# Patient Record
Sex: FEMALE | Race: WHITE | NOT HISPANIC OR LATINO | Employment: OTHER | URBAN - NONMETROPOLITAN AREA
[De-identification: names, ages, dates, MRNs, and addresses within clinical notes are randomized per-mention and may not be internally consistent; named-entity substitution may affect disease eponyms.]

---

## 2017-03-29 ENCOUNTER — APPOINTMENT (OUTPATIENT)
Dept: LAB | Facility: HOSPITAL | Age: 72
End: 2017-03-29

## 2017-03-29 ENCOUNTER — PROCEDURE VISIT (OUTPATIENT)
Dept: OBSTETRICS AND GYNECOLOGY | Facility: CLINIC | Age: 72
End: 2017-03-29

## 2017-03-29 VITALS
SYSTOLIC BLOOD PRESSURE: 132 MMHG | HEIGHT: 64 IN | BODY MASS INDEX: 35.34 KG/M2 | HEART RATE: 83 BPM | WEIGHT: 207 LBS | DIASTOLIC BLOOD PRESSURE: 62 MMHG

## 2017-03-29 DIAGNOSIS — M85.80 OSTEOPENIA: ICD-10-CM

## 2017-03-29 DIAGNOSIS — L60.0 INGROWN TOENAIL: ICD-10-CM

## 2017-03-29 DIAGNOSIS — Z01.419 ENCOUNTER FOR GYNECOLOGICAL EXAMINATION WITHOUT ABNORMAL FINDING: Primary | ICD-10-CM

## 2017-03-29 DIAGNOSIS — Z13.820 ENCOUNTER FOR SCREENING FOR OSTEOPOROSIS: ICD-10-CM

## 2017-03-29 DIAGNOSIS — Z78.0 POSTMENOPAUSAL: ICD-10-CM

## 2017-03-29 DIAGNOSIS — Z12.31 SCREENING MAMMOGRAM, ENCOUNTER FOR: ICD-10-CM

## 2017-03-29 DIAGNOSIS — R20.2 PARESTHESIA OF BOTH FEET: ICD-10-CM

## 2017-03-29 LAB
CRP SERPL-MCNC: 1.5 MG/DL (ref 0–1)
ERYTHROCYTE [SEDIMENTATION RATE] IN BLOOD: 32 MM/HR (ref 0–20)
HBA1C MFR BLD: 6.52 % (ref 4–5.6)

## 2017-03-29 PROCEDURE — 86140 C-REACTIVE PROTEIN: CPT | Performed by: NURSE PRACTITIONER

## 2017-03-29 PROCEDURE — 85651 RBC SED RATE NONAUTOMATED: CPT | Performed by: NURSE PRACTITIONER

## 2017-03-29 PROCEDURE — 83036 HEMOGLOBIN GLYCOSYLATED A1C: CPT | Performed by: NURSE PRACTITIONER

## 2017-03-29 PROCEDURE — 86038 ANTINUCLEAR ANTIBODIES: CPT | Performed by: NURSE PRACTITIONER

## 2017-03-29 PROCEDURE — 99214 OFFICE O/P EST MOD 30 MIN: CPT | Performed by: NURSE PRACTITIONER

## 2017-03-29 PROCEDURE — 36415 COLL VENOUS BLD VENIPUNCTURE: CPT | Performed by: NURSE PRACTITIONER

## 2017-03-29 NOTE — PROGRESS NOTES
Subjective   Chief Complaint   Patient presents with   • Gynecologic Exam     annual exam     Sharon Esposito is a 71 y.o. year old  presenting to be seen for her annual exam, mammogram and DEXA.      She is not sexually active.  In the past 12 months there has not been new sexual partners.  Condoms are not typically used.  She would not like to be screened for STD's at today's exam.     She exercises regularly: no.  She wears her seat belt:yes.  She has concerns about domestic violence: no.  She has noticed changes in height: no.    Last pap- 3/23/16; no history of abnormal  Last mammogram- 3/2016  Last DEXA-  (osteopenia)    She's concerned because she's been having numbness and tingling in her feet for the past few months. She reports that several family members have some type of dibilitating disease that breaks down their muscle function and she's concerned because she has never been checked for it. She is also having trouble with her left great toe. She had the nail removed several years ago b/c it grew backwards; she reports that it has started to grow back and is again growing the wrong way and causing pain.    The following portions of the patient's history were reviewed and updated as appropriate:problem list, current medications, allergies, past family history, past medical history, past social history and past surgical history.  Smoking status: Never Smoker                                                                 Smokeless status: Not on file                       Review of Systems   Constitutional: Negative for activity change, appetite change, fatigue and unexpected weight change.   Respiratory: Negative for apnea and shortness of breath.    Cardiovascular: Negative for chest pain, palpitations and leg swelling.   Gastrointestinal: Negative for abdominal distention and abdominal pain.   Endocrine: Negative.    Genitourinary: Negative for pelvic pain, vaginal bleeding, vaginal  "discharge and vaginal pain.   Musculoskeletal: Positive for joint swelling. Negative for gait problem and myalgias.   Skin: Negative for color change and rash.   Neurological: Positive for numbness. Negative for dizziness, tremors, syncope, weakness and headaches.         Objective   /62  Pulse 83  Ht 64\" (162.6 cm)  Wt 207 lb (93.9 kg)  LMP  (Approximate) Comment: 1994  Breastfeeding? No  BMI 35.53 kg/m2    General:  well developed; well nourished  no acute distress   Skin:  No suspicious lesions seen   Thyroid: normal to inspection and palpation   Breasts:  Examined in supine position  Symmetric without masses or skin dimpling  Nipples normal without inversion, lesions or discharge  There are no palpable axillary nodes  Fibrocystic changes are present both breasts without a discrete mass   Abdomen: soft, non-tender; no masses  no umbilical or inginual hernias are present  no hepato-splenomegaly  Normal findings: bowel sounds normal    Pelvis: Not performed. Pap smear not indicated.     Lab Review   Pap smear- 3/23/16    Imaging  DEXA  Mammogram report        Sharon was seen today for gynecologic exam.    Diagnoses and all orders for this visit:    Encounter for gynecological examination without abnormal finding    Screening mammogram, encounter for  -     Mammo Screening Digital Tomosynthesis Bilateral With CAD    Osteopenia    Paresthesia of both feet  -     Hemoglobin A1c  -     C-reactive Protein  -     JOSHUA  -     Sedimentation Rate  -     Ambulatory Referral to Podiatry    Ingrown toenail  -     Ambulatory Referral to Podiatry    Postmenopausal    Encounter for screening for osteoporosis  -DEXA Bone Density Axial        This note was electronically signed.    "

## 2017-03-30 LAB — ANA SER QL: NEGATIVE

## 2017-04-03 ENCOUNTER — TELEPHONE (OUTPATIENT)
Dept: OBSTETRICS AND GYNECOLOGY | Facility: CLINIC | Age: 72
End: 2017-04-03

## 2017-04-03 NOTE — TELEPHONE ENCOUNTER
----- Message from LEXI Ding sent at 4/3/2017  9:27 AM CDT -----  Please let Ms. Esposito know that her bone density in the spine is still normal and hips is still osteopenia. She needs to be taking vitamin d 2000iu daily and calcium 1200mg daily. If she's not please send a RX for her. She'll need her next DEXA in 2 years.

## 2017-04-05 ENCOUNTER — OFFICE VISIT (OUTPATIENT)
Dept: PODIATRY | Facility: CLINIC | Age: 72
End: 2017-04-05

## 2017-04-05 VITALS
DIASTOLIC BLOOD PRESSURE: 83 MMHG | WEIGHT: 205 LBS | OXYGEN SATURATION: 96 % | BODY MASS INDEX: 35 KG/M2 | HEIGHT: 64 IN | SYSTOLIC BLOOD PRESSURE: 165 MMHG | HEART RATE: 76 BPM

## 2017-04-05 DIAGNOSIS — Z76.89 REFERRAL OF PATIENT: Primary | ICD-10-CM

## 2017-04-05 DIAGNOSIS — M79.671 RIGHT FOOT PAIN: Primary | ICD-10-CM

## 2017-04-05 DIAGNOSIS — L60.0 INGROWN TOENAIL: ICD-10-CM

## 2017-04-05 PROCEDURE — 99203 OFFICE O/P NEW LOW 30 MIN: CPT | Performed by: PODIATRIST

## 2017-04-05 RX ORDER — DOCUSATE SODIUM 100 MG/1
100 CAPSULE, LIQUID FILLED ORAL 2 TIMES DAILY PRN
Qty: 60 CAPSULE | Refills: 5 | Status: SHIPPED | OUTPATIENT
Start: 2017-04-05 | End: 2018-02-21

## 2017-04-05 RX ORDER — CHOLECALCIFEROL (VITAMIN D3) 50 MCG
2000 TABLET ORAL DAILY
Qty: 30 TABLET | Refills: 5 | Status: SHIPPED | OUTPATIENT
Start: 2017-04-05 | End: 2018-04-05

## 2017-04-05 RX ORDER — IBUPROFEN 200 MG
600 CAPSULE ORAL 2 TIMES DAILY
Qty: 60 TABLET | Refills: 5 | Status: SHIPPED | OUTPATIENT
Start: 2017-04-05 | End: 2018-04-05

## 2017-04-05 NOTE — PROGRESS NOTES
Sharon Esposito  1945  71 y.o. female   PCP: Dr. Irby last seen in November.  Saw Gaviota Sheppard last week.  Patient presents today for bilateral hallux ingrown toenails.       4/5/17  Chief Complaint   Patient presents with   • Left Foot - Ingrown Toenail   • Right Foot - Ingrown Toenail           History of Present Illness    72 y/o female presents with chief complaint of painful great toenails. They have been bothering her for the last month. Has history of right hallux nail avulsion many years ago. It has since partially grown back and become bothersome.  States the the R nail is more painful than the left. Describes it as achy and constant in shoe gear. Has no other pedal complaints.          Past Medical History:   Diagnosis Date   • Acquired hypothyroidism     with hashimoto's thyroiditis      • Acute bronchitis    • Acute sinusitis    • Allergic rhinitis    • Bilateral hand pain    • Cough    • Diverticulitis of colon    • Edema     idiopathic state, history of      • Encounter for gynecological examination (general) (routine) without abnormal findings    • Encounter for screening for osteoporosis    • Essential hypertension    • Hemorrhoids     internal & external      • Hx of bone density study     DEXA BONE DENSITY APPENDICULAR: osteopenia, 12/18/2013   • Hx of screening mammography     MAMMOGRAM SCREENING: x2, 02/26/2014   • Hyperlipidemia     with elevated low density lipoprotein   • 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     history   • Infection of skin and subcutaneous tissue     Infection of skin AND/OR subcutaneous tissue      • Onychogryposis     recurrent ingrown toenail      • Open wound of face    • Osteopenia    • Screening for malignant neoplasm of breast    • Screening for osteoporosis    • Seasonal allergies     Seasonal allergy      • Vitamin deficiency          Past Surgical History:   Procedure Laterality Date   • ENDOSCOPY AND COLONOSCOPY   04/20/2009    A few small diverticula in the sigmoid & the descending colon. Small internal & external hemorrhoids were present. Colonoscopy otherwise normal   • INJECTION OF MEDICATION  06/23/2015    Celestone (betamethasone) x2   • INJECTION OF MEDICATION  12/08/2014    Toradol    • PAP SMEAR  06/23/2009    Negative for intraepithelial lesion or malignancy   • SKIN BIOPSY  09/30/2010    L neck lesion- seborrheic keratosis. right infraorbital-seborrheic keratosis, right lateral orbital- intradermal nevus. forehead lesion-early squamous papilloma         Family History   Problem Relation Age of Onset   • Breast cancer Mother    • Coronary artery disease Father    • Breast cancer Other    • Coronary artery disease Other    • Heart disease Other          Social History     Social History   • Marital status: Single     Spouse name: N/A   • Number of children: N/A   • Years of education: N/A     Occupational History   • Not on file.     Social History Main Topics   • Smoking status: Never Smoker   • Smokeless tobacco: Not on file   • Alcohol use No   • Drug use: Not on file   • Sexual activity: Not on file     Other Topics Concern   • Not on file     Social History Narrative         Current Outpatient Prescriptions   Medication Sig Dispense Refill   • atorvastatin (LIPITOR) 20 MG tablet Take 20 mg by mouth Every Night.     • fluticasone (FLONASE) 50 MCG/ACT nasal spray INSTILL 2 SPRAY(S) EACH NOSTRIL DAILY  3   • levothyroxine (SYNTHROID, LEVOTHROID) 88 MCG tablet Take 88 mcg by mouth Daily.     • Loratadine 10 MG capsule Take  by mouth.     • losartan-hydrochlorothiazide (HYZAAR) 100-25 MG per tablet Take 1 tablet by mouth Daily.     • metoprolol tartrate (LOPRESSOR) 25 MG tablet Take 25 mg by mouth 2 (Two) Times a Day. one-half By Mouth 2 times a day     • calcium (OS-KRISHNA) 600 MG tablet Take 1 tablet by mouth 2 (Two) Times a Day. 60 tablet 5   • Cholecalciferol (VITAMIN D) 2000 UNITS tablet Take 2,000 Units by mouth  "Daily. 30 tablet 5   • docusate sodium (COLACE) 100 MG capsule Take 1 capsule by mouth 2 (Two) Times a Day As Needed for Constipation. 60 capsule 5     No current facility-administered medications for this visit.          OBJECTIVE    /83  Pulse 76  Ht 64\" (162.6 cm)  Wt 205 lb (93 kg)  LMP  (LMP Unknown)  SpO2 96%  BMI 35.19 kg/m2      Review of Systems   Constitutional: Negative for chills and fever.   Cardiovascular: Negative for chest pain.   Gastrointestinal: Negative for constipation, diarrhea, nausea and vomiting.   Skin: Negative for wound. ingrowing nails  Musculoskeletal: bilateral foot pain      Constitutional: well developed, well nourished    HEENT: Normocephalic and atraumatic, normal hearing    Respiratory: Non labored respirations noted    Cardiovascular:    DP/PT pulses palpable    CFT brisk  to all digits  Skin temp is warm to warm from proximal tibia to distal digits  Pedal hair growth present.   No erythema or edema noted     Musculoskeletal:  Muscle strength is 5/5 for all muscle groups tested   ROM of the 1st MTP is full without pain or crepitus  ROM of the MTJ is full without pain or crepitus    ROM of the STJ is full without pain or crepitus    ROM of the ankle joint is full without pain or crepitus    No pain on palpation noted    Rectus foot type     Dermatological:   Nails 2-5 are within normal limits for length and thickness. Left hallux nail is thickened and discolored. Right hallux nails is thickened, discolored and incurvated at the proximal lateral border. No signs of infection. It is tender to palpation.    Skin is warm, dry and intact    Webspaces 1-4 bilateral are clean, dry and intact.   No subcutaneous nodules or masses noted    No open wounds noted     Neurological:   Protective sensation intact    Sensation intact to light touch    DTR intact    Psychiatric: A&O x 3 with normal mood and affect. NAD.       Nail Removal  Date/Time: 4/6/2017 5:29 PM  Performed by: " HAI ALVA  Authorized by: HAI ALVA   Consent: Verbal consent obtained. Written consent obtained.  Risks and benefits: risks, benefits and alternatives were discussed  Consent given by: patient  Patient identity confirmed: verbally with patient  Nail removal extremity: right hallux nail.  Anesthesia: digital block    Anesthesia:  Anesthesia: digital block  Local Anesthetic: lidocaine 2% without epinephrine   Sedation:  Patient sedated: no    Preparation: skin prepped with Betadine  Amount removed: complete (nail  from nail bed with blunt dissection)  Nail matrix removed: complete (nail removed with hemostat)  Dressing: antibiotic ointment and dressing applied  Patient tolerance: Patient tolerated the procedure well with no immediate complications              ASSESSMENT AND PLAN    Sharon was seen today for ingrown toenail and ingrown toenail.    Diagnoses and all orders for this visit:    Right foot pain    Ingrown toenail    - Comprehensive foot and ankle exam performed  - Diagnosis, prevention and treatment of ingrown toenails discussed with patient, including risks and potential benefits of nail avulsion both temporary and permanent versus simple debridement.  - Patient elected for a total permanent nail avulsion to right hallux nail  - Dispensed aftercare instruction sheet  - All questions were answered and the patient is in agreement with the current treatment plan.  - RTC in 2 weeks            This document has been electronically signed by Hai Alva DPM on April 6, 2017 5:23 PM     4/6/2017  5:23 PM

## 2017-04-06 PROCEDURE — 11750 EXCISION NAIL&NAIL MATRIX: CPT | Performed by: PODIATRIST

## 2017-04-10 ENCOUNTER — TELEPHONE (OUTPATIENT)
Dept: FAMILY MEDICINE CLINIC | Facility: CLINIC | Age: 72
End: 2017-04-10

## 2017-04-10 NOTE — TELEPHONE ENCOUNTER
SC DR. MONTE'S INSTRUCTION  Ms. Esposito has been called and advised that she needs to come in and discuss her recent lab results  An appt has been booked for Tuesday April 18 @ 9:45 AM.    Patient states understanding of appt date, time and location    ----- Message from Jeffry Monte MD sent at 4/10/2017  4:04 PM CDT -----  She needs to make an appointment to discuss these results with me.

## 2017-04-18 ENCOUNTER — OFFICE VISIT (OUTPATIENT)
Dept: FAMILY MEDICINE CLINIC | Facility: CLINIC | Age: 72
End: 2017-04-18

## 2017-04-18 ENCOUNTER — LAB (OUTPATIENT)
Dept: LAB | Facility: HOSPITAL | Age: 72
End: 2017-04-18

## 2017-04-18 VITALS
BODY MASS INDEX: 35.36 KG/M2 | SYSTOLIC BLOOD PRESSURE: 130 MMHG | OXYGEN SATURATION: 99 % | HEART RATE: 65 BPM | WEIGHT: 206 LBS | DIASTOLIC BLOOD PRESSURE: 82 MMHG

## 2017-04-18 DIAGNOSIS — E11.41 TYPE 2 DIABETES MELLITUS WITH DIABETIC MONONEUROPATHY, WITHOUT LONG-TERM CURRENT USE OF INSULIN (HCC): ICD-10-CM

## 2017-04-18 DIAGNOSIS — E03.9 ACQUIRED HYPOTHYROIDISM: ICD-10-CM

## 2017-04-18 DIAGNOSIS — E11.42 DIABETIC PERIPHERAL NEUROPATHY (HCC): Primary | ICD-10-CM

## 2017-04-18 DIAGNOSIS — E11.42 DIABETIC PERIPHERAL NEUROPATHY (HCC): ICD-10-CM

## 2017-04-18 LAB
ALBUMIN SERPL-MCNC: 4.6 G/DL (ref 3.4–4.8)
ALBUMIN UR-MCNC: <0.6 MG/L
ALBUMIN/GLOB SERPL: 1.5 G/DL (ref 1.1–1.8)
ALP SERPL-CCNC: 108 U/L (ref 38–126)
ALT SERPL W P-5'-P-CCNC: 22 U/L (ref 9–52)
ANION GAP SERPL CALCULATED.3IONS-SCNC: 12 MMOL/L (ref 5–15)
ARTICHOKE IGE QN: 77 MG/DL (ref 1–129)
AST SERPL-CCNC: 36 U/L (ref 14–36)
BILIRUB SERPL-MCNC: 0.7 MG/DL (ref 0.2–1.3)
BUN BLD-MCNC: 13 MG/DL (ref 7–21)
BUN/CREAT SERPL: 18.3 (ref 7–25)
CALCIUM SPEC-SCNC: 9.3 MG/DL (ref 8.4–10.2)
CHLORIDE SERPL-SCNC: 104 MMOL/L (ref 95–110)
CHOLEST SERPL-MCNC: 191 MG/DL (ref 0–199)
CO2 SERPL-SCNC: 23 MMOL/L (ref 22–31)
CREAT BLD-MCNC: 0.71 MG/DL (ref 0.5–1)
CREAT UR-MCNC: 37.2 MG/DL
GFR SERPL CREATININE-BSD FRML MDRD: 82 ML/MIN/1.73 (ref 45–104)
GLOBULIN UR ELPH-MCNC: 3.1 GM/DL (ref 2.3–3.5)
GLUCOSE BLD-MCNC: 110 MG/DL (ref 60–100)
HDLC SERPL-MCNC: 68 MG/DL (ref 60–200)
LDLC/HDLC SERPL: 1.2 {RATIO} (ref 0–3.22)
MICROALBUMIN/CREAT UR: NORMAL MG/G (ref 0–30)
POTASSIUM BLD-SCNC: 4.3 MMOL/L (ref 3.5–5.1)
PROT SERPL-MCNC: 7.7 G/DL (ref 6.3–8.6)
SODIUM BLD-SCNC: 139 MMOL/L (ref 137–145)
T4 FREE SERPL-MCNC: 1.33 NG/DL (ref 0.78–2.19)
TRIGL SERPL-MCNC: 207 MG/DL (ref 20–199)
TSH SERPL DL<=0.05 MIU/L-ACNC: 3.03 MIU/ML (ref 0.46–4.68)
VIT B12 BLD-MCNC: 199 PG/ML (ref 239–931)

## 2017-04-18 PROCEDURE — 84439 ASSAY OF FREE THYROXINE: CPT

## 2017-04-18 PROCEDURE — 36415 COLL VENOUS BLD VENIPUNCTURE: CPT | Performed by: FAMILY MEDICINE

## 2017-04-18 PROCEDURE — 82043 UR ALBUMIN QUANTITATIVE: CPT | Performed by: FAMILY MEDICINE

## 2017-04-18 PROCEDURE — 84443 ASSAY THYROID STIM HORMONE: CPT

## 2017-04-18 PROCEDURE — 80053 COMPREHEN METABOLIC PANEL: CPT | Performed by: FAMILY MEDICINE

## 2017-04-18 PROCEDURE — 82570 ASSAY OF URINE CREATININE: CPT | Performed by: FAMILY MEDICINE

## 2017-04-18 PROCEDURE — 80061 LIPID PANEL: CPT | Performed by: FAMILY MEDICINE

## 2017-04-18 PROCEDURE — 82607 VITAMIN B-12: CPT

## 2017-04-18 PROCEDURE — 99214 OFFICE O/P EST MOD 30 MIN: CPT | Performed by: FAMILY MEDICINE

## 2017-04-18 RX ORDER — METFORMIN HYDROCHLORIDE 500 MG/1
1000 TABLET, EXTENDED RELEASE ORAL
Qty: 60 TABLET | Refills: 1 | Status: SHIPPED | OUTPATIENT
Start: 2017-04-18 | End: 2017-05-22 | Stop reason: SDUPTHER

## 2017-04-18 NOTE — PROGRESS NOTES
Review her labs in consideration of her new onset diabetes, I recommend increasing atorvastatin from 20 mg a day to 40 mg a day.  I'll she develops any new side effects from this

## 2017-04-18 NOTE — PROGRESS NOTES
Subjective   Sharon Esposito is a 71 y.o. female.     Diabetes   She presents for her initial diabetic visit. She has type 2 diabetes mellitus. Associated symptoms include foot paresthesias. Pertinent negatives for diabetes include no foot ulcerations, no polydipsia and no polyuria. An ACE inhibitor/angiotensin II receptor blocker is being taken. Eye exam is not current.        The following portions of the patient's history were reviewed and updated as appropriate: allergies, current medications, past family history, past medical history, past social history, past surgical history and problem list.    Review of Systems   Endocrine: Negative for polydipsia and polyuria.       Objective   Physical Exam   Constitutional: She is oriented to person, place, and time. She appears well-developed and well-nourished. No distress.   HENT:   Head: Normocephalic and atraumatic.   Cardiovascular: Normal rate, regular rhythm, normal heart sounds and intact distal pulses.  Exam reveals no gallop and no friction rub.    No murmur heard.  Pulmonary/Chest: Effort normal and breath sounds normal. No respiratory distress. She has no wheezes. She has no rales. She exhibits no tenderness.    Sharon had a diabetic foot exam performed (callus noted) today.   During the foot exam she had a monofilament test performed (normal).    Vascular Status -  Her exam exhibits right foot vasculature normal. Her exam exhibits no right foot edema. Her exam exhibits left foot vasculature normal. Her exam exhibits no left foot edema.  Neurological: She is alert and oriented to person, place, and time.   Skin: Skin is warm and dry. She is not diaphoretic.   Psychiatric: She has a normal mood and affect. Her behavior is normal. Judgment and thought content normal.   Nursing note and vitals reviewed.      Assessment/Plan   Problems Addressed this Visit     None      Visit Diagnoses     Diabetic peripheral neuropathy    -  Primary    Relevant  Medications    metFORMIN ER (GLUCOPHAGE-XR) 500 MG 24 hr tablet    Other Relevant Orders    Vitamin B12    Type 2 diabetes mellitus with diabetic mononeuropathy, without long-term current use of insulin        Relevant Medications    metFORMIN ER (GLUCOPHAGE-XR) 500 MG 24 hr tablet    Other Relevant Orders    Microalbumin / Creatinine Urine Ratio    Comprehensive Metabolic Panel    Lipid Panel          Mrs. Haddad was seen by nurse practitioner this for her GYN exams and noted that she was having some peripheral neuropathy symptoms.  Hemoglobin A1c showed a 6.53 at this point she is diabetic.  She had an A1c of 5.9 in the last 3 years.  We discussed the complications of diabetes today.  These included ophthalmologic medication and need to have yearly eye exams.  She's had a dilated eye exam within the last year and has one rescheduled for November of this year.  We also talked talked about the vascular medications including increased risk of MI and strokes.  Locations including renal failure and the need for dialysis were discussed.  Finally we discuss the peripheral and neurologic consultations diabetes.  We get a diabetic foot exam today.  His note that the B12 and pain that today.  For completeness.  Also will obtain for microalbuminuria CMP and lipid panel.  I will discuss with her these results when available.  Overall 25 minutes was that with patient today with over 50% in counseling about new-onset diabetes

## 2017-04-19 ENCOUNTER — TELEPHONE (OUTPATIENT)
Dept: FAMILY MEDICINE CLINIC | Facility: CLINIC | Age: 72
End: 2017-04-19

## 2017-04-19 ENCOUNTER — CLINICAL SUPPORT (OUTPATIENT)
Dept: FAMILY MEDICINE CLINIC | Facility: CLINIC | Age: 72
End: 2017-04-19

## 2017-04-19 ENCOUNTER — OFFICE VISIT (OUTPATIENT)
Dept: PODIATRY | Facility: CLINIC | Age: 72
End: 2017-04-19

## 2017-04-19 VITALS — HEIGHT: 64 IN | BODY MASS INDEX: 35.17 KG/M2 | WEIGHT: 206 LBS

## 2017-04-19 DIAGNOSIS — L60.0 INGROWN TOENAIL: Primary | ICD-10-CM

## 2017-04-19 DIAGNOSIS — E53.8 VITAMIN B12 DEFICIENCY: Primary | ICD-10-CM

## 2017-04-19 PROCEDURE — 96372 THER/PROPH/DIAG INJ SC/IM: CPT | Performed by: FAMILY MEDICINE

## 2017-04-19 PROCEDURE — 99212 OFFICE O/P EST SF 10 MIN: CPT | Performed by: PODIATRIST

## 2017-04-19 RX ORDER — ATORVASTATIN CALCIUM 40 MG/1
40 TABLET, FILM COATED ORAL DAILY
Qty: 90 TABLET | Refills: 3 | Status: SHIPPED | OUTPATIENT
Start: 2017-04-19 | End: 2017-05-22 | Stop reason: SDUPTHER

## 2017-04-19 RX ORDER — CYANOCOBALAMIN 1000 UG/ML
1000 INJECTION, SOLUTION INTRAMUSCULAR; SUBCUTANEOUS ONCE
Status: COMPLETED | OUTPATIENT
Start: 2017-04-19 | End: 2017-04-19

## 2017-04-19 RX ADMIN — CYANOCOBALAMIN 1000 MCG: 1000 INJECTION, SOLUTION INTRAMUSCULAR; SUBCUTANEOUS at 15:11

## 2017-04-19 NOTE — PROGRESS NOTES
Vitamin B12 is low.  Recommend 1000 µg of B12 subcutaneous today, Friday, next Monday, and then monthly,

## 2017-04-19 NOTE — TELEPHONE ENCOUNTER
----- Message from Jeffry Irby MD sent at 4/19/2017  9:07 AM CDT -----  Vitamin B12 is low.  Recommend 1000 µg of B12 subcutaneous today, Friday, next Monday, and then monthly,

## 2017-04-19 NOTE — TELEPHONE ENCOUNTER
Pt came into office today.  Spoke with patient and relayed all results and recommendations per Dr. aquino including all lab results, B12 injections and increase in Lipitor.  Pt stated understanding.  Gave patient a reminder of days to come in for B12 injections and also the dosage to increase her Lipitor.  Pt asked me to not refill her Lipitor right now, that she would use what she had at home first and then let me know when she needed more due to the fact that she uses a mail order and that they would immediately send them to her and she had just gotten a refill in the mail.  I told her that I would wait to hear from her before refilling the Lipitor.

## 2017-04-19 NOTE — TELEPHONE ENCOUNTER
Results Relayed by BREA Mercer in Dr. Irby's office.   New Script sent to her mail order pharm    ---- Message from Jeffry Irby MD sent at 4/18/2017  3:13 PM CDT -----  Review her labs in consideration of her new onset diabetes, I recommend increasing atorvastatin from 20 mg a day to 40 mg a day.  I'll she develops any new side effects from this

## 2017-04-19 NOTE — PROGRESS NOTES
Sharon Esposito  1945  71 y.o. female   Presents today for a recheck of her right great toenail avulsion       04/19/17    Chief Complaint   Patient presents with   • Right Foot - Follow-up           History of Present Illness    Patient presents for recheck of her right great toenail avulsion.  She is doing very well at this time.  She has no pain.  She has been soaking and dressing the toe as instructed.  She has no new pedal complaints.      Past Medical History:   Diagnosis Date   • Acquired hypothyroidism     with hashimoto's thyroiditis      • Acute bronchitis    • Acute sinusitis    • Allergic rhinitis    • Bilateral hand pain    • Cough    • Diverticulitis of colon    • Edema     idiopathic state, history of      • Encounter for gynecological examination (general) (routine) without abnormal findings    • Encounter for screening for osteoporosis    • Essential hypertension    • Hemorrhoids     internal & external      • Hx of bone density study     DEXA BONE DENSITY APPENDICULAR: osteopenia, 12/18/2013   • Hx of screening mammography     MAMMOGRAM SCREENING: x2, 02/26/2014   • Hyperlipidemia     with elevated low density lipoprotein   • 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     history   • Infection of skin and subcutaneous tissue     Infection of skin AND/OR subcutaneous tissue      • Onychogryposis     recurrent ingrown toenail      • Open wound of face    • Osteopenia    • Screening for malignant neoplasm of breast    • Screening for osteoporosis    • Seasonal allergies     Seasonal allergy      • Vitamin deficiency          Past Surgical History:   Procedure Laterality Date   • ENDOSCOPY AND COLONOSCOPY  04/20/2009    A few small diverticula in the sigmoid & the descending colon. Small internal & external hemorrhoids were present. Colonoscopy otherwise normal   • INJECTION OF MEDICATION  06/23/2015    Celestone (betamethasone) x2   • INJECTION OF MEDICATION   12/08/2014    Toradol    • PAP SMEAR  06/23/2009    Negative for intraepithelial lesion or malignancy   • SKIN BIOPSY  09/30/2010    L neck lesion- seborrheic keratosis. right infraorbital-seborrheic keratosis, right lateral orbital- intradermal nevus. forehead lesion-early squamous papilloma         Family History   Problem Relation Age of Onset   • Breast cancer Mother    • Coronary artery disease Father    • Breast cancer Other    • Coronary artery disease Other    • Heart disease Other          Social History     Social History   • Marital status: Single     Spouse name: N/A   • Number of children: N/A   • Years of education: N/A     Occupational History   • Not on file.     Social History Main Topics   • Smoking status: Never Smoker   • Smokeless tobacco: Not on file   • Alcohol use No   • Drug use: Not on file   • Sexual activity: Not on file     Other Topics Concern   • Not on file     Social History Narrative         Current Outpatient Prescriptions   Medication Sig Dispense Refill   • atorvastatin (LIPITOR) 20 MG tablet Take 20 mg by mouth Every Night.     • calcium (OS-KRISHNA) 600 MG tablet Take 1 tablet by mouth 2 (Two) Times a Day. 60 tablet 5   • Cholecalciferol (VITAMIN D) 2000 UNITS tablet Take 2,000 Units by mouth Daily. 30 tablet 5   • docusate sodium (COLACE) 100 MG capsule Take 1 capsule by mouth 2 (Two) Times a Day As Needed for Constipation. 60 capsule 5   • fluticasone (FLONASE) 50 MCG/ACT nasal spray INSTILL 2 SPRAY(S) EACH NOSTRIL DAILY  3   • levothyroxine (SYNTHROID, LEVOTHROID) 88 MCG tablet Take 88 mcg by mouth Daily.     • Loratadine 10 MG capsule Take  by mouth.     • losartan-hydrochlorothiazide (HYZAAR) 100-25 MG per tablet Take 1 tablet by mouth Daily.     • metFORMIN ER (GLUCOPHAGE-XR) 500 MG 24 hr tablet Take 2 tablets by mouth Daily Before Supper. 60 tablet 1   • metoprolol tartrate (LOPRESSOR) 25 MG tablet Take 25 mg by mouth 2 (Two) Times a Day. one-half By Mouth 2 times a day    "    No current facility-administered medications for this visit.          OBJECTIVE    Ht 64\" (162.6 cm)  Wt 206 lb (93.4 kg)  LMP  (LMP Unknown)  BMI 35.36 kg/m2      Review of Systems   Constitutional: Negative for chills and fever.   Cardiovascular: Negative for chest pain.   Gastrointestinal: Negative for constipation, diarrhea, nausea and vomiting.   Skin: Negative for wound.   Musculoskeletal: neg foot pain      Constitutional: well developed, well nourished    HEENT: Normocephalic and atraumatic, normal hearing    Respiratory: Non labored respirations noted    Cardiovascular:    DP/PT pulses palpable    CFT brisk  to all digits  Skin temp is warm to warm from proximal tibia to distal digits  Pedal hair growth present.   No erythema or edema noted     Musculoskeletal:  Muscle strength is 5/5 for all muscle groups tested   ROM of the 1st MTP is full without pain or crepitus  ROM of the MTJ is full without pain or crepitus    ROM of the STJ is full without pain or crepitus    ROM of the ankle joint is full without pain or crepitus    No pain on palpation noted    Rectus foot type     Dermatological:   Nails 2-5 are within normal limits for length and thickness. Left hallux nail is thickened and discolored. Right hallux nail is absent. No signs of infection.   Skin is warm, dry and intact    Webspaces 1-4 bilateral are clean, dry and intact.   No subcutaneous nodules or masses noted    No open wounds noted     Neurological:   Protective sensation intact    Sensation intact to light touch    DTR intact    Psychiatric: A&O x 3 with normal mood and affect. NAD.       Procedures        ASSESSMENT AND PLAN    Sharon was seen today for follow-up.    Diagnoses and all orders for this visit:    Ingrown toenail    - pt is doing well  - continue soaking until no drainage on the band-aid then ok to dc soaks and dressing  - All questions were answered and the patient is in agreement with the current treatment plan.  - " RTC prn          This document has been electronically signed by Quentin Tomlin DPM on April 19, 2017 11:02 AM     4/19/2017  11:02 AM

## 2017-04-21 ENCOUNTER — CLINICAL SUPPORT (OUTPATIENT)
Dept: FAMILY MEDICINE CLINIC | Facility: CLINIC | Age: 72
End: 2017-04-21

## 2017-04-21 ENCOUNTER — TELEPHONE (OUTPATIENT)
Dept: FAMILY MEDICINE CLINIC | Facility: CLINIC | Age: 72
End: 2017-04-21

## 2017-04-21 DIAGNOSIS — E11.9 DIABETES MELLITUS WITHOUT COMPLICATION (HCC): Primary | ICD-10-CM

## 2017-04-21 DIAGNOSIS — E53.8 VITAMIN B12 DEFICIENCY: Primary | ICD-10-CM

## 2017-04-21 PROCEDURE — 96372 THER/PROPH/DIAG INJ SC/IM: CPT | Performed by: FAMILY MEDICINE

## 2017-04-21 RX ORDER — CYANOCOBALAMIN 1000 UG/ML
1000 INJECTION, SOLUTION INTRAMUSCULAR; SUBCUTANEOUS ONCE
Status: COMPLETED | OUTPATIENT
Start: 2017-04-21 | End: 2017-04-21

## 2017-04-21 RX ADMIN — CYANOCOBALAMIN 1000 MCG: 1000 INJECTION, SOLUTION INTRAMUSCULAR; SUBCUTANEOUS at 14:45

## 2017-04-21 NOTE — TELEPHONE ENCOUNTER
Script sent for One Touch Ultra, Test Strips and Lancets  Pr Pharmacy's request.       ----- Message from Graciela Mendez sent at 4/21/2017 12:40 PM CDT -----  Regarding: LAVELL ROSADO  Contact: [phone]  [facility] CALLED FOR [facility]...THEY HAVE PRESP FOR GLUCOSE METER...TEST STRIPS AND LANCETS BUT THEY NEED TO KNOW HOW MANY TIMES SHE WILL BE TESTING AND WHAT KIND OF METER DOES DR WANT...PLEASE CALL THEM OR SEND ANOTHER PRESP

## 2017-04-24 ENCOUNTER — CLINICAL SUPPORT (OUTPATIENT)
Dept: FAMILY MEDICINE CLINIC | Facility: CLINIC | Age: 72
End: 2017-04-24

## 2017-04-24 DIAGNOSIS — E53.8 VITAMIN B12 DEFICIENCY: Primary | ICD-10-CM

## 2017-04-24 PROCEDURE — 96372 THER/PROPH/DIAG INJ SC/IM: CPT | Performed by: FAMILY MEDICINE

## 2017-04-24 RX ORDER — CYANOCOBALAMIN 1000 UG/ML
1000 INJECTION, SOLUTION INTRAMUSCULAR; SUBCUTANEOUS ONCE
Status: COMPLETED | OUTPATIENT
Start: 2017-04-24 | End: 2017-04-24

## 2017-04-24 RX ADMIN — CYANOCOBALAMIN 1000 MCG: 1000 INJECTION, SOLUTION INTRAMUSCULAR; SUBCUTANEOUS at 15:12

## 2017-05-22 ENCOUNTER — OFFICE VISIT (OUTPATIENT)
Dept: FAMILY MEDICINE CLINIC | Facility: CLINIC | Age: 72
End: 2017-05-22

## 2017-05-22 VITALS
SYSTOLIC BLOOD PRESSURE: 122 MMHG | DIASTOLIC BLOOD PRESSURE: 76 MMHG | OXYGEN SATURATION: 99 % | BODY MASS INDEX: 34.64 KG/M2 | WEIGHT: 201.8 LBS | HEART RATE: 67 BPM

## 2017-05-22 DIAGNOSIS — I10 ESSENTIAL HYPERTENSION: Primary | ICD-10-CM

## 2017-05-22 DIAGNOSIS — E11.9 DIABETES MELLITUS WITHOUT COMPLICATION (HCC): ICD-10-CM

## 2017-05-22 DIAGNOSIS — E11.9 TYPE 2 DIABETES MELLITUS WITHOUT COMPLICATION, WITHOUT LONG-TERM CURRENT USE OF INSULIN (HCC): ICD-10-CM

## 2017-05-22 PROCEDURE — 99214 OFFICE O/P EST MOD 30 MIN: CPT | Performed by: FAMILY MEDICINE

## 2017-05-22 RX ORDER — ATORVASTATIN CALCIUM 40 MG/1
40 TABLET, FILM COATED ORAL DAILY
Qty: 90 TABLET | Refills: 3 | Status: SHIPPED | OUTPATIENT
Start: 2017-05-22 | End: 2018-02-21 | Stop reason: SDUPTHER

## 2017-05-22 RX ORDER — LOSARTAN POTASSIUM AND HYDROCHLOROTHIAZIDE 25; 100 MG/1; MG/1
1 TABLET ORAL DAILY
Qty: 90 TABLET | Refills: 2 | Status: SHIPPED | OUTPATIENT
Start: 2017-05-22 | End: 2018-02-21 | Stop reason: SDUPTHER

## 2017-05-22 RX ORDER — LEVOTHYROXINE SODIUM 88 UG/1
88 TABLET ORAL DAILY
Qty: 90 TABLET | Refills: 2 | Status: SHIPPED | OUTPATIENT
Start: 2017-05-22 | End: 2018-02-21 | Stop reason: SDUPTHER

## 2017-05-22 RX ORDER — METFORMIN HYDROCHLORIDE 500 MG/1
1000 TABLET, EXTENDED RELEASE ORAL
Qty: 60 TABLET | Refills: 1 | Status: SHIPPED | OUTPATIENT
Start: 2017-05-22 | End: 2017-08-23 | Stop reason: SDUPTHER

## 2017-05-24 ENCOUNTER — TELEPHONE (OUTPATIENT)
Dept: FAMILY MEDICINE CLINIC | Facility: CLINIC | Age: 72
End: 2017-05-24

## 2017-08-23 ENCOUNTER — OFFICE VISIT (OUTPATIENT)
Dept: FAMILY MEDICINE CLINIC | Facility: CLINIC | Age: 72
End: 2017-08-23

## 2017-08-23 VITALS
SYSTOLIC BLOOD PRESSURE: 130 MMHG | DIASTOLIC BLOOD PRESSURE: 70 MMHG | HEIGHT: 64 IN | WEIGHT: 200.2 LBS | OXYGEN SATURATION: 99 % | BODY MASS INDEX: 34.18 KG/M2 | HEART RATE: 76 BPM

## 2017-08-23 DIAGNOSIS — E78.00 PURE HYPERCHOLESTEROLEMIA: ICD-10-CM

## 2017-08-23 DIAGNOSIS — I10 ESSENTIAL HYPERTENSION: Primary | ICD-10-CM

## 2017-08-23 DIAGNOSIS — E11.9 TYPE 2 DIABETES MELLITUS WITHOUT COMPLICATION, WITHOUT LONG-TERM CURRENT USE OF INSULIN (HCC): ICD-10-CM

## 2017-08-23 PROCEDURE — 99214 OFFICE O/P EST MOD 30 MIN: CPT | Performed by: FAMILY MEDICINE

## 2017-08-23 RX ORDER — METFORMIN HYDROCHLORIDE 500 MG/1
1000 TABLET, EXTENDED RELEASE ORAL
Qty: 180 TABLET | Refills: 2 | Status: SHIPPED | OUTPATIENT
Start: 2017-08-23 | End: 2018-02-21 | Stop reason: SDUPTHER

## 2017-08-23 NOTE — PROGRESS NOTES
Subjective   Sharon Esposito is a 71 y.o. female.     Hypertension   This is a chronic problem. The current episode started more than 1 year ago. The problem is unchanged. The problem is controlled (pt checks occisionally at [facility] pharmacy, runs [id number]). Pertinent negatives include no chest pain, orthopnea, palpitations, peripheral edema, PND or shortness of breath.   Diabetes   She presents for her initial diabetic visit. She has type 2 diabetes mellitus. Associated symptoms include polydipsia. Pertinent negatives for diabetes include no chest pain, no foot paresthesias, no foot ulcerations and no polyuria. Her breakfast blood glucose is taken between 8-9 am. Her breakfast blood glucose range is generally 110-130 mg/dl. An ACE inhibitor/angiotensin II receptor blocker is being taken. Eye exam is not current.   Hypothyroidism   This is a chronic problem. The current episode started more than 1 year ago. The problem has been unchanged. Pertinent negatives include no chest pain or myalgias.   Hyperlipidemia   This is a chronic problem. The problem is controlled. Recent lipid tests were reviewed and are normal. Exacerbating diseases include hypothyroidism and obesity. Pertinent negatives include no chest pain, myalgias or shortness of breath.        The following portions of the patient's history were reviewed and updated as appropriate: allergies, current medications, past family history, past medical history, past social history, past surgical history and problem list.    Review of Systems   Respiratory: Negative for shortness of breath.    Cardiovascular: Negative for chest pain, palpitations, orthopnea and PND.   Endocrine: Positive for polydipsia. Negative for polyuria.   Musculoskeletal: Negative for myalgias.       Objective   Physical Exam   Constitutional: She is oriented to person, place, and time. She appears well-developed and well-nourished. No distress.   HENT:   Head: Normocephalic and  atraumatic.   Cardiovascular: Normal rate, regular rhythm, normal heart sounds and intact distal pulses.  Exam reveals no gallop and no friction rub.    No murmur heard.  Pulmonary/Chest: Effort normal and breath sounds normal. No respiratory distress. She has no wheezes. She has no rales. She exhibits no tenderness.   Musculoskeletal: She exhibits no edema.    Sharon had a diabetic foot exam performed (callus noted) today.   During the foot exam she had a monofilament test performed (decreased).    Vascular Status -  Her exam exhibits right foot vasculature normal. Her exam exhibits no right foot edema. Her exam exhibits left foot vasculature normal. Her exam exhibits no left foot edema.  Neurological: She is alert and oriented to person, place, and time.   Skin: Skin is warm and dry. She is not diaphoretic.   Psychiatric: She has a normal mood and affect. Her behavior is normal. Judgment and thought content normal.   Nursing note and vitals reviewed.      Assessment/Plan   Problems Addressed this Visit        Cardiovascular and Mediastinum    Hyperlipidemia    Essential hypertension - Primary      Other Visit Diagnoses     Type 2 diabetes mellitus without complication, without long-term current use of insulin        Relevant Medications    metFORMIN ER (GLUCOPHAGE-XR) 500 MG 24 hr tablet

## 2017-11-21 ENCOUNTER — APPOINTMENT (OUTPATIENT)
Dept: LAB | Facility: HOSPITAL | Age: 72
End: 2017-11-21

## 2017-11-21 ENCOUNTER — OFFICE VISIT (OUTPATIENT)
Dept: FAMILY MEDICINE CLINIC | Facility: CLINIC | Age: 72
End: 2017-11-21

## 2017-11-21 VITALS
HEIGHT: 64 IN | BODY MASS INDEX: 33.82 KG/M2 | DIASTOLIC BLOOD PRESSURE: 82 MMHG | SYSTOLIC BLOOD PRESSURE: 130 MMHG | HEART RATE: 71 BPM | OXYGEN SATURATION: 99 % | WEIGHT: 198.1 LBS

## 2017-11-21 DIAGNOSIS — E03.9 ACQUIRED HYPOTHYROIDISM: ICD-10-CM

## 2017-11-21 DIAGNOSIS — E78.00 PURE HYPERCHOLESTEROLEMIA: Primary | ICD-10-CM

## 2017-11-21 DIAGNOSIS — E11.9 TYPE 2 DIABETES MELLITUS WITHOUT COMPLICATION, WITHOUT LONG-TERM CURRENT USE OF INSULIN (HCC): ICD-10-CM

## 2017-11-21 DIAGNOSIS — I10 ESSENTIAL HYPERTENSION: ICD-10-CM

## 2017-11-21 LAB
ALBUMIN SERPL-MCNC: 4.2 G/DL (ref 3.4–4.8)
ALBUMIN UR-MCNC: <0.6 MG/L
ALBUMIN/GLOB SERPL: 1.2 G/DL (ref 1.1–1.8)
ALP SERPL-CCNC: 90 U/L (ref 38–126)
ALT SERPL W P-5'-P-CCNC: 30 U/L (ref 9–52)
ANION GAP SERPL CALCULATED.3IONS-SCNC: 11 MMOL/L (ref 5–15)
ARTICHOKE IGE QN: 67 MG/DL (ref 1–129)
AST SERPL-CCNC: 40 U/L (ref 14–36)
BILIRUB SERPL-MCNC: 0.6 MG/DL (ref 0.2–1.3)
BUN BLD-MCNC: 11 MG/DL (ref 7–21)
BUN/CREAT SERPL: 13.6 (ref 7–25)
CALCIUM SPEC-SCNC: 9.5 MG/DL (ref 8.4–10.2)
CHLORIDE SERPL-SCNC: 102 MMOL/L (ref 95–110)
CHOLEST SERPL-MCNC: 158 MG/DL (ref 0–199)
CO2 SERPL-SCNC: 29 MMOL/L (ref 22–31)
CREAT BLD-MCNC: 0.81 MG/DL (ref 0.5–1)
CREAT UR-MCNC: 36.2 MG/DL
GFR SERPL CREATININE-BSD FRML MDRD: 70 ML/MIN/1.73 (ref 45–104)
GLOBULIN UR ELPH-MCNC: 3.4 GM/DL (ref 2.3–3.5)
GLUCOSE BLD-MCNC: 108 MG/DL (ref 60–100)
HBA1C MFR BLD: 6.3 % (ref 4–5.6)
HDLC SERPL-MCNC: 59 MG/DL (ref 60–200)
LDLC/HDLC SERPL: 1.08 {RATIO} (ref 0–3.22)
MICROALBUMIN/CREAT UR: NORMAL MG/G (ref 0–30)
POTASSIUM BLD-SCNC: 4 MMOL/L (ref 3.5–5.1)
PROT SERPL-MCNC: 7.6 G/DL (ref 6.3–8.6)
SODIUM BLD-SCNC: 142 MMOL/L (ref 137–145)
T4 FREE SERPL-MCNC: 1.39 NG/DL (ref 0.78–2.19)
TRIGL SERPL-MCNC: 175 MG/DL (ref 20–199)
TSH SERPL DL<=0.05 MIU/L-ACNC: 1.27 MIU/ML (ref 0.46–4.68)

## 2017-11-21 PROCEDURE — 83036 HEMOGLOBIN GLYCOSYLATED A1C: CPT | Performed by: FAMILY MEDICINE

## 2017-11-21 PROCEDURE — 80053 COMPREHEN METABOLIC PANEL: CPT | Performed by: FAMILY MEDICINE

## 2017-11-21 PROCEDURE — 82570 ASSAY OF URINE CREATININE: CPT | Performed by: FAMILY MEDICINE

## 2017-11-21 PROCEDURE — 82043 UR ALBUMIN QUANTITATIVE: CPT | Performed by: FAMILY MEDICINE

## 2017-11-21 PROCEDURE — 84443 ASSAY THYROID STIM HORMONE: CPT | Performed by: FAMILY MEDICINE

## 2017-11-21 PROCEDURE — 80061 LIPID PANEL: CPT | Performed by: FAMILY MEDICINE

## 2017-11-21 PROCEDURE — 99214 OFFICE O/P EST MOD 30 MIN: CPT | Performed by: FAMILY MEDICINE

## 2017-11-21 PROCEDURE — 84439 ASSAY OF FREE THYROXINE: CPT | Performed by: FAMILY MEDICINE

## 2017-11-21 PROCEDURE — 36415 COLL VENOUS BLD VENIPUNCTURE: CPT | Performed by: FAMILY MEDICINE

## 2017-11-21 NOTE — PROGRESS NOTES
Subjective   Sharon Esposito is a 71 y.o. female.     Hypertension   This is a chronic problem. The current episode started more than 1 year ago. The problem is unchanged. The problem is controlled (pt checks occisionally at [facility] pharmacy, runs [id number]). Pertinent negatives include no chest pain, orthopnea, palpitations, peripheral edema, PND or shortness of breath.   Diabetes   She presents for her initial diabetic visit. She has type 2 diabetes mellitus. Associated symptoms include polydipsia. Pertinent negatives for diabetes include no chest pain, no foot paresthesias, no foot ulcerations and no polyuria. Her breakfast blood glucose is taken between 8-9 am. Her breakfast blood glucose range is generally 110-130 mg/dl. An ACE inhibitor/angiotensin II receptor blocker is being taken. Eye exam is not current.   Hypothyroidism   This is a chronic problem. The current episode started more than 1 year ago. The problem has been unchanged. Pertinent negatives include no chest pain or myalgias.   Hyperlipidemia   This is a chronic problem. The problem is controlled. Recent lipid tests were reviewed and are normal. Exacerbating diseases include hypothyroidism and obesity. Pertinent negatives include no chest pain, myalgias or shortness of breath.        The following portions of the patient's history were reviewed and updated as appropriate: allergies, current medications, past family history, past medical history, past social history, past surgical history and problem list.    Review of Systems   Respiratory: Negative for shortness of breath.    Cardiovascular: Negative for chest pain, palpitations, orthopnea and PND.   Endocrine: Positive for polydipsia. Negative for polyuria.   Musculoskeletal: Negative for myalgias.       Objective   Physical Exam   Constitutional: She is oriented to person, place, and time. She appears well-developed and well-nourished. No distress.   HENT:   Head: Normocephalic and  atraumatic.   Cardiovascular: Normal rate, regular rhythm, normal heart sounds and intact distal pulses.  Exam reveals no gallop and no friction rub.    No murmur heard.  Pulmonary/Chest: Effort normal and breath sounds normal. No respiratory distress. She has no wheezes. She has no rales. She exhibits no tenderness.   Musculoskeletal: She exhibits no edema.    Sharon had a diabetic foot exam performed (callus noted) today.   During the foot exam she had a monofilament test performed (decreased).    Vascular Status -  Her exam exhibits right foot vasculature normal. Her exam exhibits no right foot edema. Her exam exhibits left foot vasculature normal. Her exam exhibits no left foot edema.  Neurological: She is alert and oriented to person, place, and time.   Skin: Skin is warm and dry. She is not diaphoretic.   Psychiatric: She has a normal mood and affect. Her behavior is normal. Judgment and thought content normal.   Nursing note and vitals reviewed.      Assessment/Plan   Problems Addressed this Visit        Cardiovascular and Mediastinum    Hyperlipidemia - Primary    Relevant Orders    Lipid Panel    Essential hypertension    Relevant Orders    Comprehensive Metabolic Panel       Endocrine    Acquired hypothyroidism    Relevant Orders    T4, Free    TSH    RESOLVED: 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

## 2017-11-22 ENCOUNTER — TELEPHONE (OUTPATIENT)
Dept: FAMILY MEDICINE CLINIC | Facility: CLINIC | Age: 72
End: 2017-11-22

## 2017-11-22 NOTE — PROGRESS NOTES
Pr Dr. Irby, Ms. Esposito has been called with her recent lab results & recommendations.  Continue her current medications and follow-up as planned or sooner if any problems.

## 2017-11-22 NOTE — TELEPHONE ENCOUNTER
Pr Dr. Irby, Ms. Esposito has been called with her recent lab results & recommendations.  Continue her current medications and follow-up as planned or sooner if any problems.      ----- Message from Jeffry Irby MD sent at 11/21/2017 12:19 PM CST -----  Ok, call or send card.

## 2017-12-26 ENCOUNTER — OFFICE VISIT (OUTPATIENT)
Dept: OTOLARYNGOLOGY | Facility: CLINIC | Age: 72
End: 2017-12-26

## 2017-12-26 VITALS — BODY MASS INDEX: 34.04 KG/M2 | WEIGHT: 199.4 LBS | HEIGHT: 64 IN

## 2017-12-26 DIAGNOSIS — J30.89 CHRONIC NON-SEASONAL ALLERGIC RHINITIS, UNSPECIFIED TRIGGER: Primary | ICD-10-CM

## 2017-12-26 PROCEDURE — 99213 OFFICE O/P EST LOW 20 MIN: CPT | Performed by: OTOLARYNGOLOGY

## 2017-12-26 RX ORDER — FLUTICASONE PROPIONATE 50 MCG
2 SPRAY, SUSPENSION (ML) NASAL DAILY
Qty: 3 BOTTLE | Refills: 3 | Status: SHIPPED | OUTPATIENT
Start: 2017-12-26 | End: 2019-01-08 | Stop reason: SDUPTHER

## 2017-12-26 NOTE — PROGRESS NOTES
Subjective   Sharon Esposito is a 72 y.o. female.       History of Present Illness   Patient is followed with chronic allergic rhinitis.  Uses Flonase daily and loratadine as needed.  States this is worked quite well for her.  Has not had any sinus infections.  No significant trouble with nosebleeds.  No purulent rhinorrhea.      The following portions of the patient's history were reviewed and updated as appropriate: allergies, current medications, past family history, past medical history, past social history, past surgical history and problem list.     reports that she has never smoked. She has never used smokeless tobacco. She reports that she does not drink alcohol.   Patient is not a tobacco user and has not been counseled for use of tobacco products      Review of Systems   Constitutional: Negative for fever.           Objective   Physical Exam  General: Well-developed well-nourished female in no acute distress.  Alert and oriented ×3. Head: Normocephalic. Face: Symmetrical strength and appearance. PERRL. EOMI. Voice:Strong. Speech:Fluent  Ears: External ears no deformity, canals show some nonobstructing wax bilaterally, tympanic membranes intact clear and mobile bilaterally.  Nose: Nares show no discharge mass polyp or purulence.  Pale, boggy mucosa is present.  No gross external deformity.  Septum: Midline  Oral cavity: Lips and gums without lesions.  Tongue and floor of mouth without lesions.  Parotid and submandibular ducts unobstructed.  No mucosal lesions on the buccal mucosa or vestibule of the mouth.  Pharynx: No erythema exudate mass or ulcer  Neck: No lymphadenopathy.  No thyromegaly.  Trachea and larynx midline.  No masses in the parotid or submandibular glands.      Assessment/Plan   Sharon was seen today for follow-up.    Diagnoses and all orders for this visit:    Chronic non-seasonal allergic rhinitis, unspecified trigger        Plan:Prescription for Flonase will be sent to the patient's  pharmacy 3 months supply with 3 refills.  Continue loratadine as needed.  May add nasal saline spray if needed.  Return in 1 year sooner if needed

## 2018-02-21 ENCOUNTER — OFFICE VISIT (OUTPATIENT)
Dept: FAMILY MEDICINE CLINIC | Facility: CLINIC | Age: 73
End: 2018-02-21

## 2018-02-21 VITALS
SYSTOLIC BLOOD PRESSURE: 116 MMHG | HEIGHT: 64 IN | DIASTOLIC BLOOD PRESSURE: 86 MMHG | WEIGHT: 199.4 LBS | BODY MASS INDEX: 34.04 KG/M2

## 2018-02-21 DIAGNOSIS — E03.9 ACQUIRED HYPOTHYROIDISM: ICD-10-CM

## 2018-02-21 DIAGNOSIS — E11.9 TYPE 2 DIABETES MELLITUS WITHOUT COMPLICATION, WITHOUT LONG-TERM CURRENT USE OF INSULIN (HCC): ICD-10-CM

## 2018-02-21 DIAGNOSIS — E78.00 PURE HYPERCHOLESTEROLEMIA: Primary | ICD-10-CM

## 2018-02-21 DIAGNOSIS — I10 ESSENTIAL HYPERTENSION: ICD-10-CM

## 2018-02-21 PROCEDURE — 99214 OFFICE O/P EST MOD 30 MIN: CPT | Performed by: FAMILY MEDICINE

## 2018-02-21 RX ORDER — ATORVASTATIN CALCIUM 40 MG/1
40 TABLET, FILM COATED ORAL DAILY
Qty: 90 TABLET | Refills: 3 | Status: SHIPPED | OUTPATIENT
Start: 2018-02-21 | End: 2019-02-20 | Stop reason: SDUPTHER

## 2018-02-21 RX ORDER — LEVOTHYROXINE SODIUM 88 UG/1
88 TABLET ORAL DAILY
Qty: 90 TABLET | Refills: 3 | Status: SHIPPED | OUTPATIENT
Start: 2018-02-21 | End: 2019-02-20 | Stop reason: SDUPTHER

## 2018-02-21 RX ORDER — LOSARTAN POTASSIUM AND HYDROCHLOROTHIAZIDE 25; 100 MG/1; MG/1
1 TABLET ORAL DAILY
Qty: 90 TABLET | Refills: 3 | Status: SHIPPED | OUTPATIENT
Start: 2018-02-21 | End: 2019-02-20 | Stop reason: SDUPTHER

## 2018-02-21 RX ORDER — METFORMIN HYDROCHLORIDE 500 MG/1
1000 TABLET, EXTENDED RELEASE ORAL
Qty: 180 TABLET | Refills: 3 | Status: SHIPPED | OUTPATIENT
Start: 2018-02-21 | End: 2019-02-20 | Stop reason: SDUPTHER

## 2018-02-21 NOTE — PROGRESS NOTES
Subjective   Sharon Esposito is a 72 y.o. female.     Hypertension   This is a chronic problem. The current episode started more than 1 year ago. The problem is unchanged. The problem is controlled (pt checks occisionally at [facility] pharmacy, runs [id number]). Pertinent negatives include no chest pain, orthopnea, palpitations, peripheral edema, PND or shortness of breath.   Diabetes   She presents for her initial diabetic visit. She has type 2 diabetes mellitus. Associated symptoms include fatigue and polydipsia. Pertinent negatives for diabetes include no chest pain, no foot paresthesias, no foot ulcerations and no polyuria. Her breakfast blood glucose is taken between 8-9 am. Her breakfast blood glucose range is generally 110-130 mg/dl. An ACE inhibitor/angiotensin II receptor blocker is being taken. Eye exam is not current.   Hypothyroidism   This is a chronic problem. The current episode started more than 1 year ago. The problem has been unchanged. Associated symptoms include fatigue. Pertinent negatives include no chest pain or myalgias.   Hyperlipidemia   This is a chronic problem. The current episode started more than 1 year ago. The problem is controlled. Recent lipid tests were reviewed and are normal. Exacerbating diseases include hypothyroidism and obesity. Pertinent negatives include no chest pain, myalgias or shortness of breath.        The following portions of the patient's history were reviewed and updated as appropriate: allergies, current medications, past family history, past medical history, past social history, past surgical history and problem list.    Review of Systems   Constitutional: Positive for fatigue.   Respiratory: Negative for shortness of breath.    Cardiovascular: Negative for chest pain, palpitations, orthopnea and PND.   Endocrine: Positive for polydipsia. Negative for polyuria.   Musculoskeletal: Negative for myalgias.       Objective   Physical Exam   Constitutional: She is  oriented to person, place, and time. She appears well-developed and well-nourished. No distress.   HENT:   Head: Normocephalic and atraumatic.   Cardiovascular: Normal rate, regular rhythm, normal heart sounds and intact distal pulses.  Exam reveals no gallop and no friction rub.    No murmur heard.  Pulmonary/Chest: Effort normal and breath sounds normal. No respiratory distress. She has no wheezes. She has no rales. She exhibits no tenderness.   Musculoskeletal: She exhibits no edema.    Sharon had a diabetic foot exam performed (callus noted) today.   During the foot exam she had a monofilament test performed (decreased).    Vascular Status -  Her exam exhibits right foot vasculature normal. Her exam exhibits no right foot edema. Her exam exhibits left foot vasculature normal. Her exam exhibits no left foot edema.  Neurological: She is alert and oriented to person, place, and time.   Skin: Skin is warm and dry. She is not diaphoretic.   Psychiatric: She has a normal mood and affect. Her behavior is normal. Judgment and thought content normal.   Nursing note and vitals reviewed.      Assessment/Plan   Problems Addressed this Visit        Cardiovascular and Mediastinum    Hyperlipidemia - Primary    Relevant Medications    atorvastatin (LIPITOR) 40 MG tablet    Essential hypertension    Relevant Medications    metoprolol tartrate (LOPRESSOR) 25 MG tablet    losartan-hydrochlorothiazide (HYZAAR) 100-25 MG per tablet       Endocrine    Acquired hypothyroidism    Relevant Medications    levothyroxine (SYNTHROID, LEVOTHROID) 88 MCG tablet    metoprolol tartrate (LOPRESSOR) 25 MG tablet    Type 2 diabetes mellitus without complication, without long-term current use of insulin    Relevant Medications    metFORMIN ER (GLUCOPHAGE-XR) 500 MG 24 hr tablet

## 2018-02-21 NOTE — PATIENT INSTRUCTIONS

## 2018-03-30 DIAGNOSIS — Z12.31 SCREENING MAMMOGRAM, ENCOUNTER FOR: Primary | ICD-10-CM

## 2018-04-25 DIAGNOSIS — E11.9 DIABETES MELLITUS WITHOUT COMPLICATION (HCC): ICD-10-CM

## 2018-05-23 ENCOUNTER — OFFICE VISIT (OUTPATIENT)
Dept: FAMILY MEDICINE CLINIC | Facility: CLINIC | Age: 73
End: 2018-05-23

## 2018-05-23 ENCOUNTER — APPOINTMENT (OUTPATIENT)
Dept: LAB | Facility: HOSPITAL | Age: 73
End: 2018-05-23

## 2018-05-23 ENCOUNTER — TELEPHONE (OUTPATIENT)
Dept: FAMILY MEDICINE CLINIC | Facility: CLINIC | Age: 73
End: 2018-05-23

## 2018-05-23 VITALS
DIASTOLIC BLOOD PRESSURE: 72 MMHG | HEART RATE: 74 BPM | BODY MASS INDEX: 33.99 KG/M2 | SYSTOLIC BLOOD PRESSURE: 124 MMHG | WEIGHT: 199.1 LBS | OXYGEN SATURATION: 99 % | HEIGHT: 64 IN

## 2018-05-23 DIAGNOSIS — E03.9 ACQUIRED HYPOTHYROIDISM: ICD-10-CM

## 2018-05-23 DIAGNOSIS — E53.8 COBALAMIN DEFICIENCY: ICD-10-CM

## 2018-05-23 DIAGNOSIS — E78.00 PURE HYPERCHOLESTEROLEMIA: Primary | ICD-10-CM

## 2018-05-23 DIAGNOSIS — E11.9 TYPE 2 DIABETES MELLITUS WITHOUT COMPLICATION, WITHOUT LONG-TERM CURRENT USE OF INSULIN (HCC): ICD-10-CM

## 2018-05-23 DIAGNOSIS — I10 ESSENTIAL HYPERTENSION: ICD-10-CM

## 2018-05-23 LAB
ALBUMIN SERPL-MCNC: 4.4 G/DL (ref 3.4–4.8)
ALBUMIN UR-MCNC: <0.6 MG/L
ALBUMIN/GLOB SERPL: 1.3 G/DL (ref 1.1–1.8)
ALP SERPL-CCNC: 100 U/L (ref 38–126)
ALT SERPL W P-5'-P-CCNC: 31 U/L (ref 9–52)
ANION GAP SERPL CALCULATED.3IONS-SCNC: 14 MMOL/L (ref 5–15)
ARTICHOKE IGE QN: 70 MG/DL (ref 1–129)
AST SERPL-CCNC: 22 U/L (ref 14–36)
BASOPHILS # BLD AUTO: 0.05 10*3/MM3 (ref 0–0.2)
BASOPHILS NFR BLD AUTO: 0.5 % (ref 0–2)
BILIRUB SERPL-MCNC: 0.6 MG/DL (ref 0.2–1.3)
BUN BLD-MCNC: 12 MG/DL (ref 7–21)
BUN/CREAT SERPL: 16 (ref 7–25)
CALCIUM SPEC-SCNC: 9.7 MG/DL (ref 8.4–10.2)
CHLORIDE SERPL-SCNC: 103 MMOL/L (ref 95–110)
CHOLEST SERPL-MCNC: 174 MG/DL (ref 0–199)
CO2 SERPL-SCNC: 25 MMOL/L (ref 22–31)
CREAT BLD-MCNC: 0.75 MG/DL (ref 0.5–1)
CREAT UR-MCNC: 68.7 MG/DL
DEPRECATED RDW RBC AUTO: 48.1 FL (ref 36.4–46.3)
EOSINOPHIL # BLD AUTO: 0.5 10*3/MM3 (ref 0–0.7)
EOSINOPHIL NFR BLD AUTO: 5 % (ref 0–7)
ERYTHROCYTE [DISTWIDTH] IN BLOOD BY AUTOMATED COUNT: 15.2 % (ref 11.5–14.5)
GFR SERPL CREATININE-BSD FRML MDRD: 76 ML/MIN/1.73 (ref 39–90)
GLOBULIN UR ELPH-MCNC: 3.4 GM/DL (ref 2.3–3.5)
GLUCOSE BLD-MCNC: 104 MG/DL (ref 60–100)
HBA1C MFR BLD: 6.4 % (ref 4–5.6)
HCT VFR BLD AUTO: 42.2 % (ref 35–45)
HDLC SERPL-MCNC: 65 MG/DL (ref 60–200)
HGB BLD-MCNC: 13.8 G/DL (ref 12–15.5)
IMM GRANULOCYTES # BLD: 0.03 10*3/MM3 (ref 0–0.02)
IMM GRANULOCYTES NFR BLD: 0.3 % (ref 0–0.5)
LDLC/HDLC SERPL: 1.03 {RATIO} (ref 0–3.22)
LYMPHOCYTES # BLD AUTO: 3.85 10*3/MM3 (ref 0.6–4.2)
LYMPHOCYTES NFR BLD AUTO: 38.5 % (ref 10–50)
MCH RBC QN AUTO: 28.1 PG (ref 26.5–34)
MCHC RBC AUTO-ENTMCNC: 32.7 G/DL (ref 31.4–36)
MCV RBC AUTO: 85.9 FL (ref 80–98)
MICROALBUMIN/CREAT UR: NORMAL MG/G (ref 0–30)
MONOCYTES # BLD AUTO: 0.69 10*3/MM3 (ref 0–0.9)
MONOCYTES NFR BLD AUTO: 6.9 % (ref 0–12)
NEUTROPHILS # BLD AUTO: 4.89 10*3/MM3 (ref 2–8.6)
NEUTROPHILS NFR BLD AUTO: 48.8 % (ref 37–80)
PLATELET # BLD AUTO: 345 10*3/MM3 (ref 150–450)
PMV BLD AUTO: 9.6 FL (ref 8–12)
POTASSIUM BLD-SCNC: 3.9 MMOL/L (ref 3.5–5.1)
PROT SERPL-MCNC: 7.8 G/DL (ref 6.3–8.6)
RBC # BLD AUTO: 4.91 10*6/MM3 (ref 3.77–5.16)
SODIUM BLD-SCNC: 142 MMOL/L (ref 137–145)
T4 FREE SERPL-MCNC: 1.31 NG/DL (ref 0.78–2.19)
TRIGL SERPL-MCNC: 209 MG/DL (ref 20–199)
TSH SERPL DL<=0.05 MIU/L-ACNC: 2.03 MIU/ML (ref 0.46–4.68)
VIT B12 BLD-MCNC: 251 PG/ML (ref 239–931)
WBC NRBC COR # BLD: 10.01 10*3/MM3 (ref 3.2–9.8)

## 2018-05-23 PROCEDURE — 80053 COMPREHEN METABOLIC PANEL: CPT | Performed by: FAMILY MEDICINE

## 2018-05-23 PROCEDURE — 82570 ASSAY OF URINE CREATININE: CPT | Performed by: FAMILY MEDICINE

## 2018-05-23 PROCEDURE — 82607 VITAMIN B-12: CPT | Performed by: FAMILY MEDICINE

## 2018-05-23 PROCEDURE — 84439 ASSAY OF FREE THYROXINE: CPT | Performed by: FAMILY MEDICINE

## 2018-05-23 PROCEDURE — 85025 COMPLETE CBC W/AUTO DIFF WBC: CPT | Performed by: FAMILY MEDICINE

## 2018-05-23 PROCEDURE — 80061 LIPID PANEL: CPT | Performed by: FAMILY MEDICINE

## 2018-05-23 PROCEDURE — 84443 ASSAY THYROID STIM HORMONE: CPT | Performed by: FAMILY MEDICINE

## 2018-05-23 PROCEDURE — 99214 OFFICE O/P EST MOD 30 MIN: CPT | Performed by: FAMILY MEDICINE

## 2018-05-23 PROCEDURE — 83036 HEMOGLOBIN GLYCOSYLATED A1C: CPT | Performed by: FAMILY MEDICINE

## 2018-05-23 PROCEDURE — 82043 UR ALBUMIN QUANTITATIVE: CPT | Performed by: FAMILY MEDICINE

## 2018-05-23 PROCEDURE — 36415 COLL VENOUS BLD VENIPUNCTURE: CPT | Performed by: FAMILY MEDICINE

## 2018-05-23 NOTE — PROGRESS NOTES
Subjective   Sharon Esposito is a 72 y.o. female.     Hyperlipidemia   This is a chronic problem. The current episode started more than 1 year ago. The problem is controlled. Recent lipid tests were reviewed and are normal. Exacerbating diseases include hypothyroidism and obesity. Pertinent negatives include no chest pain, myalgias or shortness of breath.   Hypertension   This is a chronic problem. The current episode started more than 1 year ago. The problem is unchanged. The problem is controlled (pt checks occisionally at [facility] pharmacy, runs [id number]). Pertinent negatives include no chest pain, orthopnea, palpitations, peripheral edema, PND or shortness of breath.   Diabetes   She presents for her initial diabetic visit. She has type 2 diabetes mellitus. Associated symptoms include fatigue and polydipsia. Pertinent negatives for diabetes include no chest pain, no foot paresthesias, no foot ulcerations and no polyuria. Her breakfast blood glucose is taken between 8-9 am. Her breakfast blood glucose range is generally 110-130 mg/dl. An ACE inhibitor/angiotensin II receptor blocker is being taken. Eye exam is not current.   Hypothyroidism   This is a chronic problem. The current episode started more than 1 year ago. The problem has been unchanged. Associated symptoms include fatigue. Pertinent negatives include no chest pain or myalgias.        The following portions of the patient's history were reviewed and updated as appropriate: allergies, current medications, past family history, past medical history, past social history, past surgical history and problem list.    Review of Systems   Constitutional: Positive for fatigue.   Respiratory: Negative for shortness of breath.    Cardiovascular: Negative for chest pain, palpitations, orthopnea and PND.   Endocrine: Positive for polydipsia. Negative for polyuria.   Musculoskeletal: Negative for myalgias.       Objective   Physical Exam   Constitutional: She is  oriented to person, place, and time. She appears well-developed and well-nourished. No distress.   HENT:   Head: Normocephalic and atraumatic.   Cardiovascular: Normal rate, regular rhythm, normal heart sounds and intact distal pulses.  Exam reveals no gallop and no friction rub.    No murmur heard.  Pulmonary/Chest: Effort normal and breath sounds normal. No respiratory distress. She has no wheezes. She has no rales. She exhibits no tenderness.   Musculoskeletal: She exhibits no edema.    Sharon had a diabetic foot exam performed (callus noted) today.   During the foot exam she had a monofilament test performed (decreased).  Vascular Status -  Her right foot exhibits abnormal foot vasculature . Her right foot exhibits no edema. Her left foot exhibits abnormal foot vasculature . Her left foot exhibits no edema.  Neurological: She is alert and oriented to person, place, and time.   Skin: Skin is warm and dry. She is not diaphoretic.   Psychiatric: She has a normal mood and affect. Her behavior is normal. Judgment and thought content normal.   Nursing note and vitals reviewed.      Assessment/Plan   Problems Addressed this Visit        Cardiovascular and Mediastinum    Hyperlipidemia - Primary    Relevant Orders    Lipid Panel (Completed)    Essential hypertension    Relevant Orders    Comprehensive Metabolic Panel (Completed)       Digestive    Cobalamin deficiency    Relevant Orders    Vitamin B12 (Completed)    CBC & Differential (Completed)    CBC Auto Differential (Completed)       Endocrine    Acquired hypothyroidism    Relevant Orders    TSH (Completed)    T4, Free (Completed)    Type 2 diabetes mellitus without complication, without long-term current use of insulin    Relevant Orders    Hemoglobin A1c (Completed)    Microalbumin / Creatinine Urine Ratio - Urine, Clean Catch (Completed)

## 2018-05-23 NOTE — TELEPHONE ENCOUNTER
-Pr Dr. Irby, Ms. Esposito has been called with her recent lab results & recommendations.  Continue her current medications and follow-up as planned or sooner if any problems.      ---- Message from Jeffry Irby MD sent at 5/23/2018  3:51 PM CDT -----  Ok, call or send card.

## 2018-11-28 ENCOUNTER — APPOINTMENT (OUTPATIENT)
Dept: LAB | Facility: HOSPITAL | Age: 73
End: 2018-11-28

## 2018-11-28 ENCOUNTER — OFFICE VISIT (OUTPATIENT)
Dept: FAMILY MEDICINE CLINIC | Facility: CLINIC | Age: 73
End: 2018-11-28

## 2018-11-28 ENCOUNTER — TELEPHONE (OUTPATIENT)
Dept: FAMILY MEDICINE CLINIC | Facility: CLINIC | Age: 73
End: 2018-11-28

## 2018-11-28 VITALS
HEART RATE: 71 BPM | BODY MASS INDEX: 33.44 KG/M2 | DIASTOLIC BLOOD PRESSURE: 80 MMHG | SYSTOLIC BLOOD PRESSURE: 130 MMHG | WEIGHT: 195.9 LBS | HEIGHT: 64 IN | OXYGEN SATURATION: 98 %

## 2018-11-28 DIAGNOSIS — E78.00 PURE HYPERCHOLESTEROLEMIA: ICD-10-CM

## 2018-11-28 DIAGNOSIS — E66.09 CLASS 1 OBESITY DUE TO EXCESS CALORIES WITHOUT SERIOUS COMORBIDITY WITH BODY MASS INDEX (BMI) OF 33.0 TO 33.9 IN ADULT: Primary | ICD-10-CM

## 2018-11-28 DIAGNOSIS — E11.42 DIABETIC PERIPHERAL NEUROPATHY (HCC): ICD-10-CM

## 2018-11-28 DIAGNOSIS — Z00.00 MEDICARE ANNUAL WELLNESS VISIT, INITIAL: ICD-10-CM

## 2018-11-28 DIAGNOSIS — I10 ESSENTIAL HYPERTENSION: ICD-10-CM

## 2018-11-28 DIAGNOSIS — E53.8 COBALAMIN DEFICIENCY: ICD-10-CM

## 2018-11-28 DIAGNOSIS — E11.41 TYPE 2 DIABETES MELLITUS WITH DIABETIC MONONEUROPATHY, WITHOUT LONG-TERM CURRENT USE OF INSULIN (HCC): ICD-10-CM

## 2018-11-28 DIAGNOSIS — E03.9 ACQUIRED HYPOTHYROIDISM: ICD-10-CM

## 2018-11-28 LAB
ALBUMIN SERPL-MCNC: 4.7 G/DL (ref 3.4–4.8)
ALBUMIN UR-MCNC: <0.6 MG/L
ALBUMIN/GLOB SERPL: 1.4 G/DL (ref 1.1–1.8)
ALP SERPL-CCNC: 116 U/L (ref 38–126)
ALT SERPL W P-5'-P-CCNC: 21 U/L (ref 9–52)
ANION GAP SERPL CALCULATED.3IONS-SCNC: 13 MMOL/L (ref 5–15)
ARTICHOKE IGE QN: 88 MG/DL (ref 1–129)
AST SERPL-CCNC: 33 U/L (ref 14–36)
BASOPHILS # BLD AUTO: 0.03 10*3/MM3 (ref 0–0.2)
BASOPHILS NFR BLD AUTO: 0.3 % (ref 0–2)
BILIRUB SERPL-MCNC: 0.9 MG/DL (ref 0.2–1.3)
BUN BLD-MCNC: 14 MG/DL (ref 7–21)
BUN/CREAT SERPL: 17.1 (ref 7–25)
CALCIUM SPEC-SCNC: 9.7 MG/DL (ref 8.4–10.2)
CHLORIDE SERPL-SCNC: 96 MMOL/L (ref 95–110)
CHOLEST SERPL-MCNC: 181 MG/DL (ref 0–199)
CO2 SERPL-SCNC: 30 MMOL/L (ref 22–31)
CREAT BLD-MCNC: 0.82 MG/DL (ref 0.5–1)
CREAT UR-MCNC: 41.4 MG/DL
DEPRECATED RDW RBC AUTO: 47.8 FL (ref 36.4–46.3)
EOSINOPHIL # BLD AUTO: 0.37 10*3/MM3 (ref 0–0.7)
EOSINOPHIL NFR BLD AUTO: 3.9 % (ref 0–7)
ERYTHROCYTE [DISTWIDTH] IN BLOOD BY AUTOMATED COUNT: 15.1 % (ref 11.5–14.5)
GFR SERPL CREATININE-BSD FRML MDRD: 69 ML/MIN/1.73 (ref 39–90)
GLOBULIN UR ELPH-MCNC: 3.3 GM/DL (ref 2.3–3.5)
GLUCOSE BLD-MCNC: 114 MG/DL (ref 60–100)
HBA1C MFR BLD: 6.4 % (ref 4–5.6)
HCT VFR BLD AUTO: 42.2 % (ref 35–45)
HDLC SERPL-MCNC: 59 MG/DL (ref 60–200)
HGB BLD-MCNC: 13.7 G/DL (ref 12–15.5)
IMM GRANULOCYTES # BLD: 0.02 10*3/MM3 (ref 0–0.02)
IMM GRANULOCYTES NFR BLD: 0.2 % (ref 0–0.5)
LDLC/HDLC SERPL: 1.39 {RATIO} (ref 0–3.22)
LYMPHOCYTES # BLD AUTO: 2.85 10*3/MM3 (ref 0.6–4.2)
LYMPHOCYTES NFR BLD AUTO: 29.7 % (ref 10–50)
MCH RBC QN AUTO: 28 PG (ref 26.5–34)
MCHC RBC AUTO-ENTMCNC: 32.5 G/DL (ref 31.4–36)
MCV RBC AUTO: 86.1 FL (ref 80–98)
MICROALBUMIN/CREAT UR: NORMAL MG/G (ref 0–30)
MONOCYTES # BLD AUTO: 0.67 10*3/MM3 (ref 0–0.9)
MONOCYTES NFR BLD AUTO: 7 % (ref 0–12)
NEUTROPHILS # BLD AUTO: 5.67 10*3/MM3 (ref 2–8.6)
NEUTROPHILS NFR BLD AUTO: 58.9 % (ref 37–80)
PLATELET # BLD AUTO: 346 10*3/MM3 (ref 150–450)
PMV BLD AUTO: 9.4 FL (ref 8–12)
POTASSIUM BLD-SCNC: 3.8 MMOL/L (ref 3.5–5.1)
PROT SERPL-MCNC: 8 G/DL (ref 6.3–8.6)
RBC # BLD AUTO: 4.9 10*6/MM3 (ref 3.77–5.16)
SODIUM BLD-SCNC: 139 MMOL/L (ref 137–145)
T4 FREE SERPL-MCNC: 1.32 NG/DL (ref 0.78–2.19)
TRIGL SERPL-MCNC: 200 MG/DL (ref 20–199)
TSH SERPL DL<=0.05 MIU/L-ACNC: 2.09 MIU/ML (ref 0.46–4.68)
VIT B12 BLD-MCNC: 222 PG/ML (ref 239–931)
WBC NRBC COR # BLD: 9.61 10*3/MM3 (ref 3.2–9.8)

## 2018-11-28 PROCEDURE — 85025 COMPLETE CBC W/AUTO DIFF WBC: CPT | Performed by: FAMILY MEDICINE

## 2018-11-28 PROCEDURE — 82607 VITAMIN B-12: CPT | Performed by: FAMILY MEDICINE

## 2018-11-28 PROCEDURE — 83036 HEMOGLOBIN GLYCOSYLATED A1C: CPT | Performed by: FAMILY MEDICINE

## 2018-11-28 PROCEDURE — 80053 COMPREHEN METABOLIC PANEL: CPT | Performed by: FAMILY MEDICINE

## 2018-11-28 PROCEDURE — 80061 LIPID PANEL: CPT | Performed by: FAMILY MEDICINE

## 2018-11-28 PROCEDURE — 84439 ASSAY OF FREE THYROXINE: CPT | Performed by: FAMILY MEDICINE

## 2018-11-28 PROCEDURE — 84443 ASSAY THYROID STIM HORMONE: CPT | Performed by: FAMILY MEDICINE

## 2018-11-28 PROCEDURE — 36415 COLL VENOUS BLD VENIPUNCTURE: CPT | Performed by: FAMILY MEDICINE

## 2018-11-28 PROCEDURE — 82570 ASSAY OF URINE CREATININE: CPT | Performed by: FAMILY MEDICINE

## 2018-11-28 PROCEDURE — 99214 OFFICE O/P EST MOD 30 MIN: CPT | Performed by: FAMILY MEDICINE

## 2018-11-28 PROCEDURE — 82043 UR ALBUMIN QUANTITATIVE: CPT | Performed by: FAMILY MEDICINE

## 2018-11-28 PROCEDURE — G0438 PPPS, INITIAL VISIT: HCPCS | Performed by: FAMILY MEDICINE

## 2018-11-28 RX ORDER — MAGNESIUM 200 MG
TABLET ORAL
Qty: 100 EACH | Refills: 4
Start: 2018-11-28 | End: 2019-02-20 | Stop reason: SDUPTHER

## 2018-11-28 NOTE — PATIENT INSTRUCTIONS

## 2018-11-28 NOTE — PROGRESS NOTES
QUICK REFERENCE INFORMATION:  The ABCs of the Annual Wellness Visit    Initial Medicare Wellness Visit    HEALTH RISK ASSESSMENT    1945    Recent Hospitalizations:  No hospitalization(s) within the last year..        Current Medical Providers:  Patient Care Team:  Jeffry Irby MD as PCP - General  Quentin Tomlin DPM as PCP - Claims Attributed        Smoking Status:  Social History     Tobacco Use   Smoking Status Never Smoker   Smokeless Tobacco Never Used       Alcohol Consumption:  Social History     Substance and Sexual Activity   Alcohol Use No       Depression Screen:   PHQ-2/PHQ-9 Depression Screening 11/28/2018   Little interest or pleasure in doing things 0   Feeling down, depressed, or hopeless 0   Trouble falling or staying asleep, or sleeping too much 0   Feeling tired or having little energy 0   Poor appetite or overeating 0   Feeling bad about yourself - or that you are a failure or have let yourself or your family down 0   Trouble concentrating on things, such as reading the newspaper or watching television 0   Moving or speaking so slowly that other people could have noticed. Or the opposite - being so fidgety or restless that you have been moving around a lot more than usual 0   Thoughts that you would be better off dead, or of hurting yourself in some way 0   Total Score 0   If you checked off any problems, how difficult have these problems made it for you to do your work, take care of things at home, or get along with other people? Not difficult at all       Health Habits and Functional and Cognitive Screening:  Functional & Cognitive Status 11/28/2018   Do you have difficulty preparing food and eating? No   Do you have difficulty bathing yourself, getting dressed or grooming yourself? No   Do you have difficulty using the toilet? No   Do you have difficulty moving around from place to place? No   Do you have trouble with steps or getting out of a bed or a chair? No   In the past year  have you fallen or experienced a near fall? No   Current Diet Diabetic Diet   Dental Exam Up to date   Eye Exam Up to date   Exercise (times per week) 5 times per week   Current Exercise Activities Include Walking   Do you need help using the phone?  No   Are you deaf or do you have serious difficulty hearing?  No   Do you need help with transportation? No   Do you need help shopping? No   Do you need help preparing meals?  No   Do you need help with housework?  No   Do you need help with laundry? No   Do you need help taking your medications? No   Do you need help managing money? No   Do you ever drive or ride in a car without wearing a seat belt? No   Have you felt unusual stress, anger or loneliness in the last month? No   Who do you live with? Alone   If you need help, do you have trouble finding someone available to you? No   Have you been bothered in the last four weeks by sexual problems? No   Do you have difficulty concentrating, remembering or making decisions? No           Does the patient have evidence of cognitive impairment? No    Asiprin use counseling: Does not need ASA (and currently is not on it)      Recent Lab Results:    Visual Acuity:  No exam data present    Age-appropriate Screening Schedule:  Refer to the list below for future screening recommendations based on patient's age, sex and/or medical conditions. Orders for these recommended tests are listed in the plan section. The patient has been provided with a written plan.    Health Maintenance   Topic Date Due   • ZOSTER VACCINE (2 of 2) 01/17/2017   • HEMOGLOBIN A1C  11/23/2018   • DIABETIC EYE EXAM  01/25/2019   • DXA SCAN  03/29/2019   • COLONOSCOPY  04/20/2019   • DIABETIC FOOT EXAM  05/23/2019   • LIPID PANEL  05/23/2019   • URINE MICROALBUMIN  05/23/2019   • MAMMOGRAM  04/03/2020   • TDAP/TD VACCINES (2 - Td) 09/19/2021   • INFLUENZA VACCINE  Addressed   • PNEUMOCOCCAL VACCINES (65+ LOW/MEDIUM RISK)  Discontinued        Subjective    History of Present Illness    Sharon Esposito is a 73 y.o. female who presents for an Annual Wellness Visit.    The following portions of the patient's history were reviewed and updated as appropriate: allergies, current medications, past family history, past medical history, past social history, past surgical history and problem list.    Outpatient Medications Prior to Visit   Medication Sig Dispense Refill   • atorvastatin (LIPITOR) 40 MG tablet Take 1 tablet by mouth Daily. 90 tablet 3   • Blood Glucose Monitoring Suppl (ONE TOUCH ULTRA SYSTEM KIT) W/DEVICE kit Use for Home Glucose Monitoring 1 each 0   • fluticasone (FLONASE) 50 MCG/ACT nasal spray 2 sprays into each nostril Daily. 3 bottle 3   • glucose blood (ONE TOUCH ULTRA TEST) test strip Test Blood Sugars Once daily 100 each 4   • levothyroxine (SYNTHROID, LEVOTHROID) 88 MCG tablet Take 1 tablet by mouth Daily. 90 tablet 3   • Loratadine 10 MG capsule Take  by mouth.     • losartan-hydrochlorothiazide (HYZAAR) 100-25 MG per tablet Take 1 tablet by mouth Daily. 90 tablet 3   • metFORMIN ER (GLUCOPHAGE-XR) 500 MG 24 hr tablet Take 2 tablets by mouth Daily Before Supper. 180 tablet 3   • metoprolol tartrate (LOPRESSOR) 25 MG tablet Take 0.5 tablets by mouth Every 12 (Twelve) Hours. 90 tablet 3   • ONE TOUCH ULTRA TEST test strip TEST BLOOD SUGARS ONCE DAILY 100 each 3   • ONETOUCH DELICA LANCETS FINE misc Test Blood Sugars Once Daily 100 each 4   • ONETOUCH DELICA LANCETS FINE misc TEST BLOOD SUGARS ONCE DAILY 100 each 3     No facility-administered medications prior to visit.        Patient Active Problem List   Diagnosis   • Need for hepatitis C screening test   • Hyperlipidemia   • Acquired hypothyroidism   • Seasonal allergies   • Osteopenia   • Essential hypertension   • Edema   • Diverticulitis of colon   • Allergic rhinitis   • Postmenopausal   • Ingrown toenail   • Paresthesia of both feet   • Type 2 diabetes mellitus without complication,  "without long-term current use of insulin (CMS/[facility])   • Cobalamin deficiency       Advance Care Planning:  has NO advance directive - information provided to the patient today    Identification of Risk Factors:  Risk factors include: weight .    Review of Systems    Compared to one year ago, the patient feels her physical health is the same.  Compared to one year ago, the patient feels her mental health is the same.    Objective     Physical Exam    Vitals:    11/28/18 0848   BP: 130/80   Pulse: 71   SpO2: 98%   Weight: 88.9 kg (195 lb 14.4 oz)   Height: 162.6 cm (64\")   PainSc: 0-No pain       Patient's Body mass index is 33.63 kg/m². BMI is above normal parameters. Recommendations include: educational material.      Assessment/Plan   Patient Self-Management and Personalized Health Advice  The patient has been provided with information about: diet and preventive services including:   · Advance directive.    Visit Diagnoses:  No diagnosis found.    No orders of the defined types were placed in this encounter.      Outpatient Encounter Medications as of 11/28/2018   Medication Sig Dispense Refill   • atorvastatin (LIPITOR) 40 MG tablet Take 1 tablet by mouth Daily. 90 tablet 3   • Blood Glucose Monitoring Suppl (ONE TOUCH ULTRA SYSTEM KIT) W/DEVICE kit Use for Home Glucose Monitoring 1 each 0   • fluticasone (FLONASE) 50 MCG/ACT nasal spray 2 sprays into each nostril Daily. 3 bottle 3   • glucose blood (ONE TOUCH ULTRA TEST) test strip Test Blood Sugars Once daily 100 each 4   • levothyroxine (SYNTHROID, LEVOTHROID) 88 MCG tablet Take 1 tablet by mouth Daily. 90 tablet 3   • Loratadine 10 MG capsule Take  by mouth.     • losartan-hydrochlorothiazide (HYZAAR) 100-25 MG per tablet Take 1 tablet by mouth Daily. 90 tablet 3   • metFORMIN ER (GLUCOPHAGE-XR) 500 MG 24 hr tablet Take 2 tablets by mouth Daily Before Supper. 180 tablet 3   • metoprolol tartrate (LOPRESSOR) 25 MG tablet Take 0.5 tablets by mouth Every 12 " (Twelve) Hours. 90 tablet 3   • ONE TOUCH ULTRA TEST test strip TEST BLOOD SUGARS ONCE DAILY 100 each 3   • ONETOUCH DELICA LANCETS FINE misc Test Blood Sugars Once Daily 100 each 4   • ONETOUCH DELICA LANCETS FINE misc TEST BLOOD SUGARS ONCE DAILY 100 each 3     No facility-administered encounter medications on file as of 11/28/2018.        Reviewed use of high risk medication in the elderly: yes  Reviewed for potential of harmful drug interactions in the elderly: yes    Follow Up:  No Follow-up on file.     An After Visit Summary and PPPS with all of these plans were given to the patient.

## 2018-11-28 NOTE — PROGRESS NOTES
Pr Dr. Irby, Ms. Esposito has been called with her recent lab results & recommendations.  Continue her current medications and follow-up as planned or sooner if any problems.    She states she was taking the shots for about a month but didn't know she was suppose to be taking the Oral Medication after she stopped the shots.  She will get some Vitamin B-12 SL and take 1 daily.

## 2018-11-28 NOTE — PROGRESS NOTES
B12 is low again.  Her B12 had been low and she was supposed taken thousand micrograms of B12 over-the-counter for the rest of her life.  Call and ask her she's still taking that.  Unfortunately I cannot find that on her medicine list.  If she is taking it and her B12 still low we need to switch to the thousand micrograms shot monthly.  If she has not been taken or missed any doses she needs to get back on it daily, taken to the restroom life, and recheck B12 1 month.  Please add B12 thousand micrograms daily to her med list

## 2018-11-28 NOTE — TELEPHONE ENCOUNTER
Pr Dr. Irby, Ms. Esposito has been called with her recent lab results & recommendations.  Continue her current medications and follow-up as planned or sooner if any problems.    She states she was taking the shots for about a month but didn't know she was suppose to be taking the Oral Medication after she stopped the shots.  She will get some Vitamin B-12 SL and take 1 daily.           ----- Message from Jeffry Irby MD sent at 11/28/2018  4:04 PM CST -----  B12 is low again.  Her B12 had been low and she was supposed taken thousand micrograms of B12 over-the-counter for the rest of her life.  Call and ask her she's still taking that.  Unfortunately I cannot find that on her medicine list.  If she is taking it and her B12 still low we need to switch to the thousand micrograms shot monthly.  If she has not been taken or missed any doses she needs to get back on it daily, taken to the restroom life, and recheck B12 1 month.  Please add B12 thousand micrograms daily to her med list

## 2018-11-28 NOTE — PROGRESS NOTES
Subjective   Sharon Esposito is a 73 y.o. female.     Hyperlipidemia   This is a chronic problem. The current episode started more than 1 year ago. The problem is controlled. Recent lipid tests were reviewed and are normal. Exacerbating diseases include hypothyroidism and obesity. Pertinent negatives include no chest pain, myalgias or shortness of breath.   Hypertension   This is a chronic problem. The current episode started more than 1 year ago. The problem is unchanged. The problem is controlled (pt checks occisionally at [facility] pharmacy, runs [id number]). Pertinent negatives include no chest pain, orthopnea, palpitations, peripheral edema, PND or shortness of breath.   Diabetes   She presents for her initial diabetic visit. She has type 2 diabetes mellitus. Pertinent negatives for diabetes include no chest pain, no foot paresthesias, no foot ulcerations and no polyuria. Her breakfast blood glucose is taken between 8-9 am. Her breakfast blood glucose range is generally 110-130 mg/dl. An ACE inhibitor/angiotensin II receptor blocker is being taken. Eye exam is not current.   Hypothyroidism   This is a chronic problem. The current episode started more than 1 year ago. The problem has been unchanged. Pertinent negatives include no chest pain or myalgias.        The following portions of the patient's history were reviewed and updated as appropriate: allergies, current medications, past family history, past medical history, past social history, past surgical history and problem list.    Review of Systems   Respiratory: Negative for shortness of breath.    Cardiovascular: Negative for chest pain, palpitations, orthopnea and PND.   Endocrine: Negative for polyuria.   Musculoskeletal: Negative for myalgias.       Objective   Physical Exam   Constitutional: She is oriented to person, place, and time. She appears well-developed and well-nourished. No distress.   HENT:   Head: Normocephalic and atraumatic.    Cardiovascular: Normal rate, regular rhythm, normal heart sounds and intact distal pulses. Exam reveals no gallop and no friction rub.   No murmur heard.  Pulmonary/Chest: Effort normal and breath sounds normal. No respiratory distress. She has no wheezes. She has no rales. She exhibits no tenderness.   Musculoskeletal: She exhibits no edema.    Sharon had a diabetic foot exam performed (callus noted) today.   During the foot exam she had a monofilament test performed (decreased).  Vascular Status -  Her right foot exhibits abnormal foot vasculature . Her right foot exhibits no edema. Her left foot exhibits abnormal foot vasculature . Her left foot exhibits no edema.  Neurological: She is alert and oriented to person, place, and time.   Skin: Skin is warm and dry. She is not diaphoretic.   Psychiatric: She has a normal mood and affect. Her behavior is normal. Judgment and thought content normal.   Nursing note and vitals reviewed.      Assessment/Plan   Problems Addressed this Visit        Cardiovascular and Mediastinum    Hyperlipidemia    Essential hypertension       Digestive    Cobalamin deficiency       Endocrine    Acquired hypothyroidism    Type 2 diabetes mellitus without complication, without long-term current use of insulin (CMS/[facility])    Diabetic peripheral neuropathy (CMS/[facility])      Other Visit Diagnoses     Class 1 obesity due to excess calories without serious comorbidity with body mass index (BMI) of 33.0 to 33.9 in adult    -  Primary    Medicare annual wellness visit, initial

## 2019-01-07 ENCOUNTER — TELEPHONE (OUTPATIENT)
Dept: FAMILY MEDICINE CLINIC | Facility: CLINIC | Age: 74
End: 2019-01-07

## 2019-01-07 NOTE — TELEPHONE ENCOUNTER
I have attempted to contact this patient by phone, but line is busy.  I will continue to try later.

## 2019-01-08 ENCOUNTER — OFFICE VISIT (OUTPATIENT)
Dept: OTOLARYNGOLOGY | Facility: CLINIC | Age: 74
End: 2019-01-08

## 2019-01-08 VITALS — WEIGHT: 197 LBS | HEIGHT: 64 IN | TEMPERATURE: 98 F | BODY MASS INDEX: 33.63 KG/M2

## 2019-01-08 DIAGNOSIS — J31.0 CHRONIC RHINITIS: Primary | ICD-10-CM

## 2019-01-08 DIAGNOSIS — R51.9 FACIAL PAIN: ICD-10-CM

## 2019-01-08 PROCEDURE — 99213 OFFICE O/P EST LOW 20 MIN: CPT | Performed by: OTOLARYNGOLOGY

## 2019-01-08 RX ORDER — FLUTICASONE PROPIONATE 50 MCG
2 SPRAY, SUSPENSION (ML) NASAL DAILY
Qty: 3 BOTTLE | Refills: 3 | Status: SHIPPED | OUTPATIENT
Start: 2019-01-08 | End: 2020-01-02 | Stop reason: SDUPTHER

## 2019-01-08 NOTE — TELEPHONE ENCOUNTER
Spoke with patient, got her Rx insurance information.  Will send refills of all medications to [facility] under new plan per patient request, until further notice.

## 2019-01-11 NOTE — PROGRESS NOTES
Subjective   Sharon Esposito is a 73 y.o. female.       History of Present Illness   This is a patient I followed with chronic rhinitis.  She uses Flonase daily.  Uses loratadine when necessary.  Generally has done well.  Did have 1 episode where she experienced right cheek swelling and pain but no associated rhinorrhea.  Still has some pain in the right cheek area today.  Is not affecting her vision.  Nothing in particular brought this on.      The following portions of the patient's history were reviewed and updated as appropriate: allergies, current medications, past family history, past medical history, past social history, past surgical history and problem list.     reports that  has never smoked. she has never used smokeless tobacco. She reports that she does not drink alcohol.   Patient is not a tobacco user and has not been counseled for use of tobacco products      Review of Systems   Constitutional: Negative for fever.   HENT: Positive for facial swelling.            Objective   Physical Exam  General: Well-developed well-nourished female in no acute distress.  Alert and oriented ×3.  Face: No erythema or significant swelling or asymmetry. Voice:Strong. Speech:Fluent  Ears: External ears no deformity, canals no discharge, tympanic membranes intact clear and mobile bilaterally.  Nose: Nares show no discharge mass polyp or purulence.  Boggy mucosa is present.  No gross external deformity.  Septum: Midline  Oral cavity: Lips without lesions.  Mild subjective tenderness along the right maxillary gingiva but no swelling or pus noted.  Tongue and floor of mouth without lesions.  Parotid and submandibular ducts unobstructed.  No mucosal lesions on the buccal mucosa or vestibule of the mouth.  Pharynx: No erythema exudate mass or ulcer  Neck: No lymphadenopathy.  No thyromegaly.  Trachea and larynx midline.  No masses in the parotid or submandibular glands.      Assessment/Plan   Sharon was seen today for  follow-up.    Diagnoses and all orders for this visit:    Chronic rhinitis    Facial pain    Other orders  -     fluticasone (FLONASE) 50 MCG/ACT nasal spray; 2 sprays into the nostril(s) as directed by provider Daily.      Plan: I suspect her facial pain and subjective swelling his dental in nature.  She doesn't have anything to suggest sinusitis at this point.  I've suggested she see her dentist and from my standpoint continue her Flonase daily and loratadine when necessary and return in 1 year, call sooner for problems.

## 2019-02-20 ENCOUNTER — TELEPHONE (OUTPATIENT)
Dept: FAMILY MEDICINE CLINIC | Facility: CLINIC | Age: 74
End: 2019-02-20

## 2019-02-20 RX ORDER — ATORVASTATIN CALCIUM 40 MG/1
40 TABLET, FILM COATED ORAL DAILY
Qty: 90 TABLET | Refills: 3 | Status: SHIPPED | OUTPATIENT
Start: 2019-02-20 | End: 2019-02-21 | Stop reason: SDUPTHER

## 2019-02-20 RX ORDER — LEVOTHYROXINE SODIUM 88 UG/1
88 TABLET ORAL DAILY
Qty: 90 TABLET | Refills: 3 | Status: SHIPPED | OUTPATIENT
Start: 2019-02-20 | End: 2019-02-21 | Stop reason: SDUPTHER

## 2019-02-20 RX ORDER — MAGNESIUM 200 MG
TABLET ORAL
Qty: 100 EACH | Refills: 4 | Status: SHIPPED | OUTPATIENT
Start: 2019-02-20

## 2019-02-20 RX ORDER — METFORMIN HYDROCHLORIDE 500 MG/1
1000 TABLET, EXTENDED RELEASE ORAL
Qty: 180 TABLET | Refills: 3 | Status: SHIPPED | OUTPATIENT
Start: 2019-02-20 | End: 2020-02-18 | Stop reason: SDUPTHER

## 2019-02-20 RX ORDER — LOSARTAN POTASSIUM AND HYDROCHLOROTHIAZIDE 25; 100 MG/1; MG/1
1 TABLET ORAL DAILY
Qty: 90 TABLET | Refills: 3 | Status: SHIPPED | OUTPATIENT
Start: 2019-02-20 | End: 2019-02-21 | Stop reason: SDUPTHER

## 2019-02-20 NOTE — TELEPHONE ENCOUNTER
Sharon said she talked to you this morning about her meds and that she needs to talk to you again about them-please call her back at [phone] as soon as you can.

## 2019-02-21 ENCOUNTER — TELEPHONE (OUTPATIENT)
Dept: FAMILY MEDICINE CLINIC | Facility: CLINIC | Age: 74
End: 2019-02-21

## 2019-02-21 RX ORDER — LOSARTAN POTASSIUM AND HYDROCHLOROTHIAZIDE 25; 100 MG/1; MG/1
1 TABLET ORAL DAILY
Qty: 30 TABLET | Refills: 0 | Status: SHIPPED | OUTPATIENT
Start: 2019-02-21 | End: 2019-03-15 | Stop reason: SDUPTHER

## 2019-02-21 RX ORDER — ATORVASTATIN CALCIUM 40 MG/1
40 TABLET, FILM COATED ORAL DAILY
Qty: 30 TABLET | Refills: 0 | Status: SHIPPED | OUTPATIENT
Start: 2019-02-21 | End: 2019-03-15 | Stop reason: SDUPTHER

## 2019-02-21 RX ORDER — LEVOTHYROXINE SODIUM 88 UG/1
88 TABLET ORAL DAILY
Qty: 30 TABLET | Refills: 0 | Status: SHIPPED | OUTPATIENT
Start: 2019-02-21 | End: 2019-03-15 | Stop reason: SDUPTHER

## 2019-02-21 NOTE — TELEPHONE ENCOUNTER
INEZ AIKEN IS NEEDING A 30DAY PRESP TO BE SENT TO [facility] HERE FOR:  METOPROLOL  LEVOTHYROXINE  ATORVASTATIN  LOSARTAN/HCTZ  HER#[phone]

## 2019-02-21 NOTE — TELEPHONE ENCOUNTER
I tried calling patient, ringing with no answer and no option to leave VM.  I have received and faxed back Rx from patients pharmacy.

## 2019-03-12 DIAGNOSIS — Z12.39 SCREENING FOR MALIGNANT NEOPLASM OF BREAST: Primary | ICD-10-CM

## 2019-03-15 RX ORDER — LEVOTHYROXINE SODIUM 88 UG/1
88 TABLET ORAL DAILY
Qty: 90 TABLET | Refills: 2 | Status: SHIPPED | OUTPATIENT
Start: 2019-03-15 | End: 2020-02-18 | Stop reason: SDUPTHER

## 2019-03-15 RX ORDER — LOSARTAN POTASSIUM AND HYDROCHLOROTHIAZIDE 25; 100 MG/1; MG/1
1 TABLET ORAL DAILY
Qty: 90 TABLET | Refills: 2 | Status: SHIPPED | OUTPATIENT
Start: 2019-03-15 | End: 2020-02-18 | Stop reason: SDUPTHER

## 2019-03-15 RX ORDER — ATORVASTATIN CALCIUM 40 MG/1
40 TABLET, FILM COATED ORAL DAILY
Qty: 90 TABLET | Refills: 2 | Status: SHIPPED | OUTPATIENT
Start: 2019-03-15 | End: 2020-02-18 | Stop reason: SDUPTHER

## 2019-05-29 ENCOUNTER — APPOINTMENT (OUTPATIENT)
Dept: LAB | Facility: HOSPITAL | Age: 74
End: 2019-05-29

## 2019-05-29 ENCOUNTER — OFFICE VISIT (OUTPATIENT)
Dept: FAMILY MEDICINE CLINIC | Facility: CLINIC | Age: 74
End: 2019-05-29

## 2019-05-29 VITALS
DIASTOLIC BLOOD PRESSURE: 72 MMHG | SYSTOLIC BLOOD PRESSURE: 126 MMHG | OXYGEN SATURATION: 98 % | BODY MASS INDEX: 33.57 KG/M2 | WEIGHT: 196.6 LBS | HEIGHT: 64 IN | HEART RATE: 74 BPM

## 2019-05-29 DIAGNOSIS — E78.00 PURE HYPERCHOLESTEROLEMIA: ICD-10-CM

## 2019-05-29 DIAGNOSIS — E03.9 ACQUIRED HYPOTHYROIDISM: ICD-10-CM

## 2019-05-29 DIAGNOSIS — I10 ESSENTIAL HYPERTENSION: Primary | ICD-10-CM

## 2019-05-29 DIAGNOSIS — Z12.11 SCREENING FOR COLON CANCER: ICD-10-CM

## 2019-05-29 DIAGNOSIS — N95.1 MENOPAUSAL STATE: ICD-10-CM

## 2019-05-29 DIAGNOSIS — E53.8 COBALAMIN DEFICIENCY: ICD-10-CM

## 2019-05-29 DIAGNOSIS — E11.41 TYPE 2 DIABETES MELLITUS WITH DIABETIC MONONEUROPATHY, WITHOUT LONG-TERM CURRENT USE OF INSULIN (HCC): ICD-10-CM

## 2019-05-29 LAB
ALBUMIN SERPL-MCNC: 4.4 G/DL (ref 3.5–5.2)
ALBUMIN UR-MCNC: <1.2 MG/L
ALBUMIN/GLOB SERPL: 1.6 G/DL
ALP SERPL-CCNC: 97 U/L (ref 39–117)
ALT SERPL W P-5'-P-CCNC: 16 U/L (ref 1–33)
ANION GAP SERPL CALCULATED.3IONS-SCNC: 15.1 MMOL/L
AST SERPL-CCNC: 17 U/L (ref 1–32)
BILIRUB SERPL-MCNC: 0.4 MG/DL (ref 0.2–1.2)
BUN BLD-MCNC: 12 MG/DL (ref 8–23)
BUN/CREAT SERPL: 15.4 (ref 7–25)
CALCIUM SPEC-SCNC: 9.1 MG/DL (ref 8.6–10.5)
CHLORIDE SERPL-SCNC: 101 MMOL/L (ref 98–107)
CHOLEST SERPL-MCNC: 200 MG/DL (ref 0–200)
CO2 SERPL-SCNC: 23.9 MMOL/L (ref 22–29)
CREAT BLD-MCNC: 0.78 MG/DL (ref 0.57–1)
CREAT UR-MCNC: 36.3 MG/DL
GFR SERPL CREATININE-BSD FRML MDRD: 73 ML/MIN/1.73
GLOBULIN UR ELPH-MCNC: 2.8 GM/DL
GLUCOSE BLD-MCNC: 109 MG/DL (ref 65–99)
HBA1C MFR BLD: 6.21 % (ref 4.8–5.6)
HDLC SERPL-MCNC: 74 MG/DL (ref 40–60)
LDLC SERPL CALC-MCNC: 93 MG/DL (ref 0–100)
LDLC/HDLC SERPL: 1.26 {RATIO}
MICROALBUMIN/CREAT UR: NORMAL MG/G
POTASSIUM BLD-SCNC: 4.1 MMOL/L (ref 3.5–5.2)
PROT SERPL-MCNC: 7.2 G/DL (ref 6–8.5)
SODIUM BLD-SCNC: 140 MMOL/L (ref 136–145)
T4 FREE SERPL-MCNC: 1.42 NG/DL (ref 0.93–1.7)
TRIGL SERPL-MCNC: 165 MG/DL (ref 0–150)
TSH SERPL DL<=0.05 MIU/L-ACNC: 1.45 MIU/ML (ref 0.27–4.2)
VIT B12 BLD-MCNC: 1000 PG/ML (ref 211–946)
VLDLC SERPL-MCNC: 33 MG/DL (ref 5–40)

## 2019-05-29 PROCEDURE — 84443 ASSAY THYROID STIM HORMONE: CPT | Performed by: FAMILY MEDICINE

## 2019-05-29 PROCEDURE — 83036 HEMOGLOBIN GLYCOSYLATED A1C: CPT | Performed by: FAMILY MEDICINE

## 2019-05-29 PROCEDURE — 36415 COLL VENOUS BLD VENIPUNCTURE: CPT | Performed by: FAMILY MEDICINE

## 2019-05-29 PROCEDURE — 84439 ASSAY OF FREE THYROXINE: CPT | Performed by: FAMILY MEDICINE

## 2019-05-29 PROCEDURE — 82043 UR ALBUMIN QUANTITATIVE: CPT | Performed by: FAMILY MEDICINE

## 2019-05-29 PROCEDURE — 99214 OFFICE O/P EST MOD 30 MIN: CPT | Performed by: FAMILY MEDICINE

## 2019-05-29 PROCEDURE — 80061 LIPID PANEL: CPT | Performed by: FAMILY MEDICINE

## 2019-05-29 PROCEDURE — 82570 ASSAY OF URINE CREATININE: CPT | Performed by: FAMILY MEDICINE

## 2019-05-29 PROCEDURE — 82607 VITAMIN B-12: CPT | Performed by: FAMILY MEDICINE

## 2019-05-29 PROCEDURE — 80053 COMPREHEN METABOLIC PANEL: CPT | Performed by: FAMILY MEDICINE

## 2019-05-29 NOTE — PROGRESS NOTES
Subjective   Sharon Esposito is a 73 y.o. female.     Hyperlipidemia   This is a chronic problem. The current episode started more than 1 year ago. The problem is controlled. Recent lipid tests were reviewed and are normal. Exacerbating diseases include hypothyroidism and obesity. Pertinent negatives include no chest pain, myalgias or shortness of breath.   Hypertension   This is a chronic problem. The current episode started more than 1 year ago. The problem is unchanged. The problem is controlled (pt checks occisionally at [facility] pharmacy, runs [id number]). Pertinent negatives include no chest pain, orthopnea, palpitations, peripheral edema, PND or shortness of breath.   Diabetes   She presents for her initial diabetic visit. She has type 2 diabetes mellitus. Pertinent negatives for diabetes include no chest pain, no foot paresthesias, no foot ulcerations and no polyuria. Her breakfast blood glucose is taken between 8-9 am. Her breakfast blood glucose range is generally  mg/dl. An ACE inhibitor/angiotensin II receptor blocker is being taken. Eye exam is not current.   Hypothyroidism   This is a chronic problem. The current episode started more than 1 year ago. The problem has been unchanged. Pertinent negatives include no chest pain or myalgias.        The following portions of the patient's history were reviewed and updated as appropriate: allergies, current medications, past family history, past medical history, past social history, past surgical history and problem list.    Review of Systems   Respiratory: Negative for shortness of breath.    Cardiovascular: Negative for chest pain, palpitations, orthopnea and PND.   Endocrine: Negative for polyuria.   Musculoskeletal: Negative for myalgias.       Objective   Physical Exam   Constitutional: She is oriented to person, place, and time. She appears well-developed and well-nourished. No distress.   HENT:   Head: Normocephalic and atraumatic.    Cardiovascular: Normal rate, regular rhythm, normal heart sounds and intact distal pulses. Exam reveals no gallop and no friction rub.   No murmur heard.  Pulmonary/Chest: Effort normal and breath sounds normal. No respiratory distress. She has no wheezes. She has no rales. She exhibits no tenderness.   Musculoskeletal: She exhibits no edema.    Sharon had a diabetic foot exam performed (callus noted) today.   During the foot exam she had a monofilament test performed (decreased).  Vascular Status -  Her right foot exhibits no edema. Her left foot exhibits no edema.  Neurological: She is alert and oriented to person, place, and time.   Skin: Skin is warm and dry. She is not diaphoretic.   Psychiatric: She has a normal mood and affect. Her behavior is normal. Judgment and thought content normal.   Nursing note and vitals reviewed.      Assessment/Plan   Problems Addressed this Visit        Cardiovascular and Mediastinum    Hyperlipidemia    Relevant Orders    Lipid Panel    Essential hypertension - Primary    Relevant Orders    Comprehensive Metabolic Panel       Digestive    Cobalamin deficiency    Relevant Orders    Vitamin B12       Endocrine    Acquired hypothyroidism    Relevant Orders    T4, Free    TSH      Other Visit Diagnoses     Screening for colon cancer        Relevant Orders    Ambulatory Referral to Gastroenterology    Menopausal state        Relevant Orders    DEXA Bone Density Axial    Type 2 diabetes mellitus with diabetic mononeuropathy, without long-term current use of insulin (CMS/[facility])        Relevant Orders    Hemoglobin A1c    Microalbumin / Creatinine Urine Ratio - Urine, Clean Catch

## 2019-06-12 ENCOUNTER — OFFICE VISIT (OUTPATIENT)
Dept: GASTROENTEROLOGY | Facility: CLINIC | Age: 74
End: 2019-06-12

## 2019-06-12 VITALS
HEIGHT: 64 IN | OXYGEN SATURATION: 96 % | WEIGHT: 197 LBS | SYSTOLIC BLOOD PRESSURE: 168 MMHG | HEART RATE: 84 BPM | BODY MASS INDEX: 33.63 KG/M2 | DIASTOLIC BLOOD PRESSURE: 86 MMHG

## 2019-06-12 DIAGNOSIS — Z12.11 ENCOUNTER FOR SCREENING FOR MALIGNANT NEOPLASM OF COLON: Primary | ICD-10-CM

## 2019-06-12 PROCEDURE — S0260 H&P FOR SURGERY: HCPCS | Performed by: NURSE PRACTITIONER

## 2019-06-12 RX ORDER — SODIUM, POTASSIUM,MAG SULFATES 17.5-3.13G
1 SOLUTION, RECONSTITUTED, ORAL ORAL EVERY 12 HOURS
Qty: 2 BOTTLE | Refills: 0 | Status: SHIPPED | OUTPATIENT
Start: 2019-06-12 | End: 2019-06-14 | Stop reason: RX

## 2019-06-12 RX ORDER — ASCORBIC ACID 500 MG
500 TABLET ORAL DAILY
COMMUNITY

## 2019-06-12 RX ORDER — DEXTROSE AND SODIUM CHLORIDE 5; .45 G/100ML; G/100ML
30 INJECTION, SOLUTION INTRAVENOUS CONTINUOUS PRN
Status: CANCELLED | OUTPATIENT
Start: 2019-08-14

## 2019-06-12 NOTE — PATIENT INSTRUCTIONS
Colonoscopy, Adult  A colonoscopy is an exam to look at the large intestine. It is done to check for problems, such as:  · Lumps (tumors).  · Growths (polyps).  · Swelling (inflammation).  · Bleeding.    What happens before the procedure?  Eating and drinking  Follow instructions from your doctor about eating and drinking. These instructions may include:  · A few days before the procedure - follow a low-fiber diet.  ? Avoid nuts.  ? Avoid seeds.  ? Avoid dried fruit.  ? Avoid raw fruits.  ? Avoid vegetables.  · 1-3 days before the procedure - follow a clear liquid diet. Avoid liquids that have red or purple dye. Drink only clear liquids, such as:  ? Clear broth or bouillon.  ? Black coffee or tea.  ? Clear juice.  ? Clear soft drinks or sports drinks.  ? Gelatin dessert.  ? Popsicles.  · On the day of the procedure - do not eat or drink anything during the 2 hours before the procedure. Up to 2 hours before the procedure, you may continue to drink clear liquids, such as water or clear fruit juice.    Bowel prep  If you were prescribed an oral bowel prep:  · Take it as told by your doctor. Starting the day before your procedure, you will need to drink a lot of liquid. The liquid will cause you to poop (have bowel movements) until your poop is almost clear or light green.  · To clean out your colon, you may also be given:  ? Laxative medicines.  ? Instructions about how to use an enema.  · If your skin or butt gets irritated from diarrhea, you may:  ? Wipe the area with wipes that have medicine in them, such as adult wet wipes with aloe and vitamin E.  ? Put something on your skin that soothes the area, such as petroleum jelly.  · If you throw up (vomit) while drinking the bowel prep, take a break for up to 60 minutes. Then begin the bowel prep again. If you keep throwing up and you cannot take the bowel prep without throwing up, call your doctor.    General instructions  · Ask your doctor about:  ? Changing or  stopping your normal medicines. This is important if you take iron pills, diabetes medicines, or blood thinners.  ? Taking medicines such as aspirin and ibuprofen. These medicines can thin your blood. Do not take these medicines unless your doctor tells you to take them.  · Plan to have someone take you home from the hospital or clinic.  What happens during the procedure?  · An IV tube may be put into one of your veins.  · You will be given medicine to help you relax (sedative).  · To reduce your risk of infection:  ? Your doctors will wash their hands.  ? Your anal area will be washed with soap.  · You will be asked to lie on your side with your knees bent.  · Your doctor will get a long, thin, flexible tube ready. The tube will have a camera and a light on the end.  · The tube will be put into your anus.  · The tube will be gently put into your large intestine.  · Air will be delivered into your large intestine to keep it open. You may feel some pressure or cramping.  · The camera will be used to take photos.  · A small tissue sample may be removed for testing (biopsy).  · If small growths are found, your doctor may remove them and have them checked for cancer.  · The tube that was put into your anus will be slowly removed.  The procedure may vary among doctors and hospitals.  What happens after the procedure?  · Your doctor will check on you often until the medicines you were given have worn off.  · Do not drive for 24 hours after the procedure.  · You may have a small amount of blood in your poop.  · You may pass gas.  · You may have mild cramps or bloating in your belly (abdomen).  · It is up to you to get the results of your procedure. Ask your doctor, or the department performing the procedure, when your results will be ready.  Summary  · A colonoscopy is an exam to look at the large intestine.  · Follow instructions from your doctor about eating and drinking before the procedure.  · If you were prescribed an  oral bowel prep to clean out your colon, take it as told by your doctor.  · Your doctor will check on you often until the medicines you were given have worn off.  · Plan to have someone take you home from the hospital or clinic.  This information is not intended to replace advice given to you by your health care provider. Make sure you discuss any questions you have with your health care provider.  Document Released: 01/20/2012 Document Revised: 10/17/2018 Document Reviewed: 02/28/2017  [employer] Interactive Patient Education © 2019 [employer]  BMI for Adults  Body mass index (BMI) is a number that is calculated from a person's weight and height. In most adults, the number is used to find how much of an adult's weight is made up of fat. BMI is not as accurate as a direct measure of body fat.  How is BMI calculated?  BMI is calculated by dividing weight in kilograms by height in meters squared. It can also be calculated by dividing weight in pounds by height in inches squared, then multiplying the resulting number by 703. Charts are available to help you find your BMI quickly and easily without doing this calculation.  How is BMI interpreted?  Health care professionals use BMI charts to identify whether an adult is underweight, at a normal weight, or overweight based on the following guidelines:  · Underweight: BMI less than 18.5.  · Normal weight: BMI between 18.5 and 24.9.  · Overweight: BMI between 25 and 29.9.  · Obese: BMI of 30 and above.    BMI is usually interpreted the same for males and females.  Weight includes both fat and muscle, so someone with a muscular build, such as an athlete, may have a BMI that is higher than 24.9. In cases like these, BMI may not accurately depict body fat. To determine if excess body fat is the cause of a BMI of 25 or higher, further assessments may need to be done by a health care provider.  Why is BMI a useful tool?  BMI is used to identify a possible weight problem that  may be related to a medical problem or may increase the risk for medical problems. BMI can also be used to promote changes to reach a healthy weight.  This information is not intended to replace advice given to you by your health care provider. Make sure you discuss any questions you have with your health care provider.  Document Released: 08/29/2005 Document Revised: 04/27/2017 Document Reviewed: 05/15/2015  [employer] Interactive Patient Education © 2018 [employer]

## 2019-06-12 NOTE — PROGRESS NOTES
Chief Complaint   Patient presents with   • Colonoscopy Consultation       Subjective    Sharon Esposito is a 73 y.o. female. she is here today for follow-up.    History of Present Illness  73-year-old female presents to discuss screening colonoscopy.  Denies any abdominal pain, nausea, vomiting or change within her bowel habits.  Denies any melena or hematochezia.  Weight has remained stable.  Had normal screening colonoscopy 10 years ago unable to review that report.  Denies any family history of colorectal cancer.  Plan; schedule patient for screening colonoscopy.       The following portions of the patient's history were reviewed and updated as appropriate:   Past Medical History:   Diagnosis Date   • Acquired hypothyroidism     with hashimoto's thyroiditis      • Acute bronchitis    • Acute sinusitis    • Allergic rhinitis    • Bilateral hand pain    • Cough    • Diverticulitis of colon    • Edema     idiopathic state, history of      • Encounter for gynecological examination (general) (routine) without abnormal findings    • Encounter for screening for osteoporosis    • Essential hypertension    • Hemorrhoids     internal & external      • Hx of bone density study     DEXA BONE DENSITY APPENDICULAR: osteopenia, 12/18/2013   • Hx of screening mammography     MAMMOGRAM SCREENING: x2, 02/26/2014   • Hyperlipidemia     with elevated low density lipoprotein   • 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 Small internal & external hemorrhoids were present. Colonoscopy otherwise normal   • INJECTION OF MEDICATION  2015    Celestone (betamethasone) x2   • INJECTION OF MEDICATION  2014    Toradol    • PAP SMEAR  2009    Negative for intraepithelial lesion or malignancy   • SKIN BIOPSY  2010    L neck lesion- seborrheic keratosis. right infraorbital-seborrheic keratosis, right lateral orbital- intradermal nevus. forehead lesion-early squamous papilloma     Family History   Problem Relation Age of Onset   • Breast cancer Mother    • Cancer Mother 60        breast   • Diabetes Mother    • Heart disease Mother    • Hypertension Mother    • Coronary artery disease Father    • Breast cancer Other    • Coronary artery disease Other    • Heart disease Other      OB History      Para Term  AB Living    0 0 0 0 0 0    SAB TAB Ectopic Molar Multiple Live Births    0 0 0   0          Prior to Admission medications    Medication Sig Start Date End Date Taking? Authorizing Provider   atorvastatin (LIPITOR) 40 MG tablet Take 1 tablet by mouth Daily. 3/15/19  Yes Jeffry Irby MD   Blood Glucose Monitoring Suppl (ONE TOUCH ULTRA SYSTEM KIT) W/DEVICE kit Use for Home Glucose Monitoring 17  Yes Jeffry Irby MD   Cyanocobalamin (VITAMIN B-12) 1000 MCG sublingual tablet Place 1 tablet under your tongue daily for the rest of your life. 19  Yes Jeffry Irby MD   fluticasone (FLONASE) 50 MCG/ACT nasal spray 2 sprays into the nostril(s) as directed by provider Daily. 19  Yes Sonido Moyer MD   glucose blood (ONE TOUCH ULTRA TEST) test strip Test Blood Sugars Once daily 17  Yes Jeffry Irby MD   levothyroxine (SYNTHROID, LEVOTHROID) 88 MCG tablet Take 1 tablet by mouth Daily. 3/15/19  Yes Jeffry Irby MD   Loratadine 10 MG capsule Take  by mouth.   Yes Lesly Magana MD  "  losartan-hydrochlorothiazide (HYZAAR) 100-25 MG per tablet Take 1 tablet by mouth Daily. 3/15/19  Yes Jeffry Irby MD   metFORMIN ER (GLUCOPHAGE-XR) 500 MG 24 hr tablet Take 2 tablets by mouth Daily Before Supper. 2/20/19  Yes Jeffry Irby MD   metoprolol tartrate (LOPRESSOR) 25 MG tablet TAKE 1/2 TABLET BY MOUTH EVERY 12 (TWELVE) HOURS. 3/11/19  Yes Jeffry Irby MD   ONE TOUCH ULTRA TEST test strip TEST BLOOD SUGARS ONCE DAILY 4/25/18  Yes Jeffry Irby MD   ONETOUCH DELICA LANCETS FINE misc Test Blood Sugars Once Daily 5/22/17  Yes Jeffry Irby MD   ONETOUCH DELICA LANCETS FINE misc TEST BLOOD SUGARS ONCE DAILY 4/25/18  Yes Jeffry Irby MD   vitamin C (ASCORBIC ACID) 500 MG tablet Take 500 mg by mouth Daily.   Yes Provider, MD Lesly     Allergies   Allergen Reactions   • Codeine    • Latex    • Neosporin [Neomycin-Polymyxin-Gramicidin]      Social History     Socioeconomic History   • Marital status: Single     Spouse name: Not on file   • Number of children: Not on file   • Years of education: Not on file   • Highest education level: Not on file   Tobacco Use   • Smoking status: Never Smoker   • Smokeless tobacco: Never Used   Substance and Sexual Activity   • Alcohol use: No   • Drug use: No   • Sexual activity: Defer       Review of Systems  Review of Systems   Constitutional: Negative for activity change, appetite change, chills, diaphoresis, fatigue, fever and unexpected weight change.   HENT: Negative for sore throat and trouble swallowing.    Respiratory: Negative for shortness of breath.    Gastrointestinal: Negative for abdominal distention, abdominal pain, anal bleeding, blood in stool, constipation, diarrhea, nausea, rectal pain and vomiting.   Musculoskeletal: Negative for arthralgias.   Skin: Negative for pallor.   Neurological: Negative for light-headedness.        /86 Comment: Patient states she has been walking for exercise.  Pulse 84   Ht 162.6 cm (64\") "   Wt 89.4 kg (197 lb)   LMP  (LMP Unknown)   SpO2 96%   BMI 33.81 kg/m²     Objective    Physical Exam   Constitutional: She is oriented to person, place, and time. She appears well-developed and well-nourished. She is cooperative. No distress.   HENT:   Head: Normocephalic and atraumatic.   Neck: Normal range of motion. Neck supple. No thyromegaly present.   Cardiovascular: Normal rate, regular rhythm and normal heart sounds.   Pulmonary/Chest: Effort normal and breath sounds normal. She has no wheezes. She has no rhonchi. She has no rales.   Abdominal: Soft. Normal appearance and bowel sounds are normal. She exhibits no distension. There is no hepatosplenomegaly. There is no tenderness. There is no rigidity and no guarding. No hernia.   Lymphadenopathy:     She has no cervical adenopathy.   Neurological: She is alert and oriented to person, place, and time.   Skin: Skin is warm, dry and intact. No rash noted. No pallor.   Psychiatric: She has a normal mood and affect. Her speech is normal.     Office Visit on 05/29/2019   Component Date Value Ref Range Status   • Hemoglobin A1C 05/29/2019 6.21* 4.80 - 5.60 % Final   • Microalbumin/Creatinine Ratio 05/29/2019   mg/g Final    Unable to calculate   • Creatinine, Urine 05/29/2019 36.3  mg/dL Final   • Microalbumin, Urine 05/29/2019 <1.2  mg/L Final   • Glucose 05/29/2019 109* 65 - 99 mg/dL Final   • BUN 05/29/2019 12  8 - 23 mg/dL Final   • Creatinine 05/29/2019 0.78  0.57 - 1.00 mg/dL Final   • Sodium 05/29/2019 140  136 - 145 mmol/L Final   • Potassium 05/29/2019 4.1  3.5 - 5.2 mmol/L Final   • Chloride 05/29/2019 101  98 - 107 mmol/L Final   • CO2 05/29/2019 23.9  22.0 - 29.0 mmol/L Final   • Calcium 05/29/2019 9.1  8.6 - 10.5 mg/dL Final   • Total Protein 05/29/2019 7.2  6.0 - 8.5 g/dL Final   • Albumin 05/29/2019 4.40  3.50 - 5.20 g/dL Final   • ALT (SGPT) 05/29/2019 16  1 - 33 U/L Final   • AST (SGOT) 05/29/2019 17  1 - 32 U/L Final   • Alkaline Phosphatase  05/29/2019 97  39 - 117 U/L Final   • Total Bilirubin 05/29/2019 0.4  0.2 - 1.2 mg/dL Final   • eGFR Non African Amer 05/29/2019 73  >60 mL/min/1.73 Final   • Globulin 05/29/2019 2.8  gm/dL Final   • A/G Ratio 05/29/2019 1.6  g/dL Final   • BUN/Creatinine Ratio 05/29/2019 15.4  7.0 - 25.0 Final   • Anion Gap 05/29/2019 15.1  mmol/L Final   • Total Cholesterol 05/29/2019 200  0 - 200 mg/dL Final   • Triglycerides 05/29/2019 165* 0 - 150 mg/dL Final   • HDL Cholesterol 05/29/2019 74* 40 - 60 mg/dL Final   • LDL Cholesterol  05/29/2019 93  0 - 100 mg/dL Final   • VLDL Cholesterol 05/29/2019 33  5 - 40 mg/dL Final   • LDL/HDL Ratio 05/29/2019 1.26   Final   • Vitamin B-12 05/29/2019 1,000* 211 - 946 pg/mL Final   • Free T4 05/29/2019 1.42  0.93 - 1.70 ng/dL Final   • TSH 05/29/2019 1.450  0.270 - 4.200 mIU/mL Final     Assessment/Plan      1. Encounter for screening for malignant neoplasm of colon    .       Orders placed during this encounter include:  Orders Placed This Encounter   Procedures   • Follow Anesthesia Guidelines / Standing Orders     Standing Status:   Future   • Obtain Informed Consent     Standing Status:   Future     Order Specific Question:   Informed Consent Given For     Answer:   COLONOSCOPY       COLONOSCOPY Monday or Wed (N/A)    Review and/or summary of lab tests, radiology, procedures, medications. Review and summary of old records and obtaining of history. The risks and benefits of my recommendations, as well as other treatment options were discussed with the patient today. Questions were answered.    New Medications Ordered This Visit   Medications   • sodium-potassium-magnesium sulfates (SUPREP BOWEL PREP KIT) 17.5-3.13-1.6 GM/177ML solution oral solution     Sig: Take 1 bottle by mouth Every 12 (Twelve) Hours.     Dispense:  2 bottle     Refill:  0       Follow-up: Return in about 4 weeks (around 7/10/2019).          This document has been electronically signed by LEXI Chwala on June  12, 2019 11:35 AM             Results for orders placed or performed in visit on 05/29/19   Microalbumin / Creatinine Urine Ratio - Urine, Clean Catch   Result Value Ref Range    Microalbumin/Creatinine Ratio  mg/g    Creatinine, Urine 36.3 mg/dL    Microalbumin, Urine <1.2 mg/L   TSH   Result Value Ref Range    TSH 1.450 0.270 - 4.200 mIU/mL   T4, Free   Result Value Ref Range    Free T4 1.42 0.93 - 1.70 ng/dL   Hemoglobin A1c   Result Value Ref Range    Hemoglobin A1C 6.21 (H) 4.80 - 5.60 %   Vitamin B12   Result Value Ref Range    Vitamin B-12 1,000 (H) 211 - 946 pg/mL   Lipid Panel   Result Value Ref Range    Total Cholesterol 200 0 - 200 mg/dL    Triglycerides 165 (H) 0 - 150 mg/dL    HDL Cholesterol 74 (H) 40 - 60 mg/dL    LDL Cholesterol  93 0 - 100 mg/dL    VLDL Cholesterol 33 5 - 40 mg/dL    LDL/HDL Ratio 1.26    Comprehensive Metabolic Panel   Result Value Ref Range    Glucose 109 (H) 65 - 99 mg/dL    BUN 12 8 - 23 mg/dL    Creatinine 0.78 0.57 - 1.00 mg/dL    Sodium 140 136 - 145 mmol/L    Potassium 4.1 3.5 - 5.2 mmol/L    Chloride 101 98 - 107 mmol/L    CO2 23.9 22.0 - 29.0 mmol/L    Calcium 9.1 8.6 - 10.5 mg/dL    Total Protein 7.2 6.0 - 8.5 g/dL    Albumin 4.40 3.50 - 5.20 g/dL    ALT (SGPT) 16 1 - 33 U/L    AST (SGOT) 17 1 - 32 U/L    Alkaline Phosphatase 97 39 - 117 U/L    Total Bilirubin 0.4 0.2 - 1.2 mg/dL    eGFR Non African Amer 73 >60 mL/min/1.73    Globulin 2.8 gm/dL    A/G Ratio 1.6 g/dL    BUN/Creatinine Ratio 15.4 7.0 - 25.0    Anion Gap 15.1 mmol/L   Results for orders placed or performed in visit on 11/28/18   CBC Auto Differential   Result Value Ref Range    WBC 9.61 3.20 - 9.80 10*3/mm3    RBC 4.90 3.77 - 5.16 10*6/mm3    Hemoglobin 13.7 12.0 - 15.5 g/dL    Hematocrit 42.2 35.0 - 45.0 %    MCV 86.1 80.0 - 98.0 fL    MCH 28.0 26.5 - 34.0 pg    MCHC 32.5 31.4 - 36.0 g/dL    RDW 15.1 (H) 11.5 - 14.5 %    RDW-SD 47.8 (H) 36.4 - 46.3 fl    MPV 9.4 8.0 - 12.0 fL    Platelets 346 150 - 450  10*3/mm3    Neutrophil % 58.9 37.0 - 80.0 %    Lymphocyte % 29.7 10.0 - 50.0 %    Monocyte % 7.0 0.0 - 12.0 %    Eosinophil % 3.9 0.0 - 7.0 %    Basophil % 0.3 0.0 - 2.0 %    Immature Grans % 0.2 0.0 - 0.5 %    Neutrophils, Absolute 5.67 2.00 - 8.60 10*3/mm3    Lymphocytes, Absolute 2.85 0.60 - 4.20 10*3/mm3    Monocytes, Absolute 0.67 0.00 - 0.90 10*3/mm3    Eosinophils, Absolute 0.37 0.00 - 0.70 10*3/mm3    Basophils, Absolute 0.03 0.00 - 0.20 10*3/mm3    Immature Grans, Absolute 0.02 0.00 - 0.02 10*3/mm3   Microalbumin / Creatinine Urine Ratio - Urine, Clean Catch   Result Value Ref Range    Microalbumin/Creatinine Ratio  0.0 - 30.0 mg/g    Creatinine, Urine 41.4 mg/dL    Microalbumin, Urine <0.6 mg/L   TSH   Result Value Ref Range    TSH 2.090 0.460 - 4.680 mIU/mL   T4, Free   Result Value Ref Range    Free T4 1.32 0.78 - 2.19 ng/dL   Hemoglobin A1c   Result Value Ref Range    Hemoglobin A1C 6.4 (H) 4 - 5.6 %   Vitamin B12   Result Value Ref Range    Vitamin B-12 222 (L) 239 - 931 pg/mL   Lipid Panel   Result Value Ref Range    Total Cholesterol 181 0 - 199 mg/dL    Triglycerides 200 (H) 20 - 199 mg/dL    HDL Cholesterol 59 (L) 60 - 200 mg/dL    LDL Cholesterol  88 1 - 129 mg/dL    LDL/HDL Ratio 1.39 0.00 - 3.22   Comprehensive Metabolic Panel   Result Value Ref Range    Glucose 114 (H) 60 - 100 mg/dL    BUN 14 7 - 21 mg/dL    Creatinine 0.82 0.50 - 1.00 mg/dL    Sodium 139 137 - 145 mmol/L    Potassium 3.8 3.5 - 5.1 mmol/L    Chloride 96 95 - 110 mmol/L    CO2 30.0 22.0 - 31.0 mmol/L    Calcium 9.7 8.4 - 10.2 mg/dL    Total Protein 8.0 6.3 - 8.6 g/dL    Albumin 4.70 3.40 - 4.80 g/dL    ALT (SGPT) 21 9 - 52 U/L    AST (SGOT) 33 14 - 36 U/L    Alkaline Phosphatase 116 38 - 126 U/L    Total Bilirubin 0.9 0.2 - 1.3 mg/dL    eGFR Non African Amer 69 39 - 90 mL/min/1.73    Globulin 3.3 2.3 - 3.5 gm/dL    A/G Ratio 1.4 1.1 - 1.8 g/dL    BUN/Creatinine Ratio 17.1 7.0 - 25.0    Anion Gap 13.0 5.0 - 15.0 mmol/L   Results  for orders placed or performed in visit on 05/23/18   CBC Auto Differential   Result Value Ref Range    WBC 10.01 (H) 3.20 - 9.80 10*3/mm3    RBC 4.91 3.77 - 5.16 10*6/mm3    Hemoglobin 13.8 12.0 - 15.5 g/dL    Hematocrit 42.2 35.0 - 45.0 %    MCV 85.9 80.0 - 98.0 fL    MCH 28.1 26.5 - 34.0 pg    MCHC 32.7 31.4 - 36.0 g/dL    RDW 15.2 (H) 11.5 - 14.5 %    RDW-SD 48.1 (H) 36.4 - 46.3 fl    MPV 9.6 8.0 - 12.0 fL    Platelets 345 150 - 450 10*3/mm3    Neutrophil % 48.8 37.0 - 80.0 %    Lymphocyte % 38.5 10.0 - 50.0 %    Monocyte % 6.9 0.0 - 12.0 %    Eosinophil % 5.0 0.0 - 7.0 %    Basophil % 0.5 0.0 - 2.0 %    Immature Grans % 0.3 0.0 - 0.5 %    Neutrophils, Absolute 4.89 2.00 - 8.60 10*3/mm3    Lymphocytes, Absolute 3.85 0.60 - 4.20 10*3/mm3    Monocytes, Absolute 0.69 0.00 - 0.90 10*3/mm3    Eosinophils, Absolute 0.50 0.00 - 0.70 10*3/mm3    Basophils, Absolute 0.05 0.00 - 0.20 10*3/mm3    Immature Grans, Absolute 0.03 (H) 0.00 - 0.02 10*3/mm3     *Note: Due to a large number of results and/or encounters for the requested time period, some results have not been displayed. A complete set of results can be found in Results Review.

## 2019-07-19 DIAGNOSIS — E11.9 DIABETES MELLITUS WITHOUT COMPLICATION (HCC): ICD-10-CM

## 2019-07-19 RX ORDER — LANCETS 33 GAUGE
EACH MISCELLANEOUS
Qty: 100 EACH | Refills: 3 | Status: SHIPPED | OUTPATIENT
Start: 2019-07-19 | End: 2020-01-03

## 2019-08-14 ENCOUNTER — ANESTHESIA EVENT (OUTPATIENT)
Dept: GASTROENTEROLOGY | Facility: HOSPITAL | Age: 74
End: 2019-08-14

## 2019-08-14 ENCOUNTER — HOSPITAL ENCOUNTER (OUTPATIENT)
Facility: HOSPITAL | Age: 74
Setting detail: HOSPITAL OUTPATIENT SURGERY
Discharge: HOME | End: 2019-08-14
Attending: INTERNAL MEDICINE | Admitting: INTERNAL MEDICINE

## 2019-08-14 ENCOUNTER — ANESTHESIA (OUTPATIENT)
Dept: GASTROENTEROLOGY | Facility: HOSPITAL | Age: 74
End: 2019-08-14

## 2019-08-14 VITALS
HEIGHT: 64 IN | WEIGHT: 195.77 LBS | HEART RATE: 68 BPM | RESPIRATION RATE: 16 BRPM | TEMPERATURE: 97.2 F | OXYGEN SATURATION: 95 % | BODY MASS INDEX: 33.42 KG/M2 | SYSTOLIC BLOOD PRESSURE: 132 MMHG | DIASTOLIC BLOOD PRESSURE: 60 MMHG

## 2019-08-14 DIAGNOSIS — Z12.11 ENCOUNTER FOR SCREENING FOR MALIGNANT NEOPLASM OF COLON: ICD-10-CM

## 2019-08-14 PROCEDURE — 88305 TISSUE EXAM BY PATHOLOGIST: CPT | Performed by: PATHOLOGY

## 2019-08-14 PROCEDURE — 45381 COLONOSCOPY SUBMUCOUS NJX: CPT | Performed by: INTERNAL MEDICINE

## 2019-08-14 PROCEDURE — 45385 COLONOSCOPY W/LESION REMOVAL: CPT | Performed by: INTERNAL MEDICINE

## 2019-08-14 PROCEDURE — 25010000002 PROPOFOL 10 MG/ML EMULSION: Performed by: NURSE ANESTHETIST, CERTIFIED REGISTERED

## 2019-08-14 PROCEDURE — 88305 TISSUE EXAM BY PATHOLOGIST: CPT | Performed by: INTERNAL MEDICINE

## 2019-08-14 RX ORDER — DEXTROSE AND SODIUM CHLORIDE 5; .45 G/100ML; G/100ML
30 INJECTION, SOLUTION INTRAVENOUS CONTINUOUS PRN
Status: DISCONTINUED | OUTPATIENT
Start: 2019-08-14 | End: 2019-08-14 | Stop reason: HOSPADM

## 2019-08-14 RX ORDER — PROPOFOL 10 MG/ML
VIAL (ML) INTRAVENOUS AS NEEDED
Status: DISCONTINUED | OUTPATIENT
Start: 2019-08-14 | End: 2019-08-14 | Stop reason: SURG

## 2019-08-14 RX ADMIN — PROPOFOL 30 MG: 10 INJECTION, EMULSION INTRAVENOUS at 10:06

## 2019-08-14 RX ADMIN — DEXTROSE AND SODIUM CHLORIDE 30 ML/HR: 5; 450 INJECTION, SOLUTION INTRAVENOUS at 08:56

## 2019-08-14 RX ADMIN — PROPOFOL 60 MG: 10 INJECTION, EMULSION INTRAVENOUS at 09:46

## 2019-08-14 RX ADMIN — PROPOFOL 30 MG: 10 INJECTION, EMULSION INTRAVENOUS at 09:54

## 2019-08-14 RX ADMIN — PROPOFOL 20 MG: 10 INJECTION, EMULSION INTRAVENOUS at 09:48

## 2019-08-14 RX ADMIN — PROPOFOL 20 MG: 10 INJECTION, EMULSION INTRAVENOUS at 09:51

## 2019-08-14 RX ADMIN — PROPOFOL 30 MG: 10 INJECTION, EMULSION INTRAVENOUS at 09:57

## 2019-08-14 RX ADMIN — PROPOFOL 40 MG: 10 INJECTION, EMULSION INTRAVENOUS at 10:02

## 2019-08-14 NOTE — H&P
No chief complaint on file.      Subjective    Sharon Esposito is a 73 y.o. female. she is here today for follow-up.    History of Present Illness  73-year-old female presents to discuss screening colonoscopy.  Denies any abdominal pain, nausea, vomiting or change within her bowel habits.  Denies any melena or hematochezia.  Weight has remained stable.  Had normal screening colonoscopy 10 years ago unable to review that report.  Denies any family history of colorectal cancer.  Plan; schedule patient for screening colonoscopy.       The following portions of the patient's history were reviewed and updated as appropriate:   Past Medical History:   Diagnosis Date   • Acquired hypothyroidism     with hashimoto's thyroiditis      • Acute bronchitis    • Acute sinusitis    • Allergic rhinitis    • Bilateral hand pain    • Cough    • Diabetes mellitus (CMS/HCC)    • Diverticulitis of colon    • Edema     idiopathic state, history of      • Encounter for gynecological examination (general) (routine) without abnormal findings    • Encounter for screening for osteoporosis    • Essential hypertension    • Hemorrhoids     internal & external      • Hx of bone density study     DEXA BONE DENSITY APPENDICULAR: osteopenia, 12/18/2013   • Hx of screening mammography     MAMMOGRAM SCREENING: x2, 02/26/2014   • Hyperlipidemia     with elevated low density lipoprotein   • 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 Small internal & external hemorrhoids were present. Colonoscopy otherwise normal   • INJECTION OF MEDICATION  2015    Celestone (betamethasone) x2   • INJECTION OF MEDICATION  2014    Toradol    • PAP SMEAR  2009    Negative for intraepithelial lesion or malignancy   • SKIN BIOPSY  2010    L neck lesion- seborrheic keratosis. right infraorbital-seborrheic keratosis, right lateral orbital- intradermal nevus. forehead lesion-early squamous papilloma     Family History   Problem Relation Age of Onset   • Breast cancer Mother    • Cancer Mother 60        Breast   • Diabetes Mother    • Heart disease Mother    • Hypertension Mother    • Coronary artery disease Father    • Breast cancer Other    • Coronary artery disease Other    • Heart disease Other      OB History      Para Term  AB Living    0 0 0 0 0 0    SAB TAB Ectopic Molar Multiple Live Births    0 0 0   0          Prior to Admission medications    Medication Sig Start Date End Date Taking? Authorizing Provider   atorvastatin (LIPITOR) 40 MG tablet Take 1 tablet by mouth Daily. 3/15/19  Yes Jeffry Irby MD   Blood Glucose Monitoring Suppl (ONE TOUCH ULTRA SYSTEM KIT) W/DEVICE kit Use for Home Glucose Monitoring 17  Yes Jeffyr Irby MD   Cyanocobalamin (VITAMIN B-12) 1000 MCG sublingual tablet Place 1 tablet under your tongue daily for the rest of your life. 19  Yes Jeffry Irby MD   fluticasone (FLONASE) 50 MCG/ACT nasal spray 2 sprays into the nostril(s) as directed by provider Daily. 19  Yes Sonido Moyer MD   glucose blood (ONE TOUCH ULTRA TEST) test strip Test Blood Sugars Once daily 17  Yes Jeffry Irby MD   levothyroxine (SYNTHROID, LEVOTHROID) 88 MCG tablet Take 1 tablet by mouth Daily. 3/15/19  Yes Jeffry Irby MD   Loratadine 10 MG capsule Take  by mouth.   Yes  Provider, MD Lesly   losartan-hydrochlorothiazide (HYZAAR) 100-25 MG per tablet Take 1 tablet by mouth Daily. 3/15/19  Yes Jeffry Irby MD   metFORMIN ER (GLUCOPHAGE-XR) 500 MG 24 hr tablet Take 2 tablets by mouth Daily Before Supper. 2/20/19  Yes Jeffry Irby MD   metoprolol tartrate (LOPRESSOR) 25 MG tablet TAKE 1/2 TABLET BY MOUTH EVERY 12 (TWELVE) HOURS. 3/11/19  Yes Jeffry Irby MD   ONE TOUCH ULTRA TEST test strip TEST BLOOD SUGARS ONCE DAILY 4/25/18  Yes Jeffry Irby MD   ONETOUCH DELICA LANCETS FINE misc Test Blood Sugars Once Daily 5/22/17  Yes Jeffry Irby MD   ONETOUCH DELICA LANCETS FINE misc TEST BLOOD SUGARS ONCE DAILY 4/25/18  Yes Jeffry Irby MD   vitamin C (ASCORBIC ACID) 500 MG tablet Take 500 mg by mouth Daily.   Yes ProviderLesly MD     Allergies   Allergen Reactions   • Codeine GI Intolerance   • Latex Rash   • Neosporin [Neomycin-Polymyxin-Gramicidin] Rash     Social History     Socioeconomic History   • Marital status: Single     Spouse name: Not on file   • Number of children: Not on file   • Years of education: Not on file   • Highest education level: Not on file   Tobacco Use   • Smoking status: Never Smoker   • Smokeless tobacco: Never Used   Substance and Sexual Activity   • Alcohol use: No   • Drug use: No   • Sexual activity: Defer       Review of Systems  Review of Systems   Constitutional: Negative for activity change, appetite change, chills, diaphoresis, fatigue, fever and unexpected weight change.   HENT: Negative for sore throat and trouble swallowing.    Respiratory: Negative for shortness of breath.    Gastrointestinal: Negative for abdominal distention, abdominal pain, anal bleeding, blood in stool, constipation, diarrhea, nausea, rectal pain and vomiting.   Musculoskeletal: Negative for arthralgias.   Skin: Negative for pallor.   Neurological: Negative for light-headedness.        BP (!) 204/71   Pulse 73   Temp 97.2 °F (36.2 °C)   " Resp 16   Ht 162.6 cm (64\")   Wt 88.8 kg (195 lb 12.3 oz)   LMP  (LMP Unknown)   SpO2 97%   BMI 33.60 kg/m²     Objective    Physical Exam   Constitutional: She is oriented to person, place, and time. She appears well-developed and well-nourished. She is cooperative. No distress.   HENT:   Head: Normocephalic and atraumatic.   Neck: Normal range of motion. Neck supple. No thyromegaly present.   Cardiovascular: Normal rate, regular rhythm and normal heart sounds.   Pulmonary/Chest: Effort normal and breath sounds normal. She has no wheezes. She has no rhonchi. She has no rales.   Abdominal: Soft. Normal appearance and bowel sounds are normal. She exhibits no distension. There is no hepatosplenomegaly. There is no tenderness. There is no rigidity and no guarding. No hernia.   Lymphadenopathy:     She has no cervical adenopathy.   Neurological: She is alert and oriented to person, place, and time.   Skin: Skin is warm, dry and intact. No rash noted. No pallor.   Psychiatric: She has a normal mood and affect. Her speech is normal.     Office Visit on 05/29/2019   Component Date Value Ref Range Status   • Hemoglobin A1C 05/29/2019 6.21* 4.80 - 5.60 % Final   • Microalbumin/Creatinine Ratio 05/29/2019   mg/g Final    Unable to calculate   • Creatinine, Urine 05/29/2019 36.3  mg/dL Final   • Microalbumin, Urine 05/29/2019 <1.2  mg/L Final   • Glucose 05/29/2019 109* 65 - 99 mg/dL Final   • BUN 05/29/2019 12  8 - 23 mg/dL Final   • Creatinine 05/29/2019 0.78  0.57 - 1.00 mg/dL Final   • Sodium 05/29/2019 140  136 - 145 mmol/L Final   • Potassium 05/29/2019 4.1  3.5 - 5.2 mmol/L Final   • Chloride 05/29/2019 101  98 - 107 mmol/L Final   • CO2 05/29/2019 23.9  22.0 - 29.0 mmol/L Final   • Calcium 05/29/2019 9.1  8.6 - 10.5 mg/dL Final   • Total Protein 05/29/2019 7.2  6.0 - 8.5 g/dL Final   • Albumin 05/29/2019 4.40  3.50 - 5.20 g/dL Final   • ALT (SGPT) 05/29/2019 16  1 - 33 U/L Final   • AST (SGOT) 05/29/2019 17  1 - " 32 U/L Final   • Alkaline Phosphatase 05/29/2019 97  39 - 117 U/L Final   • Total Bilirubin 05/29/2019 0.4  0.2 - 1.2 mg/dL Final   • eGFR Non African Amer 05/29/2019 73  >60 mL/min/1.73 Final   • Globulin 05/29/2019 2.8  gm/dL Final   • A/G Ratio 05/29/2019 1.6  g/dL Final   • BUN/Creatinine Ratio 05/29/2019 15.4  7.0 - 25.0 Final   • Anion Gap 05/29/2019 15.1  mmol/L Final   • Total Cholesterol 05/29/2019 200  0 - 200 mg/dL Final   • Triglycerides 05/29/2019 165* 0 - 150 mg/dL Final   • HDL Cholesterol 05/29/2019 74* 40 - 60 mg/dL Final   • LDL Cholesterol  05/29/2019 93  0 - 100 mg/dL Final   • VLDL Cholesterol 05/29/2019 33  5 - 40 mg/dL Final   • LDL/HDL Ratio 05/29/2019 1.26   Final   • Vitamin B-12 05/29/2019 1,000* 211 - 946 pg/mL Final   • Free T4 05/29/2019 1.42  0.93 - 1.70 ng/dL Final   • TSH 05/29/2019 1.450  0.270 - 4.200 mIU/mL Final     Assessment/Plan      1. Encounter for screening for malignant neoplasm of colon    .       Orders placed during this encounter include:  Orders Placed This Encounter   Procedures   • Obtain Informed Consent     Standing Status:   Standing     Number of Occurrences:   1     Order Specific Question:   Informed Consent Given For     Answer:   Colonoscopy   • Notify Anesthesia of Any Acute Changes in Patient Condition     Standing Status:   Standing     Number of Occurrences:   1     Order Specific Question:   Other     Answer:   Any Acute Changes in Patient Condition   • Notify Anesthesia for Unrelieved Pain     Standing Status:   Standing     Number of Occurrences:   1     Order Specific Question:   Other     Answer:   Unrelieved Pain   • Discharge patient from PACU when discharge criteria is met.     Standing Status:   Standing     Number of Occurrences:   1   • POC Glucose Once     Prior to Procedure on ALL Diabetic Patients     Standing Status:   Standing     Number of Occurrences:   1   • Insert Peripheral IV     Standing Status:   Standing     Number of  Occurrences:   1       COLONOSCOPY Monday or Wed (N/A)    Review and/or summary of lab tests, radiology, procedures, medications. Review and summary of old records and obtaining of history. The risks and benefits of my recommendations, as well as other treatment options were discussed with the patient today. Questions were answered.    New Medications Ordered This Visit   Medications   • dextrose 5 % and sodium chloride 0.45 % infusion       Follow-up: No Follow-up on file.          This document has been electronically signed by Tony Curiel MD on August 14, 2019 9:05 AM             Results for orders placed or performed in visit on 05/29/19   Microalbumin / Creatinine Urine Ratio - Urine, Clean Catch   Result Value Ref Range    Microalbumin/Creatinine Ratio  mg/g    Creatinine, Urine 36.3 mg/dL    Microalbumin, Urine <1.2 mg/L   TSH   Result Value Ref Range    TSH 1.450 0.270 - 4.200 mIU/mL   T4, Free   Result Value Ref Range    Free T4 1.42 0.93 - 1.70 ng/dL   Hemoglobin A1c   Result Value Ref Range    Hemoglobin A1C 6.21 (H) 4.80 - 5.60 %   Vitamin B12   Result Value Ref Range    Vitamin B-12 1,000 (H) 211 - 946 pg/mL   Lipid Panel   Result Value Ref Range    Total Cholesterol 200 0 - 200 mg/dL    Triglycerides 165 (H) 0 - 150 mg/dL    HDL Cholesterol 74 (H) 40 - 60 mg/dL    LDL Cholesterol  93 0 - 100 mg/dL    VLDL Cholesterol 33 5 - 40 mg/dL    LDL/HDL Ratio 1.26    Comprehensive Metabolic Panel   Result Value Ref Range    Glucose 109 (H) 65 - 99 mg/dL    BUN 12 8 - 23 mg/dL    Creatinine 0.78 0.57 - 1.00 mg/dL    Sodium 140 136 - 145 mmol/L    Potassium 4.1 3.5 - 5.2 mmol/L    Chloride 101 98 - 107 mmol/L    CO2 23.9 22.0 - 29.0 mmol/L    Calcium 9.1 8.6 - 10.5 mg/dL    Total Protein 7.2 6.0 - 8.5 g/dL    Albumin 4.40 3.50 - 5.20 g/dL    ALT (SGPT) 16 1 - 33 U/L    AST (SGOT) 17 1 - 32 U/L    Alkaline Phosphatase 97 39 - 117 U/L    Total Bilirubin 0.4 0.2 - 1.2 mg/dL    eGFR Non  Amer 73 >60  mL/min/1.73    Globulin 2.8 gm/dL    A/G Ratio 1.6 g/dL    BUN/Creatinine Ratio 15.4 7.0 - 25.0    Anion Gap 15.1 mmol/L   Results for orders placed or performed in visit on 11/28/18   CBC Auto Differential   Result Value Ref Range    WBC 9.61 3.20 - 9.80 10*3/mm3    RBC 4.90 3.77 - 5.16 10*6/mm3    Hemoglobin 13.7 12.0 - 15.5 g/dL    Hematocrit 42.2 35.0 - 45.0 %    MCV 86.1 80.0 - 98.0 fL    MCH 28.0 26.5 - 34.0 pg    MCHC 32.5 31.4 - 36.0 g/dL    RDW 15.1 (H) 11.5 - 14.5 %    RDW-SD 47.8 (H) 36.4 - 46.3 fl    MPV 9.4 8.0 - 12.0 fL    Platelets 346 150 - 450 10*3/mm3    Neutrophil % 58.9 37.0 - 80.0 %    Lymphocyte % 29.7 10.0 - 50.0 %    Monocyte % 7.0 0.0 - 12.0 %    Eosinophil % 3.9 0.0 - 7.0 %    Basophil % 0.3 0.0 - 2.0 %    Immature Grans % 0.2 0.0 - 0.5 %    Neutrophils, Absolute 5.67 2.00 - 8.60 10*3/mm3    Lymphocytes, Absolute 2.85 0.60 - 4.20 10*3/mm3    Monocytes, Absolute 0.67 0.00 - 0.90 10*3/mm3    Eosinophils, Absolute 0.37 0.00 - 0.70 10*3/mm3    Basophils, Absolute 0.03 0.00 - 0.20 10*3/mm3    Immature Grans, Absolute 0.02 0.00 - 0.02 10*3/mm3   Microalbumin / Creatinine Urine Ratio - Urine, Clean Catch   Result Value Ref Range    Microalbumin/Creatinine Ratio  0.0 - 30.0 mg/g    Creatinine, Urine 41.4 mg/dL    Microalbumin, Urine <0.6 mg/L   TSH   Result Value Ref Range    TSH 2.090 0.460 - 4.680 mIU/mL   T4, Free   Result Value Ref Range    Free T4 1.32 0.78 - 2.19 ng/dL   Hemoglobin A1c   Result Value Ref Range    Hemoglobin A1C 6.4 (H) 4 - 5.6 %   Vitamin B12   Result Value Ref Range    Vitamin B-12 222 (L) 239 - 931 pg/mL   Lipid Panel   Result Value Ref Range    Total Cholesterol 181 0 - 199 mg/dL    Triglycerides 200 (H) 20 - 199 mg/dL    HDL Cholesterol 59 (L) 60 - 200 mg/dL    LDL Cholesterol  88 1 - 129 mg/dL    LDL/HDL Ratio 1.39 0.00 - 3.22   Comprehensive Metabolic Panel   Result Value Ref Range    Glucose 114 (H) 60 - 100 mg/dL    BUN 14 7 - 21 mg/dL    Creatinine 0.82 0.50 - 1.00  mg/dL    Sodium 139 137 - 145 mmol/L    Potassium 3.8 3.5 - 5.1 mmol/L    Chloride 96 95 - 110 mmol/L    CO2 30.0 22.0 - 31.0 mmol/L    Calcium 9.7 8.4 - 10.2 mg/dL    Total Protein 8.0 6.3 - 8.6 g/dL    Albumin 4.70 3.40 - 4.80 g/dL    ALT (SGPT) 21 9 - 52 U/L    AST (SGOT) 33 14 - 36 U/L    Alkaline Phosphatase 116 38 - 126 U/L    Total Bilirubin 0.9 0.2 - 1.3 mg/dL    eGFR Non African Amer 69 39 - 90 mL/min/1.73    Globulin 3.3 2.3 - 3.5 gm/dL    A/G Ratio 1.4 1.1 - 1.8 g/dL    BUN/Creatinine Ratio 17.1 7.0 - 25.0    Anion Gap 13.0 5.0 - 15.0 mmol/L   Results for orders placed or performed in visit on 05/23/18   CBC Auto Differential   Result Value Ref Range    WBC 10.01 (H) 3.20 - 9.80 10*3/mm3    RBC 4.91 3.77 - 5.16 10*6/mm3    Hemoglobin 13.8 12.0 - 15.5 g/dL    Hematocrit 42.2 35.0 - 45.0 %    MCV 85.9 80.0 - 98.0 fL    MCH 28.1 26.5 - 34.0 pg    MCHC 32.7 31.4 - 36.0 g/dL    RDW 15.2 (H) 11.5 - 14.5 %    RDW-SD 48.1 (H) 36.4 - 46.3 fl    MPV 9.6 8.0 - 12.0 fL    Platelets 345 150 - 450 10*3/mm3    Neutrophil % 48.8 37.0 - 80.0 %    Lymphocyte % 38.5 10.0 - 50.0 %    Monocyte % 6.9 0.0 - 12.0 %    Eosinophil % 5.0 0.0 - 7.0 %    Basophil % 0.5 0.0 - 2.0 %    Immature Grans % 0.3 0.0 - 0.5 %    Neutrophils, Absolute 4.89 2.00 - 8.60 10*3/mm3    Lymphocytes, Absolute 3.85 0.60 - 4.20 10*3/mm3    Monocytes, Absolute 0.69 0.00 - 0.90 10*3/mm3    Eosinophils, Absolute 0.50 0.00 - 0.70 10*3/mm3    Basophils, Absolute 0.05 0.00 - 0.20 10*3/mm3    Immature Grans, Absolute 0.03 (H) 0.00 - 0.02 10*3/mm3     *Note: Due to a large number of results and/or encounters for the requested time period, some results have not been displayed. A complete set of results can be found in Results Review.

## 2019-08-14 NOTE — ANESTHESIA PREPROCEDURE EVALUATION
Anesthesia Evaluation     Patient summary reviewed and Nursing notes reviewed   NPO Solid Status: > 8 hours  NPO Liquid Status: > 2 hours           Airway   Mallampati: II  TM distance: >3 FB  Neck ROM: full  No difficulty expected  Dental      Pulmonary - negative pulmonary ROS and normal exam   Cardiovascular     Rhythm: regular  Rate: normal    (+) hypertension well controlled 2 medications or greater, hyperlipidemia,       Neuro/Psych  GI/Hepatic/Renal/Endo      Musculoskeletal     Abdominal    Substance History      OB/GYN          Other                        Anesthesia Plan    ASA 3     MAC     intravenous induction   Anesthetic plan, all risks, benefits, and alternatives have been provided, discussed and informed consent has been obtained with: patient.    Plan discussed with CRNA.

## 2019-08-14 NOTE — ANESTHESIA POSTPROCEDURE EVALUATION
Patient: Sharon Esposito    Procedure Summary     Date:  08/14/19 Room / Location:  [facility] ENDOSCOPY 1 / [facility] ENDOSCOPY    Anesthesia Start:  0945 Anesthesia Stop:  1010    Procedure:  COLONOSCOPY Monday or Wed (N/A ) Diagnosis:       Encounter for screening for malignant neoplasm of colon      (Encounter for screening for malignant neoplasm of colon [Z12.11])    Surgeon:  Tony Curiel MD Provider:  Ann Marie Melchor CRNA    Anesthesia Type:  MAC ASA Status:  3          Anesthesia Type: MAC  Last vitals  BP   (!) 204/71 (08/14/19 0845)   Temp   97.2 °F (36.2 °C) (08/14/19 0845)   Pulse   73 (08/14/19 0845)   Resp   16 (08/14/19 0845)     SpO2   97 % (08/14/19 0845)     Post Anesthesia Care and Evaluation    Patient location during evaluation: bedside  Patient participation: complete - patient participated  Level of consciousness: sleepy but conscious  Pain score: 0  Pain management: adequate  Airway patency: patent  Anesthetic complications: No anesthetic complications  PONV Status: none  Cardiovascular status: acceptable  Respiratory status: acceptable  Hydration status: acceptable

## 2019-08-15 PROBLEM — D37.4 VILLOUS ADENOMA OF COLON: Status: ACTIVE | Noted: 2019-08-15

## 2019-08-15 LAB
LAB AP CASE REPORT: NORMAL
PATH REPORT.FINAL DX SPEC: NORMAL
PATH REPORT.GROSS SPEC: NORMAL

## 2019-08-21 ENCOUNTER — OFFICE VISIT (OUTPATIENT)
Dept: GASTROENTEROLOGY | Facility: CLINIC | Age: 74
End: 2019-08-21

## 2019-08-21 VITALS
HEIGHT: 64 IN | BODY MASS INDEX: 33.43 KG/M2 | WEIGHT: 195.8 LBS | HEART RATE: 68 BPM | DIASTOLIC BLOOD PRESSURE: 72 MMHG | SYSTOLIC BLOOD PRESSURE: 128 MMHG

## 2019-08-21 DIAGNOSIS — D37.4 VILLOUS ADENOMA OF COLON: Primary | ICD-10-CM

## 2019-08-21 PROCEDURE — 99213 OFFICE O/P EST LOW 20 MIN: CPT | Performed by: NURSE PRACTITIONER

## 2019-08-21 RX ORDER — DEXTROSE AND SODIUM CHLORIDE 5; .45 G/100ML; G/100ML
30 INJECTION, SOLUTION INTRAVENOUS CONTINUOUS PRN
Status: CANCELLED | OUTPATIENT
Start: 2019-11-13

## 2019-08-21 NOTE — PATIENT INSTRUCTIONS
Colon Polyps  Polyps are tissue growths inside the body. Polyps can grow in many places, including the large intestine (colon). A polyp may be a round bump or a mushroom-shaped growth. You could have one polyp or several.  Most colon polyps are noncancerous (benign). However, some colon polyps can become cancerous over time.  What are the causes?  The exact cause of colon polyps is not known.  What increases the risk?  This condition is more likely to develop in people who:  · Have a family history of colon cancer or colon polyps.  · Are older than 50 or older than 45 if they are .  · Have inflammatory bowel disease, such as ulcerative colitis or Crohn disease.  · Are overweight.  · Smoke cigarettes.  · Do not get enough exercise.  · Drink too much alcohol.  · Eat a diet that is:  ? High in fat and red meat.  ? Low in fiber.  · Had childhood cancer that was treated with abdominal radiation.    What are the signs or symptoms?  Most polyps do not cause symptoms. If you have symptoms, they may include:  · Blood coming from your rectum when having a bowel movement.  · Blood in your stool. The stool may look dark red or black.  · A change in bowel habits, such as constipation or diarrhea.    How is this diagnosed?  This condition is diagnosed with a colonoscopy. This is a procedure that uses a lighted, flexible scope to look at the inside of your colon.  How is this treated?  Treatment for this condition involves removing any polyps that are found. Those polyps will then be tested for cancer. If cancer is found, your health care provider will talk to you about options for colon cancer treatment.  Follow these instructions at home:  Diet  · Eat plenty of fiber, such as fruits, vegetables, and whole grains.  · Eat foods that are high in calcium and vitamin D, such as milk, cheese, yogurt, eggs, liver, fish, and broccoli.  · Limit foods high in fat, red meats, and processed meats, such as hot dogs, sausage,  hawk, and lunch meats.  · Maintain a healthy weight, or lose weight if recommended by your health care provider.  General instructions  · Do not smoke cigarettes.  · Do not drink alcohol excessively.  · Keep all follow-up visits as told by your health care provider. This is important. This includes keeping regularly scheduled colonoscopies. Talk to your health care provider about when you need a colonoscopy.  · Exercise every day or as told by your health care provider.  Contact a health care provider if:  · You have new or worsening bleeding during a bowel movement.  · You have new or increased blood in your stool.  · You have a change in bowel habits.  · You unexpectedly lose weight.  This information is not intended to replace advice given to you by your health care provider. Make sure you discuss any questions you have with your health care provider.  Document Released: 09/13/2005 Document Revised: 05/25/2017 Document Reviewed: 11/07/2016  [email or web address] Interactive Patient Education © 2019 [employer]

## 2019-11-13 ENCOUNTER — HOSPITAL ENCOUNTER (OUTPATIENT)
Facility: HOSPITAL | Age: 74
Setting detail: HOSPITAL OUTPATIENT SURGERY
Discharge: HOME | End: 2019-11-13
Attending: INTERNAL MEDICINE | Admitting: INTERNAL MEDICINE

## 2019-11-13 ENCOUNTER — ANESTHESIA (OUTPATIENT)
Dept: GASTROENTEROLOGY | Facility: HOSPITAL | Age: 74
End: 2019-11-13

## 2019-11-13 ENCOUNTER — ANESTHESIA EVENT (OUTPATIENT)
Dept: GASTROENTEROLOGY | Facility: HOSPITAL | Age: 74
End: 2019-11-13

## 2019-11-13 VITALS
TEMPERATURE: 97.5 F | HEIGHT: 64 IN | DIASTOLIC BLOOD PRESSURE: 55 MMHG | BODY MASS INDEX: 32.98 KG/M2 | HEART RATE: 78 BPM | RESPIRATION RATE: 16 BRPM | SYSTOLIC BLOOD PRESSURE: 119 MMHG | WEIGHT: 193.19 LBS | OXYGEN SATURATION: 94 %

## 2019-11-13 DIAGNOSIS — D37.4 VILLOUS ADENOMA OF COLON: ICD-10-CM

## 2019-11-13 PROCEDURE — 45380 COLONOSCOPY AND BIOPSY: CPT | Performed by: INTERNAL MEDICINE

## 2019-11-13 PROCEDURE — 88305 TISSUE EXAM BY PATHOLOGIST: CPT | Performed by: INTERNAL MEDICINE

## 2019-11-13 PROCEDURE — 25010000002 PROPOFOL 10 MG/ML EMULSION: Performed by: NURSE ANESTHETIST, CERTIFIED REGISTERED

## 2019-11-13 PROCEDURE — 88305 TISSUE EXAM BY PATHOLOGIST: CPT | Performed by: PATHOLOGY

## 2019-11-13 RX ORDER — PROMETHAZINE HYDROCHLORIDE 25 MG/ML
12.5 INJECTION, SOLUTION INTRAMUSCULAR; INTRAVENOUS ONCE AS NEEDED
Status: DISCONTINUED | OUTPATIENT
Start: 2019-11-13 | End: 2019-11-13 | Stop reason: HOSPADM

## 2019-11-13 RX ORDER — LIDOCAINE HYDROCHLORIDE 20 MG/ML
INJECTION, SOLUTION EPIDURAL; INFILTRATION; INTRACAUDAL; PERINEURAL AS NEEDED
Status: DISCONTINUED | OUTPATIENT
Start: 2019-11-13 | End: 2019-11-13 | Stop reason: SURG

## 2019-11-13 RX ORDER — ONDANSETRON 2 MG/ML
4 INJECTION INTRAMUSCULAR; INTRAVENOUS ONCE AS NEEDED
Status: DISCONTINUED | OUTPATIENT
Start: 2019-11-13 | End: 2019-11-13 | Stop reason: HOSPADM

## 2019-11-13 RX ORDER — PROMETHAZINE HYDROCHLORIDE 25 MG/1
25 TABLET ORAL ONCE AS NEEDED
Status: DISCONTINUED | OUTPATIENT
Start: 2019-11-13 | End: 2019-11-13 | Stop reason: HOSPADM

## 2019-11-13 RX ORDER — PROMETHAZINE HYDROCHLORIDE 25 MG/1
25 SUPPOSITORY RECTAL ONCE AS NEEDED
Status: DISCONTINUED | OUTPATIENT
Start: 2019-11-13 | End: 2019-11-13 | Stop reason: HOSPADM

## 2019-11-13 RX ORDER — SODIUM CHLORIDE, SODIUM GLUCONATE, SODIUM ACETATE, POTASSIUM CHLORIDE, AND MAGNESIUM CHLORIDE 526; 502; 368; 37; 30 MG/100ML; MG/100ML; MG/100ML; MG/100ML; MG/100ML
INJECTION, SOLUTION INTRAVENOUS CONTINUOUS PRN
Status: DISCONTINUED | OUTPATIENT
Start: 2019-11-13 | End: 2019-11-13 | Stop reason: SURG

## 2019-11-13 RX ORDER — PROPOFOL 10 MG/ML
VIAL (ML) INTRAVENOUS AS NEEDED
Status: DISCONTINUED | OUTPATIENT
Start: 2019-11-13 | End: 2019-11-13 | Stop reason: SURG

## 2019-11-13 RX ORDER — DEXTROSE AND SODIUM CHLORIDE 5; .45 G/100ML; G/100ML
30 INJECTION, SOLUTION INTRAVENOUS CONTINUOUS PRN
Status: DISCONTINUED | OUTPATIENT
Start: 2019-11-13 | End: 2019-11-13 | Stop reason: HOSPADM

## 2019-11-13 RX ADMIN — DEXTROSE AND SODIUM CHLORIDE 30 ML/HR: 5; 450 INJECTION, SOLUTION INTRAVENOUS at 08:52

## 2019-11-13 RX ADMIN — LIDOCAINE HYDROCHLORIDE 50 MG: 20 INJECTION, SOLUTION EPIDURAL; INFILTRATION; INTRACAUDAL; PERINEURAL at 10:04

## 2019-11-13 RX ADMIN — PROPOFOL 80 MG: 10 INJECTION, EMULSION INTRAVENOUS at 10:03

## 2019-11-13 RX ADMIN — SODIUM CHLORIDE, SODIUM GLUCONATE, SODIUM ACETATE, POTASSIUM CHLORIDE, AND MAGNESIUM CHLORIDE: 526; 502; 368; 37; 30 INJECTION, SOLUTION INTRAVENOUS at 10:00

## 2019-11-13 RX ADMIN — PROPOFOL 100 MG: 10 INJECTION, EMULSION INTRAVENOUS at 10:10

## 2019-11-13 RX ADMIN — PROPOFOL 100 MG: 10 INJECTION, EMULSION INTRAVENOUS at 10:16

## 2019-11-13 RX ADMIN — PROPOFOL 50 MG: 10 INJECTION, EMULSION INTRAVENOUS at 10:05

## 2019-11-13 NOTE — ANESTHESIA PREPROCEDURE EVALUATION
Anesthesia Evaluation     Patient summary reviewed and Nursing notes reviewed   NPO Solid Status: > 8 hours  NPO Liquid Status: > 8 hours           Airway   Possible difficult intubation  Dental      Pulmonary - normal exam   Cardiovascular - normal exam    (+) hypertension 2 medications or greater, hyperlipidemia,       Neuro/Psych  (+) numbness,     GI/Hepatic/Renal/Endo    (+) morbid obesity,  diabetes mellitus,     Musculoskeletal (-) negative ROS    Abdominal   (+) obese,    Substance History - negative use     OB/GYN negative ob/gyn ROS         Other - negative ROS                       Anesthesia Plan    ASA 3     MAC     intravenous induction     Anesthetic plan, all risks, benefits, and alternatives have been provided, discussed and informed consent has been obtained with: patient.    Plan discussed with CRNA.

## 2019-11-13 NOTE — H&P
No chief complaint on file.      Subjective    Sharon Esposito is a 73 y.o. female. she is here today for follow-up.    History of Present Illness  73-year-old female presents to discuss colonoscopy results.  She denies any abdominal pain, nausea, vomiting or changes within her bowel habits.  She denies any melena or hematochezia.  Colonoscopy was completed 8/14/2019 and noted adequate prep.  A large polyp was noted in the ascending colon which was semi-pedunculated polyp resection was incomplete area was tattooed with injection of Delaney ink.  Many small mouth diverticula were found in sigmoid, descending, transverse and ascending colon.  Hemorrhoids were noted.  Polyp found to be villous adenoma measuring 0.9 x 0.3 x 0.3 cm  Plan; schedule patient for repeat colonoscopy in 3 months due to incomplete polypectomy villous adenoma.  Recommend fiber supplementation daily for diverticulosis.  Follow-up after repeat colonoscopy return office sooner if needed       The following portions of the patient's history were reviewed and updated as appropriate:   Past Medical History:   Diagnosis Date   • Acquired hypothyroidism     with hashimoto's thyroiditis      • Acute bronchitis    • Acute sinusitis    • Allergic rhinitis    • Bilateral hand pain    • Cough    • Diabetes mellitus (CMS/HCC)    • Diverticulitis of colon    • Edema     idiopathic state, history of      • Encounter for gynecological examination (general) (routine) without abnormal findings    • Encounter for screening for osteoporosis    • Essential hypertension    • Hemorrhoids     internal & external      • Hx of bone density study     DEXA BONE DENSITY APPENDICULAR: osteopenia, 12/18/2013   • Hx of screening mammography     MAMMOGRAM SCREENING: x2, 02/26/2014   • Hyperlipidemia     with elevated low density lipoprotein   • 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     history   • Infection of skin and subcutaneous tissue      Infection of skin AND/OR subcutaneous tissue      • Onychogryposis     recurrent ingrown toenail      • Open wound of face    • Osteopenia    • Screening for malignant neoplasm of breast    • Screening for osteoporosis    • Seasonal allergies     Seasonal allergy      • Vitamin deficiency      Past Surgical History:   Procedure Laterality Date   • CATARACT EXTRACTION, BILATERAL     • COLONOSCOPY      Approximately 2009   • COLONOSCOPY N/A 2019    Procedure: COLONOSCOPY Monday or Wed;  Surgeon: Tony Curiel MD;  Location: [facility] ENDOSCOPY;  Service: Gastroenterology   • ENDOSCOPY AND COLONOSCOPY  2009    A few small diverticula in the sigmoid & the descending colon. Small internal & external hemorrhoids were present. Colonoscopy otherwise normal   • INJECTION OF MEDICATION  2015    Celestone (betamethasone) x2   • INJECTION OF MEDICATION  2014    Toradol    • PAP SMEAR  2009    Negative for intraepithelial lesion or malignancy   • SKIN BIOPSY  2010    L neck lesion- seborrheic keratosis. right infraorbital-seborrheic keratosis, right lateral orbital- intradermal nevus. forehead lesion-early squamous papilloma     Family History   Problem Relation Age of Onset   • Breast cancer Mother    • Cancer Mother 60        Breast   • Diabetes Mother    • Heart disease Mother    • Hypertension Mother    • Coronary artery disease Father    • Breast cancer Other    • Coronary artery disease Other    • Heart disease Other      OB History      Para Term  AB Living    0 0 0 0 0 0    SAB TAB Ectopic Molar Multiple Live Births    0 0 0   0          Prior to Admission medications    Medication Sig Start Date End Date Taking? Authorizing Provider   atorvastatin (LIPITOR) 40 MG tablet Take 1 tablet by mouth Daily. 3/15/19  Yes Jeffry Irby MD   Blood Glucose Monitoring Suppl (ONE TOUCH ULTRA SYSTEM KIT) W/DEVICE kit Use for Home Glucose Monitoring 17  Yes Jeffry Irby  MD SUBHASH   Cyanocobalamin (VITAMIN B-12) 1000 MCG sublingual tablet Place 1 tablet under your tongue daily for the rest of your life. 2/20/19  Yes Jeffry Irby MD   fluticasone (FLONASE) 50 MCG/ACT nasal spray 2 sprays into the nostril(s) as directed by provider Daily. 1/8/19  Yes Sonido Moyer MD   glucose blood (ONE TOUCH ULTRA TEST) test strip Test Blood Sugars Once daily 5/22/17  Yes Jeffry Irby MD   levothyroxine (SYNTHROID, LEVOTHROID) 88 MCG tablet Take 1 tablet by mouth Daily. 3/15/19  Yes Jeffry Irby MD   Loratadine 10 MG capsule Take  by mouth.   Yes Lesly Magana MD   losartan-hydrochlorothiazide (HYZAAR) 100-25 MG per tablet Take 1 tablet by mouth Daily. 3/15/19  Yes Jeffry Irby MD   metFORMIN ER (GLUCOPHAGE-XR) 500 MG 24 hr tablet Take 2 tablets by mouth Daily Before Supper. 2/20/19  Yes Jeffry Irby MD   metoprolol tartrate (LOPRESSOR) 25 MG tablet TAKE 1/2 TABLET BY MOUTH EVERY 12 (TWELVE) HOURS. 3/11/19  Yes Jeffry Irby MD   ONE TOUCH ULTRA TEST test strip TEST BLOOD SUGARS ONCE DAILY 7/19/19  Yes Jeffry Irby MD ONETOUCH DELICA LANCETS 33G misc USE TO TEST BLOOD SUGAR ONCE DAILY 7/19/19  Yes Jeffry Irby MD ONETOUCH DELICA LANCETS FINE misc Test Blood Sugars Once Daily 5/22/17  Yes Jeffry Irby MD   vitamin C (ASCORBIC ACID) 500 MG tablet Take 500 mg by mouth Daily.   Yes ProviderLesly MD     Allergies   Allergen Reactions   • Codeine GI Intolerance   • Latex Rash   • Neosporin [Neomycin-Polymyxin-Gramicidin] Rash     Social History     Socioeconomic History   • Marital status: Single     Spouse name: Not on file   • Number of children: Not on file   • Years of education: Not on file   • Highest education level: Not on file   Tobacco Use   • Smoking status: Never Smoker   • Smokeless tobacco: Never Used   Substance and Sexual Activity   • Alcohol use: No   • Drug use: No   • Sexual activity: Defer       Review of  "Systems  Review of Systems   Constitutional: Negative for activity change, appetite change, chills, diaphoresis, fatigue, fever and unexpected weight change.   HENT: Negative for sore throat and trouble swallowing.    Respiratory: Negative for shortness of breath.    Gastrointestinal: Negative for abdominal distention, abdominal pain, anal bleeding, blood in stool, constipation, diarrhea, nausea, rectal pain and vomiting.   Musculoskeletal: Negative for arthralgias.   Skin: Negative for pallor.   Neurological: Negative for light-headedness.        BP (!) 189/60   Pulse 81   Temp 98.3 °F (36.8 °C)   Resp 16   Ht 162.6 cm (64\")   Wt 87.6 kg (193 lb 3 oz)   LMP  (LMP Unknown)   SpO2 94%   BMI 33.16 kg/m²     Objective    Physical Exam   Constitutional: She is oriented to person, place, and time. She appears well-developed and well-nourished. She is cooperative. No distress.   HENT:   Head: Normocephalic and atraumatic.   Neck: Normal range of motion. Neck supple. No thyromegaly present.   Cardiovascular: Normal rate, regular rhythm and normal heart sounds.   Pulmonary/Chest: Effort normal and breath sounds normal. She has no wheezes. She has no rhonchi. She has no rales.   Abdominal: Soft. Normal appearance and bowel sounds are normal. She exhibits no shifting dullness, no distension, no fluid wave and no ascites. There is no hepatosplenomegaly. There is no tenderness. There is no rigidity and no guarding. No hernia.   Lymphadenopathy:     She has no cervical adenopathy.   Neurological: She is alert and oriented to person, place, and time.   Skin: Skin is warm, dry and intact. No rash noted. No pallor.   Psychiatric: She has a normal mood and affect. Her speech is normal.     Admission on 08/14/2019, Discharged on 08/14/2019   Component Date Value Ref Range Status   • Case Report 08/14/2019    Final                    Value:Surgical Pathology Report                         Case: [id number]                       "            Authorizing Provider:  Tony Curiel MD        Collected:           08/14/2019 10:07 AM          Ordering Location:     [facility]             Received:            08/14/2019 12:56 PM                                 [place] [facility]                                                     Pathologist:           Danilo Sanchez MD                                                         Specimen:    Large Intestine, Right / Ascending Colon, POLYP                                           • Final Diagnosis 08/14/2019    Final                    Value:This result contains rich text formatting which cannot be displayed here.   • Gross Description 08/14/2019    Final                    Value:This result contains rich text formatting which cannot be displayed here.     Assessment/Plan      1. Villous adenoma of colon    .       Orders placed during this encounter include:  Orders Placed This Encounter   Procedures   • Obtain Informed Consent     Standing Status:   Standing     Number of Occurrences:   1     Order Specific Question:   Informed Consent Given For     Answer:   Colonoscopy   • POC Glucose Once     Prior to Procedure on ALL Diabetic Patients     Standing Status:   Standing     Number of Occurrences:   1   • Insert Peripheral IV     Standing Status:   Standing     Number of Occurrences:   1       COLONOSCOPY A Wed in Nov (N/A)    Review and/or summary of lab tests, radiology, procedures, medications. Review and summary of old records and obtaining of history. The risks and benefits of my recommendations, as well as other treatment options were discussed with the patient today. Questions were answered.    New Medications Ordered This Visit   Medications   • dextrose 5 % and sodium chloride 0.45 % infusion       Follow-up: No Follow-up on file.          This document has been electronically signed by Tony Curiel MD on November 13, 2019 9:34 AM             Results for orders placed or  "performed during the hospital encounter of 08/14/19   Tissue Pathology Exam   Result Value Ref Range    Case Report       Surgical Pathology Report                         Case: [id number]                                  Authorizing Provider:  Tony Curiel MD        Collected:           08/14/2019 10:07 AM          [facility]             Received:            08/14/2019 12:56 PM                                 [place] [facility]                                                     Pathologist:           Danilo Sanchez MD                                                         Specimen:    Large Intestine, Right / Ascending Colon, POLYP                                            Final Diagnosis       POLYP, ASCENDING COLON:  VILLOUS ADENOMA.      Gross Description       The container is labeled \"polyp, ascending colon\" and has an elongate fragment of white tissue measuring 0.9 x 0.3 x 0.3 cm.  It is entirely embedded as 1A.     Results for orders placed or performed in visit on 05/29/19   Microalbumin / Creatinine Urine Ratio - Urine, Clean Catch   Result Value Ref Range    Microalbumin/Creatinine Ratio  mg/g    Creatinine, Urine 36.3 mg/dL    Microalbumin, Urine <1.2 mg/L   TSH   Result Value Ref Range    TSH 1.450 0.270 - 4.200 mIU/mL   T4, Free   Result Value Ref Range    Free T4 1.42 0.93 - 1.70 ng/dL   Hemoglobin A1c   Result Value Ref Range    Hemoglobin A1C 6.21 (H) 4.80 - 5.60 %   Vitamin B12   Result Value Ref Range    Vitamin B-12 1,000 (H) 211 - 946 pg/mL   Lipid Panel   Result Value Ref Range    Total Cholesterol 200 0 - 200 mg/dL    Triglycerides 165 (H) 0 - 150 mg/dL    HDL Cholesterol 74 (H) 40 - 60 mg/dL    LDL Cholesterol  93 0 - 100 mg/dL    VLDL Cholesterol 33 5 - 40 mg/dL    LDL/HDL Ratio 1.26    Comprehensive Metabolic Panel   Result Value Ref Range    Glucose 109 (H) 65 - 99 mg/dL    BUN 12 8 - 23 mg/dL    Creatinine 0.78 0.57 - 1.00 mg/dL    Sodium 140 136 " - 145 mmol/L    Potassium 4.1 3.5 - 5.2 mmol/L    Chloride 101 98 - 107 mmol/L    CO2 23.9 22.0 - 29.0 mmol/L    Calcium 9.1 8.6 - 10.5 mg/dL    Total Protein 7.2 6.0 - 8.5 g/dL    Albumin 4.40 3.50 - 5.20 g/dL    ALT (SGPT) 16 1 - 33 U/L    AST (SGOT) 17 1 - 32 U/L    Alkaline Phosphatase 97 39 - 117 U/L    Total Bilirubin 0.4 0.2 - 1.2 mg/dL    eGFR Non African Amer 73 >60 mL/min/1.73    Globulin 2.8 gm/dL    A/G Ratio 1.6 g/dL    BUN/Creatinine Ratio 15.4 7.0 - 25.0    Anion Gap 15.1 mmol/L   Results for orders placed or performed in visit on 11/28/18   CBC Auto Differential   Result Value Ref Range    WBC 9.61 3.20 - 9.80 10*3/mm3    RBC 4.90 3.77 - 5.16 10*6/mm3    Hemoglobin 13.7 12.0 - 15.5 g/dL    Hematocrit 42.2 35.0 - 45.0 %    MCV 86.1 80.0 - 98.0 fL    MCH 28.0 26.5 - 34.0 pg    MCHC 32.5 31.4 - 36.0 g/dL    RDW 15.1 (H) 11.5 - 14.5 %    RDW-SD 47.8 (H) 36.4 - 46.3 fl    MPV 9.4 8.0 - 12.0 fL    Platelets 346 150 - 450 10*3/mm3    Neutrophil % 58.9 37.0 - 80.0 %    Lymphocyte % 29.7 10.0 - 50.0 %    Monocyte % 7.0 0.0 - 12.0 %    Eosinophil % 3.9 0.0 - 7.0 %    Basophil % 0.3 0.0 - 2.0 %    Immature Grans % 0.2 0.0 - 0.5 %    Neutrophils, Absolute 5.67 2.00 - 8.60 10*3/mm3    Lymphocytes, Absolute 2.85 0.60 - 4.20 10*3/mm3    Monocytes, Absolute 0.67 0.00 - 0.90 10*3/mm3    Eosinophils, Absolute 0.37 0.00 - 0.70 10*3/mm3    Basophils, Absolute 0.03 0.00 - 0.20 10*3/mm3    Immature Grans, Absolute 0.02 0.00 - 0.02 10*3/mm3   Microalbumin / Creatinine Urine Ratio - Urine, Clean Catch   Result Value Ref Range    Microalbumin/Creatinine Ratio  0.0 - 30.0 mg/g    Creatinine, Urine 41.4 mg/dL    Microalbumin, Urine <0.6 mg/L   TSH   Result Value Ref Range    TSH 2.090 0.460 - 4.680 mIU/mL   T4, Free   Result Value Ref Range    Free T4 1.32 0.78 - 2.19 ng/dL   Hemoglobin A1c   Result Value Ref Range    Hemoglobin A1C 6.4 (H) 4 - 5.6 %   Vitamin B12   Result Value Ref Range    Vitamin B-12 222 (L) 239 - 931  pg/mL   Lipid Panel   Result Value Ref Range    Total Cholesterol 181 0 - 199 mg/dL    Triglycerides 200 (H) 20 - 199 mg/dL    HDL Cholesterol 59 (L) 60 - 200 mg/dL    LDL Cholesterol  88 1 - 129 mg/dL    LDL/HDL Ratio 1.39 0.00 - 3.22   Comprehensive Metabolic Panel   Result Value Ref Range    Glucose 114 (H) 60 - 100 mg/dL    BUN 14 7 - 21 mg/dL    Creatinine 0.82 0.50 - 1.00 mg/dL    Sodium 139 137 - 145 mmol/L    Potassium 3.8 3.5 - 5.1 mmol/L    Chloride 96 95 - 110 mmol/L    CO2 30.0 22.0 - 31.0 mmol/L    Calcium 9.7 8.4 - 10.2 mg/dL    Total Protein 8.0 6.3 - 8.6 g/dL    Albumin 4.70 3.40 - 4.80 g/dL    ALT (SGPT) 21 9 - 52 U/L    AST (SGOT) 33 14 - 36 U/L    Alkaline Phosphatase 116 38 - 126 U/L    Total Bilirubin 0.9 0.2 - 1.3 mg/dL    eGFR Non African Amer 69 39 - 90 mL/min/1.73    Globulin 3.3 2.3 - 3.5 gm/dL    A/G Ratio 1.4 1.1 - 1.8 g/dL    BUN/Creatinine Ratio 17.1 7.0 - 25.0    Anion Gap 13.0 5.0 - 15.0 mmol/L   Results for orders placed or performed in visit on 05/23/18   CBC Auto Differential   Result Value Ref Range    WBC 10.01 (H) 3.20 - 9.80 10*3/mm3    RBC 4.91 3.77 - 5.16 10*6/mm3    Hemoglobin 13.8 12.0 - 15.5 g/dL    Hematocrit 42.2 35.0 - 45.0 %    MCV 85.9 80.0 - 98.0 fL    MCH 28.1 26.5 - 34.0 pg    MCHC 32.7 31.4 - 36.0 g/dL    RDW 15.2 (H) 11.5 - 14.5 %    RDW-SD 48.1 (H) 36.4 - 46.3 fl    MPV 9.6 8.0 - 12.0 fL    Platelets 345 150 - 450 10*3/mm3    Neutrophil % 48.8 37.0 - 80.0 %    Lymphocyte % 38.5 10.0 - 50.0 %    Monocyte % 6.9 0.0 - 12.0 %    Eosinophil % 5.0 0.0 - 7.0 %    Basophil % 0.5 0.0 - 2.0 %    Immature Grans % 0.3 0.0 - 0.5 %    Neutrophils, Absolute 4.89 2.00 - 8.60 10*3/mm3    Lymphocytes, Absolute 3.85 0.60 - 4.20 10*3/mm3    Monocytes, Absolute 0.69 0.00 - 0.90 10*3/mm3    Eosinophils, Absolute 0.50 0.00 - 0.70 10*3/mm3    Basophils, Absolute 0.05 0.00 - 0.20 10*3/mm3    Immature Grans, Absolute 0.03 (H) 0.00 - 0.02 10*3/mm3     *Note: Due to a large number of  results and/or encounters for the requested time period, some results have not been displayed. A complete set of results can be found in Results Review.

## 2019-11-13 NOTE — ANESTHESIA POSTPROCEDURE EVALUATION
Patient: Sharon Esposito    Procedure Summary     Date:  11/13/19 Room / Location:  [facility] ENDOSCOPY 1 / [facility] ENDOSCOPY    Anesthesia Start:  1000 Anesthesia Stop:  1023    Procedure:  COLONOSCOPY A Wed in Nov (N/A ) Diagnosis:       Villous adenoma of colon      (Villous adenoma of colon [D37.4])    Surgeon:  Tony Curiel MD Provider:  Jacobo Booth CRNA    Anesthesia Type:  MAC ASA Status:  3          Anesthesia Type: MAC  Last vitals  BP   (!) 189/60 (11/13/19 0832)   Temp   98.3 °F (36.8 °C) (11/13/19 0832)   Pulse   81 (11/13/19 0832)   Resp   16 (11/13/19 0832)     SpO2   94 % (11/13/19 0832)     Post Anesthesia Care and Evaluation    Patient location during evaluation: PACU  Patient participation: complete - patient participated  Level of consciousness: awake and alert  Pain score: 1  Pain management: adequate  Airway patency: patent  Anesthetic complications: No anesthetic complications  PONV Status: none  Cardiovascular status: acceptable  Respiratory status: acceptable  Hydration status: acceptable  Post Neuraxial Block status: Motor and sensory function returned to baseline

## 2019-11-14 LAB
LAB AP CASE REPORT: NORMAL
PATH REPORT.FINAL DX SPEC: NORMAL
PATH REPORT.GROSS SPEC: NORMAL

## 2019-11-20 ENCOUNTER — OFFICE VISIT (OUTPATIENT)
Dept: GASTROENTEROLOGY | Facility: CLINIC | Age: 74
End: 2019-11-20

## 2019-11-20 VITALS
BODY MASS INDEX: 33.19 KG/M2 | HEART RATE: 76 BPM | DIASTOLIC BLOOD PRESSURE: 68 MMHG | HEIGHT: 64 IN | SYSTOLIC BLOOD PRESSURE: 122 MMHG | WEIGHT: 194.4 LBS

## 2019-11-20 DIAGNOSIS — D12.2 ADENOMATOUS POLYP OF ASCENDING COLON: Primary | ICD-10-CM

## 2019-11-20 PROCEDURE — 99213 OFFICE O/P EST LOW 20 MIN: CPT | Performed by: NURSE PRACTITIONER

## 2019-11-20 NOTE — PATIENT INSTRUCTIONS

## 2019-11-20 NOTE — PROGRESS NOTES
Chief Complaint   Patient presents with   • Colon Polyps       Subjective    Sharon Esposito is a 73 y.o. female. she is here today for follow-up.    History of Present Illness  73-year-old female presents to discuss colonoscopy results.  Denies any abdominal pain, nausea, vomiting or change within her bowel habits.  Denies any melena or hematochezia.  Colonoscopy was completed 11/13/2019 due to incomplete polypectomy on previous colonoscopy in August.  Colonoscopy noted adequate prep perianal digital rectal exam was normal multiple small and large mouth diverticula found in sigmoid and descending colon.  A large polyp was found in the ascending colon biopsy was taken tattoo was seen in the ascending colon.       The following portions of the patient's history were reviewed and updated as appropriate:   Past Medical History:   Diagnosis Date   • Acquired hypothyroidism     with hashimoto's thyroiditis      • Acute bronchitis    • Acute sinusitis    • Allergic rhinitis    • Bilateral hand pain    • Cough    • Diabetes mellitus (CMS/HCC)    • Diverticulitis of colon    • Edema     idiopathic state, history of      • Encounter for gynecological examination (general) (routine) without abnormal findings    • Encounter for screening for osteoporosis    • Essential hypertension    • Hemorrhoids     internal & external      • Hx of bone density study     DEXA BONE DENSITY APPENDICULAR: osteopenia, 12/18/2013   • Hx of screening mammography     MAMMOGRAM SCREENING: x2, 02/26/2014   • Hyperlipidemia     with elevated low density lipoprotein   • 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     history   • Infection of skin and subcutaneous tissue     Infection of skin AND/OR subcutaneous tissue      • Onychogryposis     recurrent ingrown toenail      • Open wound of face    • Osteopenia    • Screening for malignant neoplasm of breast    • Screening for osteoporosis    • Seasonal allergies      Seasonal allergy      • Vitamin deficiency      Past Surgical History:   Procedure Laterality Date   • CATARACT EXTRACTION, BILATERAL     • COLONOSCOPY      Approximately 2009   • COLONOSCOPY N/A 2019    Procedure: COLONOSCOPY Monday or Wed;  Surgeon: Tony Curiel MD;  Location: [facility];  Service: Gastroenterology   • COLONOSCOPY N/A 2019    Procedure: COLONOSCOPY A Wed in Nov;  Surgeon: Tony Curiel MD;  Location: [facility];  Service: Gastroenterology   • ENDOSCOPY AND COLONOSCOPY  2009    A few small diverticula in the sigmoid & the descending colon. Small internal & external hemorrhoids were present. Colonoscopy otherwise normal   • INJECTION OF MEDICATION  2015    Celestone (betamethasone) x2   • INJECTION OF MEDICATION  2014    Toradol    • PAP SMEAR  2009    Negative for intraepithelial lesion or malignancy   • SKIN BIOPSY  2010    L neck lesion- seborrheic keratosis. right infraorbital-seborrheic keratosis, right lateral orbital- intradermal nevus. forehead lesion-early squamous papilloma     Family History   Problem Relation Age of Onset   • Breast cancer Mother    • Cancer Mother 60        Breast   • Diabetes Mother    • Heart disease Mother    • Hypertension Mother    • Coronary artery disease Father    • Breast cancer Other    • Coronary artery disease Other    • Heart disease Other      OB History      Para Term  AB Living    0 0 0 0 0 0    SAB TAB Ectopic Molar Multiple Live Births    0 0 0   0          Prior to Admission medications    Medication Sig Start Date End Date Taking? Authorizing Provider   atorvastatin (LIPITOR) 40 MG tablet Take 1 tablet by mouth Daily. 3/15/19  Yes Jeffry Irby MD   Blood Glucose Monitoring Suppl (ONE TOUCH ULTRA SYSTEM KIT) W/DEVICE kit Use for Home Glucose Monitoring 17  Yes Jeffry Irby MD   Cyanocobalamin (VITAMIN B-12) 1000 MCG sublingual tablet Place 1 tablet under your  tongue daily for the rest of your life. 2/20/19  Yes Jeffry Irby MD   fluticasone (FLONASE) 50 MCG/ACT nasal spray 2 sprays into the nostril(s) as directed by provider Daily. 1/8/19  Yes Sonido Moyer MD   glucose blood (ONE TOUCH ULTRA TEST) test strip Test Blood Sugars Once daily 5/22/17  Yes Jeffry Irby MD   levothyroxine (SYNTHROID, LEVOTHROID) 88 MCG tablet Take 1 tablet by mouth Daily. 3/15/19  Yes Jeffry Irby MD   Loratadine 10 MG capsule Take  by mouth.   Yes Lesly Magana MD   losartan-hydrochlorothiazide (HYZAAR) 100-25 MG per tablet Take 1 tablet by mouth Daily. 3/15/19  Yes Jeffry Irby MD   metFORMIN ER (GLUCOPHAGE-XR) 500 MG 24 hr tablet Take 2 tablets by mouth Daily Before Supper. 2/20/19  Yes Jeffry Irby MD   metoprolol tartrate (LOPRESSOR) 25 MG tablet TAKE 1/2 TABLET BY MOUTH EVERY 12 (TWELVE) HOURS. 3/11/19  Yes Jeffry Irby MD   ONE TOUCH ULTRA TEST test strip TEST BLOOD SUGARS ONCE DAILY 7/19/19  Yes Jeffry Irby MD ONETOUCH DELICA LANCETS 33G misc USE TO TEST BLOOD SUGAR ONCE DAILY 7/19/19  Yes Jeffry Irby MD ONETOUCH DELICA LANCETS FINE misc Test Blood Sugars Once Daily 5/22/17  Yes Jeffry Irby MD   vitamin C (ASCORBIC ACID) 500 MG tablet Take 500 mg by mouth Daily.   Yes ProviderLesly MD   polyethylene glycol (GoLYTELY) 236 g solution Starting at noon on day prior to procedure, drink 8 ounces every 30 minutes until all gone or stools are clear. May add flavor packet. 8/21/19 11/20/19 Yes Laura Robles APRN     Allergies   Allergen Reactions   • Codeine GI Intolerance   • Latex Rash   • Neosporin [Neomycin-Polymyxin-Gramicidin] Rash     Social History     Socioeconomic History   • Marital status: Single     Spouse name: Not on file   • Number of children: Not on file   • Years of education: Not on file   • Highest education level: Not on file   Tobacco Use   • Smoking status: Never Smoker   • Smokeless tobacco:  "Never Used   Substance and Sexual Activity   • Alcohol use: No   • Drug use: No   • Sexual activity: Defer       Review of Systems  Review of Systems   Constitutional: Negative for activity change, appetite change, chills, diaphoresis, fatigue, fever and unexpected weight change.   HENT: Negative for sore throat and trouble swallowing.    Respiratory: Negative for shortness of breath.    Gastrointestinal: Negative for abdominal distention, abdominal pain, anal bleeding, blood in stool, constipation, diarrhea, nausea, rectal pain and vomiting.   Musculoskeletal: Negative for arthralgias.   Skin: Negative for pallor.   Neurological: Negative for light-headedness.        /68 (BP Location: Left arm)   Pulse 76   Ht 162.6 cm (64\")   Wt 88.2 kg (194 lb 6.4 oz)   LMP  (LMP Unknown)   BMI 33.37 kg/m²     Objective    Physical Exam   Constitutional: She is oriented to person, place, and time. She appears well-developed and well-nourished. She is cooperative. No distress.   HENT:   Head: Normocephalic and atraumatic.   Neck: Normal range of motion. Neck supple. No thyromegaly present.   Cardiovascular: Normal rate, regular rhythm and normal heart sounds.   Pulmonary/Chest: Effort normal and breath sounds normal. She has no wheezes. She has no rhonchi. She has no rales.   Abdominal: Soft. Normal appearance and bowel sounds are normal. She exhibits no distension. There is no hepatosplenomegaly. There is no tenderness. There is no rigidity and no guarding. No hernia.   Lymphadenopathy:     She has no cervical adenopathy.   Neurological: She is alert and oriented to person, place, and time.   Skin: Skin is warm, dry and intact. No rash noted. No pallor.   Psychiatric: She has a normal mood and affect. Her speech is normal.     Admission on 11/13/2019, Discharged on 11/13/2019   Component Date Value Ref Range Status   • Case Report 11/13/2019    Final                    Value:Surgical Pathology Report                    "      Case: [id number]                                  Authorizing Provider:  Tony Curiel MD        Collected:           11/13/2019 10:22 AM          Ordering Location:     [facility]             Received:            11/13/2019 01:35 PM                                 [facility] [facility]                                                     Pathologist:           Danilo Sanchez MD                                                         Specimen:    Large Intestine, Right / Ascending Colon, ascending polyp   cold bx                       • Final Diagnosis 11/13/2019    Final                    Value:This result contains rich text formatting which cannot be displayed here.   • Gross Description 11/13/2019    Final                    Value:This result contains rich text formatting which cannot be displayed here.     Assessment/Plan      1. Adenomatous polyp of ascending colon    .   Refer patient to Dr. Young to discuss colon resection due to large flat adenomatous colonic polyp of right colon unable to be removed endoscopically.  Consultation appreciated.    Orders placed during this encounter include:  Orders Placed This Encounter   Procedures   • Ambulatory Referral to General Surgery     Referral Priority:   Routine     Referral Type:   Consultation     Referral Reason:   Specialty Services Required     Referred to Provider:   Ozzy Young MD     Requested Specialty:   General Surgery     Number of Visits Requested:   1       * Surgery not found *    Review and/or summary of lab tests, radiology, procedures, medications. Review and summary of old records and obtaining of history. The risks and benefits of my recommendations, as well as other treatment options were discussed with the patient today. Questions were answered.    No orders of the defined types were placed in this encounter.      Follow-up: Return in about 4 weeks (around 12/18/2019).          This document has been electronically  "signed by LEXI Chawla on November 20, 2019 1:44 PM             Results for orders placed or performed during the hospital encounter of 11/13/19   Tissue Pathology Exam   Result Value Ref Range    Case Report       Surgical Pathology Report                         Case: [id number]                                  Authorizing Provider:  Tony Curiel MD        Collected:           11/13/2019 10:22 AM          Ordering Location:     [facility]             Received:            11/13/2019 01:35 PM                                 [place] [facility]                                                     Pathologist:           Danilo Sanchez MD                                                         Specimen:    Large Intestine, Right / Ascending Colon, ascending polyp   cold bx                        Final Diagnosis       POLYP, ASCENDING COLON:   SESSILE SERRATED ADENOMA.       Gross Description       The container is labeled \"polyp, ascending colon\" and has a nodular fragment of white soft tissue measuring 0.4 cm in greatest dimension. The entire specimen is embedded as 1A.     Results for orders placed or performed during the hospital encounter of 08/14/19   Tissue Pathology Exam   Result Value Ref Range    Case Report       Surgical Pathology Report                         Case: [id number]                                  Authorizing Provider:  Tony Curiel MD        Collected:           08/14/2019 10:07 AM          Ordering Location:     [facility]             Received:            08/14/2019 12:56 PM                                 [place] [facility]                                                     Pathologist:           Danilo Sanchez MD                                                         Specimen:    Large Intestine, Right / Ascending Colon, POLYP                                            Final Diagnosis       POLYP, ASCENDING COLON:  VILLOUS ADENOMA.      Gross " "Description       The container is labeled \"polyp, ascending colon\" and has an elongate fragment of white tissue measuring 0.9 x 0.3 x 0.3 cm.  It is entirely embedded as 1A.     Results for orders placed or performed in visit on 05/29/19   Microalbumin / Creatinine Urine Ratio - Urine, Clean Catch   Result Value Ref Range    Microalbumin/Creatinine Ratio  mg/g    Creatinine, Urine 36.3 mg/dL    Microalbumin, Urine <1.2 mg/L   TSH   Result Value Ref Range    TSH 1.450 0.270 - 4.200 mIU/mL   T4, Free   Result Value Ref Range    Free T4 1.42 0.93 - 1.70 ng/dL   Hemoglobin A1c   Result Value Ref Range    Hemoglobin A1C 6.21 (H) 4.80 - 5.60 %   Vitamin B12   Result Value Ref Range    Vitamin B-12 1,000 (H) 211 - 946 pg/mL   Lipid Panel   Result Value Ref Range    Total Cholesterol 200 0 - 200 mg/dL    Triglycerides 165 (H) 0 - 150 mg/dL    HDL Cholesterol 74 (H) 40 - 60 mg/dL    LDL Cholesterol  93 0 - 100 mg/dL    VLDL Cholesterol 33 5 - 40 mg/dL    LDL/HDL Ratio 1.26    Comprehensive Metabolic Panel   Result Value Ref Range    Glucose 109 (H) 65 - 99 mg/dL    BUN 12 8 - 23 mg/dL    Creatinine 0.78 0.57 - 1.00 mg/dL    Sodium 140 136 - 145 mmol/L    Potassium 4.1 3.5 - 5.2 mmol/L    Chloride 101 98 - 107 mmol/L    CO2 23.9 22.0 - 29.0 mmol/L    Calcium 9.1 8.6 - 10.5 mg/dL    Total Protein 7.2 6.0 - 8.5 g/dL    Albumin 4.40 3.50 - 5.20 g/dL    ALT (SGPT) 16 1 - 33 U/L    AST (SGOT) 17 1 - 32 U/L    Alkaline Phosphatase 97 39 - 117 U/L    Total Bilirubin 0.4 0.2 - 1.2 mg/dL    eGFR Non African Amer 73 >60 mL/min/1.73    Globulin 2.8 gm/dL    A/G Ratio 1.6 g/dL    BUN/Creatinine Ratio 15.4 7.0 - 25.0    Anion Gap 15.1 mmol/L   Results for orders placed or performed in visit on 11/28/18   CBC Auto Differential   Result Value Ref Range    WBC 9.61 3.20 - 9.80 10*3/mm3    RBC 4.90 3.77 - 5.16 10*6/mm3    Hemoglobin 13.7 12.0 - 15.5 g/dL    Hematocrit 42.2 35.0 - 45.0 %    MCV 86.1 80.0 - 98.0 fL    MCH 28.0 26.5 - 34.0 pg "    MCHC 32.5 31.4 - 36.0 g/dL    RDW 15.1 (H) 11.5 - 14.5 %    RDW-SD 47.8 (H) 36.4 - 46.3 fl    MPV 9.4 8.0 - 12.0 fL    Platelets 346 150 - 450 10*3/mm3    Neutrophil % 58.9 37.0 - 80.0 %    Lymphocyte % 29.7 10.0 - 50.0 %    Monocyte % 7.0 0.0 - 12.0 %    Eosinophil % 3.9 0.0 - 7.0 %    Basophil % 0.3 0.0 - 2.0 %    Immature Grans % 0.2 0.0 - 0.5 %    Neutrophils, Absolute 5.67 2.00 - 8.60 10*3/mm3    Lymphocytes, Absolute 2.85 0.60 - 4.20 10*3/mm3    Monocytes, Absolute 0.67 0.00 - 0.90 10*3/mm3    Eosinophils, Absolute 0.37 0.00 - 0.70 10*3/mm3    Basophils, Absolute 0.03 0.00 - 0.20 10*3/mm3    Immature Grans, Absolute 0.02 0.00 - 0.02 10*3/mm3   Microalbumin / Creatinine Urine Ratio - Urine, Clean Catch   Result Value Ref Range    Microalbumin/Creatinine Ratio  0.0 - 30.0 mg/g    Creatinine, Urine 41.4 mg/dL    Microalbumin, Urine <0.6 mg/L   TSH   Result Value Ref Range    TSH 2.090 0.460 - 4.680 mIU/mL   T4, Free   Result Value Ref Range    Free T4 1.32 0.78 - 2.19 ng/dL   Hemoglobin A1c   Result Value Ref Range    Hemoglobin A1C 6.4 (H) 4 - 5.6 %   Vitamin B12   Result Value Ref Range    Vitamin B-12 222 (L) 239 - 931 pg/mL   Lipid Panel   Result Value Ref Range    Total Cholesterol 181 0 - 199 mg/dL    Triglycerides 200 (H) 20 - 199 mg/dL    HDL Cholesterol 59 (L) 60 - 200 mg/dL    LDL Cholesterol  88 1 - 129 mg/dL    LDL/HDL Ratio 1.39 0.00 - 3.22   Comprehensive Metabolic Panel   Result Value Ref Range    Glucose 114 (H) 60 - 100 mg/dL    BUN 14 7 - 21 mg/dL    Creatinine 0.82 0.50 - 1.00 mg/dL    Sodium 139 137 - 145 mmol/L    Potassium 3.8 3.5 - 5.1 mmol/L    Chloride 96 95 - 110 mmol/L    CO2 30.0 22.0 - 31.0 mmol/L    Calcium 9.7 8.4 - 10.2 mg/dL    Total Protein 8.0 6.3 - 8.6 g/dL    Albumin 4.70 3.40 - 4.80 g/dL    ALT (SGPT) 21 9 - 52 U/L    AST (SGOT) 33 14 - 36 U/L    Alkaline Phosphatase 116 38 - 126 U/L    Total Bilirubin 0.9 0.2 - 1.3 mg/dL    eGFR Non African Amer 69 39 - 90 mL/min/1.73     Globulin 3.3 2.3 - 3.5 gm/dL    A/G Ratio 1.4 1.1 - 1.8 g/dL    BUN/Creatinine Ratio 17.1 7.0 - 25.0    Anion Gap 13.0 5.0 - 15.0 mmol/L   Results for orders placed or performed in visit on 05/23/18   CBC Auto Differential   Result Value Ref Range    WBC 10.01 (H) 3.20 - 9.80 10*3/mm3    RBC 4.91 3.77 - 5.16 10*6/mm3    Hemoglobin 13.8 12.0 - 15.5 g/dL    Hematocrit 42.2 35.0 - 45.0 %    MCV 85.9 80.0 - 98.0 fL    MCH 28.1 26.5 - 34.0 pg    MCHC 32.7 31.4 - 36.0 g/dL    RDW 15.2 (H) 11.5 - 14.5 %    RDW-SD 48.1 (H) 36.4 - 46.3 fl    MPV 9.6 8.0 - 12.0 fL    Platelets 345 150 - 450 10*3/mm3    Neutrophil % 48.8 37.0 - 80.0 %    Lymphocyte % 38.5 10.0 - 50.0 %    Monocyte % 6.9 0.0 - 12.0 %    Eosinophil % 5.0 0.0 - 7.0 %    Basophil % 0.5 0.0 - 2.0 %    Immature Grans % 0.3 0.0 - 0.5 %    Neutrophils, Absolute 4.89 2.00 - 8.60 10*3/mm3    Lymphocytes, Absolute 3.85 0.60 - 4.20 10*3/mm3    Monocytes, Absolute 0.69 0.00 - 0.90 10*3/mm3    Eosinophils, Absolute 0.50 0.00 - 0.70 10*3/mm3    Basophils, Absolute 0.05 0.00 - 0.20 10*3/mm3    Immature Grans, Absolute 0.03 (H) 0.00 - 0.02 10*3/mm3     *Note: Due to a large number of results and/or encounters for the requested time period, some results have not been displayed. A complete set of results can be found in Results Review.

## 2019-11-26 ENCOUNTER — OFFICE VISIT (OUTPATIENT)
Dept: SURGERY | Facility: CLINIC | Age: 74
End: 2019-11-26

## 2019-11-26 VITALS
WEIGHT: 194.4 LBS | TEMPERATURE: 97.7 F | HEIGHT: 64 IN | BODY MASS INDEX: 33.19 KG/M2 | SYSTOLIC BLOOD PRESSURE: 126 MMHG | DIASTOLIC BLOOD PRESSURE: 72 MMHG | HEART RATE: 87 BPM

## 2019-11-26 DIAGNOSIS — D37.4 VILLOUS ADENOMA OF COLON: Primary | ICD-10-CM

## 2019-11-26 PROCEDURE — 99211 OFF/OP EST MAY X REQ PHY/QHP: CPT | Performed by: SURGERY

## 2019-12-02 ENCOUNTER — TELEPHONE (OUTPATIENT)
Dept: FAMILY MEDICINE CLINIC | Facility: CLINIC | Age: 74
End: 2019-12-02

## 2019-12-04 ENCOUNTER — APPOINTMENT (OUTPATIENT)
Dept: LAB | Facility: HOSPITAL | Age: 74
End: 2019-12-04

## 2019-12-04 ENCOUNTER — OFFICE VISIT (OUTPATIENT)
Dept: FAMILY MEDICINE CLINIC | Facility: CLINIC | Age: 74
End: 2019-12-04

## 2019-12-04 VITALS
HEART RATE: 72 BPM | SYSTOLIC BLOOD PRESSURE: 130 MMHG | DIASTOLIC BLOOD PRESSURE: 78 MMHG | BODY MASS INDEX: 33.46 KG/M2 | HEIGHT: 64 IN | OXYGEN SATURATION: 98 % | WEIGHT: 196 LBS

## 2019-12-04 DIAGNOSIS — E66.09 CLASS 1 OBESITY DUE TO EXCESS CALORIES WITH SERIOUS COMORBIDITY AND BODY MASS INDEX (BMI) OF 33.0 TO 33.9 IN ADULT: ICD-10-CM

## 2019-12-04 DIAGNOSIS — Z00.00 MEDICARE ANNUAL WELLNESS VISIT, SUBSEQUENT: ICD-10-CM

## 2019-12-04 DIAGNOSIS — E53.8 COBALAMIN DEFICIENCY: ICD-10-CM

## 2019-12-04 DIAGNOSIS — E78.00 PURE HYPERCHOLESTEROLEMIA: ICD-10-CM

## 2019-12-04 DIAGNOSIS — E03.9 ACQUIRED HYPOTHYROIDISM: ICD-10-CM

## 2019-12-04 DIAGNOSIS — E11.41 TYPE 2 DIABETES MELLITUS WITH DIABETIC MONONEUROPATHY, WITHOUT LONG-TERM CURRENT USE OF INSULIN (HCC): ICD-10-CM

## 2019-12-04 DIAGNOSIS — E11.42 DIABETIC PERIPHERAL NEUROPATHY (HCC): ICD-10-CM

## 2019-12-04 DIAGNOSIS — I10 ESSENTIAL HYPERTENSION: Primary | ICD-10-CM

## 2019-12-04 LAB
ALBUMIN SERPL-MCNC: 4.6 G/DL (ref 3.5–5.2)
ALBUMIN UR-MCNC: <1.2 MG/DL
ALBUMIN/GLOB SERPL: 1.4 G/DL
ALP SERPL-CCNC: 100 U/L (ref 39–117)
ALT SERPL W P-5'-P-CCNC: 16 U/L (ref 1–33)
ANION GAP SERPL CALCULATED.3IONS-SCNC: 15.1 MMOL/L (ref 5–15)
AST SERPL-CCNC: 15 U/L (ref 1–32)
BASOPHILS # BLD AUTO: 0.07 10*3/MM3 (ref 0–0.2)
BASOPHILS NFR BLD AUTO: 0.7 % (ref 0–1.5)
BILIRUB SERPL-MCNC: 0.4 MG/DL (ref 0.2–1.2)
BUN BLD-MCNC: 10 MG/DL (ref 8–23)
BUN/CREAT SERPL: 13.2 (ref 7–25)
CALCIUM SPEC-SCNC: 9.4 MG/DL (ref 8.6–10.5)
CHLORIDE SERPL-SCNC: 99 MMOL/L (ref 98–107)
CHOLEST SERPL-MCNC: 170 MG/DL (ref 0–200)
CO2 SERPL-SCNC: 23.9 MMOL/L (ref 22–29)
CREAT BLD-MCNC: 0.76 MG/DL (ref 0.57–1)
CREAT UR-MCNC: 42.5 MG/DL
DEPRECATED RDW RBC AUTO: 43.3 FL (ref 37–54)
EOSINOPHIL # BLD AUTO: 0.33 10*3/MM3 (ref 0–0.4)
EOSINOPHIL NFR BLD AUTO: 3.3 % (ref 0.3–6.2)
ERYTHROCYTE [DISTWIDTH] IN BLOOD BY AUTOMATED COUNT: 14.7 % (ref 12.3–15.4)
GFR SERPL CREATININE-BSD FRML MDRD: 75 ML/MIN/1.73
GLOBULIN UR ELPH-MCNC: 3.4 GM/DL
GLUCOSE BLD-MCNC: 116 MG/DL (ref 65–99)
HBA1C MFR BLD: 6.4 % (ref 4.8–5.6)
HCT VFR BLD AUTO: 39.9 % (ref 34–46.6)
HDLC SERPL-MCNC: 64 MG/DL (ref 40–60)
HGB BLD-MCNC: 13.4 G/DL (ref 12–15.9)
IMM GRANULOCYTES # BLD AUTO: 0.03 10*3/MM3 (ref 0–0.05)
IMM GRANULOCYTES NFR BLD AUTO: 0.3 % (ref 0–0.5)
LDLC SERPL CALC-MCNC: 65 MG/DL (ref 0–100)
LDLC/HDLC SERPL: 1.02 {RATIO}
LYMPHOCYTES # BLD AUTO: 3.53 10*3/MM3 (ref 0.7–3.1)
LYMPHOCYTES NFR BLD AUTO: 35.3 % (ref 19.6–45.3)
MCH RBC QN AUTO: 27.2 PG (ref 26.6–33)
MCHC RBC AUTO-ENTMCNC: 33.6 G/DL (ref 31.5–35.7)
MCV RBC AUTO: 81.1 FL (ref 79–97)
MICROALBUMIN/CREAT UR: NORMAL MG/G{CREAT}
MONOCYTES # BLD AUTO: 0.56 10*3/MM3 (ref 0.1–0.9)
MONOCYTES NFR BLD AUTO: 5.6 % (ref 5–12)
NEUTROPHILS # BLD AUTO: 5.48 10*3/MM3 (ref 1.7–7)
NEUTROPHILS NFR BLD AUTO: 54.8 % (ref 42.7–76)
NRBC BLD AUTO-RTO: 0 /100 WBC (ref 0–0.2)
PLATELET # BLD AUTO: 417 10*3/MM3 (ref 140–450)
PMV BLD AUTO: 9.6 FL (ref 6–12)
POTASSIUM BLD-SCNC: 4 MMOL/L (ref 3.5–5.2)
PROT SERPL-MCNC: 8 G/DL (ref 6–8.5)
RBC # BLD AUTO: 4.92 10*6/MM3 (ref 3.77–5.28)
SODIUM BLD-SCNC: 138 MMOL/L (ref 136–145)
T4 FREE SERPL-MCNC: 1.56 NG/DL (ref 0.93–1.7)
TRIGL SERPL-MCNC: 205 MG/DL (ref 0–150)
TSH SERPL DL<=0.05 MIU/L-ACNC: 3.33 UIU/ML (ref 0.27–4.2)
VIT B12 BLD-MCNC: 1322 PG/ML (ref 211–946)
VLDLC SERPL-MCNC: 41 MG/DL (ref 5–40)
WBC NRBC COR # BLD: 10 10*3/MM3 (ref 3.4–10.8)

## 2019-12-04 PROCEDURE — 80053 COMPREHEN METABOLIC PANEL: CPT | Performed by: FAMILY MEDICINE

## 2019-12-04 PROCEDURE — 99214 OFFICE O/P EST MOD 30 MIN: CPT | Performed by: FAMILY MEDICINE

## 2019-12-04 PROCEDURE — G0439 PPPS, SUBSEQ VISIT: HCPCS | Performed by: FAMILY MEDICINE

## 2019-12-04 PROCEDURE — 85025 COMPLETE CBC W/AUTO DIFF WBC: CPT | Performed by: FAMILY MEDICINE

## 2019-12-04 PROCEDURE — 82607 VITAMIN B-12: CPT | Performed by: FAMILY MEDICINE

## 2019-12-04 PROCEDURE — 83036 HEMOGLOBIN GLYCOSYLATED A1C: CPT | Performed by: FAMILY MEDICINE

## 2019-12-04 PROCEDURE — 84443 ASSAY THYROID STIM HORMONE: CPT | Performed by: FAMILY MEDICINE

## 2019-12-04 PROCEDURE — 84439 ASSAY OF FREE THYROXINE: CPT | Performed by: FAMILY MEDICINE

## 2019-12-04 PROCEDURE — 36415 COLL VENOUS BLD VENIPUNCTURE: CPT | Performed by: FAMILY MEDICINE

## 2019-12-04 PROCEDURE — 82043 UR ALBUMIN QUANTITATIVE: CPT | Performed by: FAMILY MEDICINE

## 2019-12-04 PROCEDURE — 82570 ASSAY OF URINE CREATININE: CPT | Performed by: FAMILY MEDICINE

## 2019-12-04 PROCEDURE — 80061 LIPID PANEL: CPT | Performed by: FAMILY MEDICINE

## 2019-12-04 NOTE — PROGRESS NOTES
"      Visit Vitals  /78   Pulse 72   Ht 162.6 cm (64\")   Wt 88.9 kg (196 lb)   LMP  (LMP Unknown)   SpO2 98%   BMI 33.64 kg/m²       Body mass index is 33.64 kg/m².            This document has been electronically signed by Jeffry Irby MD on December 4, 2019 9:15 AM    "

## 2019-12-04 NOTE — PATIENT INSTRUCTIONS

## 2019-12-04 NOTE — PROGRESS NOTES
The ABCs of the Annual Wellness Visit  Subsequent Medicare Wellness Visit    Chief Complaint   Patient presents with   • Annual Exam     MWV       Subjective   History of Present Illness:  Sharon Esposito is a 74 y.o. female who presents for a Subsequent Medicare Wellness Visit.    HEALTH RISK ASSESSMENT    Recent Hospitalizations:  No hospitalization(s) within the last year.    Current Medical Providers:  Patient Care Team:  Jeffry Irby MD as PCP - General  Jeffry Irby MD as PCP - Claims Attributed  Laura Robles APRN (Gastroenterology)  Ozzy Young MD as Surgeon (General Surgery)    Smoking Status:  Social History     Tobacco Use   Smoking Status Never Smoker   Smokeless Tobacco Never Used       Alcohol Consumption:  Social History     Substance and Sexual Activity   Alcohol Use No       Depression Screen:   PHQ-2/PHQ-9 Depression Screening 12/4/2019   Little interest or pleasure in doing things 0   Feeling down, depressed, or hopeless 0   Trouble falling or staying asleep, or sleeping too much 0   Feeling tired or having little energy 0   Poor appetite or overeating 0   Feeling bad about yourself - or that you are a failure or have let yourself or your family down 0   Trouble concentrating on things, such as reading the newspaper or watching television 0   Moving or speaking so slowly that other people could have noticed. Or the opposite - being so fidgety or restless that you have been moving around a lot more than usual 0   Thoughts that you would be better off dead, or of hurting yourself in some way 0   Total Score 0   If you checked off any problems, how difficult have these problems made it for you to do your work, take care of things at home, or get along with other people? Not difficult at all       Fall Risk Screen:  STEADI Fall Risk Assessment has not been completed.    Health Habits and Functional and Cognitive Screening:  Functional & Cognitive Status 12/4/2019   Do you have  difficulty preparing food and eating? No   Do you have difficulty bathing yourself, getting dressed or grooming yourself? No   Do you have difficulty using the toilet? No   Do you have difficulty moving around from place to place? No   Do you have trouble with steps or getting out of a bed or a chair? No   Current Diet Well Balanced Diet   Dental Exam Up to date   Eye Exam Up to date   Exercise (times per week) 5 times per week   Current Exercise Activities Include Walking   Do you need help using the phone?  No   Are you deaf or do you have serious difficulty hearing?  No   Do you need help with transportation? No   Do you need help shopping? No   Do you need help preparing meals?  No   Do you need help with housework?  No   Do you need help with laundry? No   Do you need help taking your medications? No   Do you need help managing money? No   Do you ever drive or ride in a car without wearing a seat belt? No   Have you felt unusual stress, anger or loneliness in the last month? No   Who do you live with? Alone   If you need help, do you have trouble finding someone available to you? No   Have you been bothered in the last four weeks by sexual problems? No   Do you have difficulty concentrating, remembering or making decisions? No         Does the patient have evidence of cognitive impairment? No    Asprin use counseling:Does not need ASA (and currently is not on it)    Age-appropriate Screening Schedule:  Refer to the list below for future screening recommendations based on patient's age, sex and/or medical conditions. Orders for these recommended tests are listed in the plan section. The patient has been provided with a written plan.    Health Maintenance   Topic Date Due   • ZOSTER VACCINE (2 of 2) 01/17/2017   • HEMOGLOBIN A1C  11/29/2019   • DIABETIC EYE EXAM  01/28/2020   • DIABETIC FOOT EXAM  05/29/2020   • LIPID PANEL  05/29/2020   • URINE MICROALBUMIN  05/29/2020   • MAMMOGRAM  04/09/2021   • DXA SCAN   06/05/2021   • TDAP/TD VACCINES (2 - Td) 09/19/2021   • COLONOSCOPY  11/13/2029   • INFLUENZA VACCINE  Addressed   • PNEUMOCOCCAL VACCINES (65+ LOW/MEDIUM RISK)  Discontinued          The following portions of the patient's history were reviewed and updated as appropriate: allergies, current medications, past family history, past medical history, past social history, past surgical history and problem list.    Outpatient Medications Prior to Visit   Medication Sig Dispense Refill   • atorvastatin (LIPITOR) 40 MG tablet Take 1 tablet by mouth Daily. 90 tablet 2   • Blood Glucose Monitoring Suppl (ONE TOUCH ULTRA SYSTEM KIT) W/DEVICE kit Use for Home Glucose Monitoring 1 each 0   • Cyanocobalamin (VITAMIN B-12) 1000 MCG sublingual tablet Place 1 tablet under your tongue daily for the rest of your life. 100 each 4   • fluticasone (FLONASE) 50 MCG/ACT nasal spray 2 sprays into the nostril(s) as directed by provider Daily. 3 bottle 3   • glucose blood (ONE TOUCH ULTRA TEST) test strip Test Blood Sugars Once daily 100 each 4   • levothyroxine (SYNTHROID, LEVOTHROID) 88 MCG tablet Take 1 tablet by mouth Daily. 90 tablet 2   • Loratadine 10 MG capsule Take  by mouth.     • losartan-hydrochlorothiazide (HYZAAR) 100-25 MG per tablet Take 1 tablet by mouth Daily. 90 tablet 2   • metFORMIN ER (GLUCOPHAGE-XR) 500 MG 24 hr tablet Take 2 tablets by mouth Daily Before Supper. 180 tablet 3   • metoprolol tartrate (LOPRESSOR) 25 MG tablet TAKE 1/2 TABLET BY MOUTH EVERY 12 (TWELVE) HOURS. 15 tablet 0   • ONE TOUCH ULTRA TEST test strip TEST BLOOD SUGARS ONCE DAILY 100 each 3   • ONETOUCH DELICA LANCETS 33G misc USE TO TEST BLOOD SUGAR ONCE DAILY 100 each 3   • ONETOUCH DELICA LANCETS FINE misc Test Blood Sugars Once Daily 100 each 4   • vitamin C (ASCORBIC ACID) 500 MG tablet Take 500 mg by mouth Daily.       No facility-administered medications prior to visit.        Patient Active Problem List   Diagnosis   • Need for hepatitis C  "screening test   • Hyperlipidemia   • Acquired hypothyroidism   • Seasonal allergies   • Osteopenia   • Essential hypertension   • Edema   • Diverticulitis of colon   • Allergic rhinitis   • Postmenopausal   • Ingrown toenail   • Paresthesia of both feet   • Type 2 diabetes mellitus without complication, without long-term current use of insulin (CMS/[facility])   • Cobalamin deficiency   • Diabetic peripheral neuropathy (CMS/[facility])   • Encounter for screening for malignant neoplasm of colon   • Villous adenoma of colon 08/2019       Advanced Care Planning:  Patient does not have an advance directive - information provided to the patient today    Review of Systems    Compared to one year ago, the patient feels her physical health is the same.  Compared to one year ago, the patient feels her mental health is the same.    Reviewed chart for potential of high risk medication in the elderly: yes  Reviewed chart for potential of harmful drug interactions in the elderly:yes    Objective         Vitals:    12/04/19 0901   BP: 130/78   Pulse: 72   SpO2: 98%   Weight: 88.9 kg (196 lb)   Height: 162.6 cm (64\")   PainSc: 0-No pain       Body mass index is 33.64 kg/m².  Discussed the patient's BMI with her. The BMI is above average; BMI management plan is completed.    Physical Exam          Assessment/Plan   Medicare Risks and Personalized Health Plan  CMS Preventative Services Quick Reference  Advance Directive Discussion    The above risks/problems have been discussed with the patient.  Pertinent information has been shared with the patient in the After Visit Summary.  Follow up plans and orders are seen below in the Assessment/Plan Section.    There are no diagnoses linked to this encounter.  Follow Up:  No Follow-up on file.     An After Visit Summary and PPPS were given to the patient.             "

## 2019-12-04 NOTE — PROGRESS NOTES
Subjective   Sharon Esposito is a 74 y.o. female.     Hyperlipidemia   This is a chronic problem. The current episode started more than 1 year ago. The problem is controlled. Recent lipid tests were reviewed and are normal. Exacerbating diseases include hypothyroidism and obesity. Pertinent negatives include no chest pain, myalgias or shortness of breath.   Hypertension   This is a chronic problem. The current episode started more than 1 year ago. The problem is unchanged. The problem is controlled (pt checks occisionally at [facility] pharmacy, runs [id number]). Pertinent negatives include no chest pain, orthopnea, palpitations, peripheral edema, PND or shortness of breath.   Diabetes   She presents for her initial diabetic visit. She has type 2 diabetes mellitus. Pertinent negatives for diabetes include no chest pain, no foot paresthesias, no foot ulcerations and no polyuria. Her breakfast blood glucose is taken between 8-9 am. Her breakfast blood glucose range is generally 110-130 mg/dl. An ACE inhibitor/angiotensin II receptor blocker is being taken.   Hypothyroidism   This is a chronic problem. The current episode started more than 1 year ago. The problem has been unchanged. Pertinent negatives include no chest pain or myalgias.   Obesity   This is a chronic problem. The current episode started more than 1 year ago. The problem occurs constantly. The problem has been gradually worsening. Pertinent negatives include no chest pain or myalgias.        The following portions of the patient's history were reviewed and updated as appropriate: allergies, current medications, past family history, past medical history, past social history, past surgical history and problem list.    Review of Systems   Respiratory: Negative for shortness of breath.    Cardiovascular: Negative for chest pain, palpitations, orthopnea and PND.   Endocrine: Negative for polyuria.   Musculoskeletal: Negative for myalgias.       Objective    "  Physical Exam   Constitutional: She is oriented to person, place, and time. She appears well-developed and well-nourished. No distress.   HENT:   Head: Normocephalic and atraumatic.   Cardiovascular: Normal rate, regular rhythm, normal heart sounds and intact distal pulses. Exam reveals no gallop and no friction rub.   No murmur heard.  Pulmonary/Chest: Effort normal and breath sounds normal. No respiratory distress. She has no wheezes. She has no rales. She exhibits no tenderness.   Musculoskeletal: She exhibits no edema.    Sharon had a diabetic foot exam performed (callus noted) today.   During the foot exam she had a monofilament test performed (decreased).  Vascular Status -  Her right foot exhibits no edema. Her left foot exhibits no edema.  Neurological: She is alert and oriented to person, place, and time.   Skin: Skin is warm and dry. She is not diaphoretic.   Psychiatric: She has a normal mood and affect. Her behavior is normal. Judgment and thought content normal.   Nursing note and vitals reviewed.      Assessment/Plan   Problems Addressed this Visit     None                     Visit Vitals  /78   Pulse 72   Ht 162.6 cm (64\")   Wt 88.9 kg (196 lb)   LMP  (LMP Unknown)   SpO2 98%   BMI 33.64 kg/m²       Body mass index is 33.64 kg/m².            This document has been electronically signed by Jeffry Irby MD on December 4, 2019 9:15 AM    "

## 2019-12-05 ENCOUNTER — TELEPHONE (OUTPATIENT)
Dept: FAMILY MEDICINE CLINIC | Facility: CLINIC | Age: 74
End: 2019-12-05

## 2019-12-05 NOTE — PROGRESS NOTES
Per Dr. Irby, Ms. Esposito has been called with recent lab results & recommendations.  Continue current medications and follow-up as planned or sooner if any problems.

## 2019-12-05 NOTE — TELEPHONE ENCOUNTER
Per Dr. Irby, Ms. Esposito has been called with recent lab results & recommendations.  Continue current medications and follow-up as planned or sooner if any problems.      ----- Message from Jeffry Irby MD sent at 12/5/2019  1:41 PM CST -----  Ok, call or send card.

## 2019-12-11 ENCOUNTER — PREP FOR SURGERY (OUTPATIENT)
Dept: OTHER | Facility: HOSPITAL | Age: 74
End: 2019-12-11

## 2019-12-11 ENCOUNTER — OFFICE VISIT (OUTPATIENT)
Dept: SURGERY | Facility: CLINIC | Age: 74
End: 2019-12-11

## 2019-12-11 VITALS
HEART RATE: 80 BPM | SYSTOLIC BLOOD PRESSURE: 142 MMHG | BODY MASS INDEX: 33.16 KG/M2 | HEIGHT: 64 IN | TEMPERATURE: 97.8 F | DIASTOLIC BLOOD PRESSURE: 70 MMHG | WEIGHT: 194.2 LBS

## 2019-12-11 DIAGNOSIS — D36.9 ADENOMATOUS POLYP: Primary | ICD-10-CM

## 2019-12-11 PROCEDURE — 99214 OFFICE O/P EST MOD 30 MIN: CPT | Performed by: SURGERY

## 2019-12-11 RX ORDER — METRONIDAZOLE 500 MG/1
TABLET ORAL
Qty: 4 TABLET | Refills: 0 | Status: SHIPPED | OUTPATIENT
Start: 2019-12-11 | End: 2020-01-13 | Stop reason: HOSPADM

## 2019-12-11 RX ORDER — ERYTHROMYCIN 500 MG/1
500 TABLET, COATED ORAL 2 TIMES DAILY
Qty: 4 TABLET | Refills: 0 | Status: SHIPPED | OUTPATIENT
Start: 2019-12-11 | End: 2019-12-12

## 2019-12-11 RX ORDER — ACETAMINOPHEN 325 MG/1
650 TABLET ORAL ONCE
Status: CANCELLED | OUTPATIENT
Start: 2020-01-09 | End: 2019-12-11

## 2019-12-11 NOTE — PATIENT INSTRUCTIONS

## 2019-12-11 NOTE — PROGRESS NOTES
74-year-old female returns to be set up for surgery.  Refer to my previous note.  She underwent actually a couple colonoscopies by the GI service here Dr. Curiel.  She was found to have a villous adenoma in her a sending colon and subsequent attempt at this was found to be a serrated adenoma thought to be in the ascending colon.  Polyp was marked with Delaney ink opposite the polyp.  There were unable to remove it so patient was referred for possible surgical resection.  Patient was also noted to have diverticulosis.    Vitals:    12/11/19 0933   BP: 142/70   Pulse: 80   Temp: 97.8 °F (36.6 °C)       Allergies:   Allergies   Allergen Reactions   • Codeine GI Intolerance   • Latex Rash   • Neosporin [Neomycin-Polymyxin-Gramicidin] Rash       Home Medications:  Prior to Admission medications    Medication Sig Start Date End Date Taking? Authorizing Provider   atorvastatin (LIPITOR) 40 MG tablet Take 1 tablet by mouth Daily. 3/15/19  Yes Jeffry Irby MD   Blood Glucose Monitoring Suppl (ONE TOUCH ULTRA SYSTEM KIT) W/DEVICE kit Use for Home Glucose Monitoring 4/21/17  Yes Jeffry Irby MD   Cyanocobalamin (VITAMIN B-12) 1000 MCG sublingual tablet Place 1 tablet under your tongue daily for the rest of your life. 2/20/19  Yes Jeffry Irby MD   fluticasone (FLONASE) 50 MCG/ACT nasal spray 2 sprays into the nostril(s) as directed by provider Daily. 1/8/19  Yes Sonido Moyer MD   glucose blood (ONE TOUCH ULTRA TEST) test strip Test Blood Sugars Once daily 5/22/17  Yes Jeffry Irby MD   levothyroxine (SYNTHROID, LEVOTHROID) 88 MCG tablet Take 1 tablet by mouth Daily. 3/15/19  Yes Jeffry Irby MD   Loratadine 10 MG capsule Take  by mouth.   Yes Lesly Magana MD   losartan-hydrochlorothiazide (HYZAAR) 100-25 MG per tablet Take 1 tablet by mouth Daily. 3/15/19  Yes Jeffry Irby MD   metFORMIN ER (GLUCOPHAGE-XR) 500 MG 24 hr tablet Take 2 tablets by mouth Daily Before Supper. 2/20/19   Yes Jeffry Irby MD   metoprolol tartrate (LOPRESSOR) 25 MG tablet TAKE 1/2 TABLET BY MOUTH EVERY 12 (TWELVE) HOURS. 3/11/19  Yes Jeffry Irby MD   ONE TOUCH ULTRA TEST test strip TEST BLOOD SUGARS ONCE DAILY 7/19/19  Yes Jeffry Irby MD ONETOUCH DELICA LANCETS 33G misc USE TO TEST BLOOD SUGAR ONCE DAILY 7/19/19  Yes Jeffry Irby MD ONETOSKINNY DELICA LANCETS FINE misc Test Blood Sugars Once Daily 5/22/17  Yes Jeffry Irby MD   vitamin C (ASCORBIC ACID) 500 MG tablet Take 500 mg by mouth Daily.   Yes Lesly Magana MD   erythromycin base (E-MYCIN) 500 MG tablet Take 1 tablet by mouth 2 (Two) Times a Day for 2 doses. Take at 9 and 10 pm night before surgery 12/11/19 12/12/19  Ozzy Young MD   metroNIDAZOLE (FLAGYL) 500 MG tablet Take 2 tabs at 9 pm and 2 tabs at 11pm night before surgery 12/11/19   Ozzy Young MD       Social History     Socioeconomic History   • Marital status: Single     Spouse name: Not on file   • Number of children: Not on file   • Years of education: Not on file   • Highest education level: Not on file   Tobacco Use   • Smoking status: Never Smoker   • Smokeless tobacco: Never Used   Substance and Sexual Activity   • Alcohol use: No   • Drug use: No   • Sexual activity: Defer       Past Medical History:   Diagnosis Date   • Acquired hypothyroidism     with hashimoto's thyroiditis      • Acute bronchitis    • Acute sinusitis    • Allergic rhinitis    • Bilateral hand pain    • Cough    • Diabetes mellitus (CMS/HCC)    • Diverticulitis of colon    • Edema     idiopathic state, history of      • Encounter for gynecological examination (general) (routine) without abnormal findings    • Encounter for screening for osteoporosis    • Essential hypertension    • Hemorrhoids     internal & external      • Hx of bone density study     DEXA BONE DENSITY APPENDICULAR: osteopenia, 12/18/2013   • Hx of screening mammography     MAMMOGRAM SCREENING: x2,  02/26/2014   • Hyperlipidemia     with elevated low density lipoprotein   • 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     history   • Infection of skin and subcutaneous tissue     Infection of skin AND/OR subcutaneous tissue      • Onychogryposis     recurrent ingrown toenail      • Open wound of face    • Osteopenia    • Screening for malignant neoplasm of breast    • Screening for osteoporosis    • Seasonal allergies     Seasonal allergy      • Vitamin deficiency        Family History   Problem Relation Age of Onset   • Breast cancer Mother    • Cancer Mother 60        Breast   • Diabetes Mother    • Coronary artery disease Father    • Heart disease Father    • Hypertension Father    • Breast cancer Other    • Coronary artery disease Other    • Heart disease Other        Past Surgical History:   Procedure Laterality Date   • CATARACT EXTRACTION, BILATERAL     • COLONOSCOPY      Approximately 04/2009   • COLONOSCOPY N/A 8/14/2019    Procedure: COLONOSCOPY Monday or Wed;  Surgeon: Tony Curiel MD;  Location: [facility] ENDOSCOPY;  Service: Gastroenterology   • COLONOSCOPY N/A 11/13/2019    Procedure: COLONOSCOPY A Wed in Nov;  Surgeon: Tony Curiel MD;  Location: [facility] ENDOSCOPY;  Service: Gastroenterology   • ENDOSCOPY AND COLONOSCOPY  04/20/2009    A few small diverticula in the sigmoid & the descending colon. Small internal & external hemorrhoids were present. Colonoscopy otherwise normal   • INJECTION OF MEDICATION  06/23/2015    Celestone (betamethasone) x2   • INJECTION OF MEDICATION  12/08/2014    Toradol    • PAP SMEAR  06/23/2009    Negative for intraepithelial lesion or malignancy   • SKIN BIOPSY  09/30/2010    L neck lesion- seborrheic keratosis. right infraorbital-seborrheic keratosis, right lateral orbital- intradermal nevus. forehead lesion-early squamous papilloma       Review of systems  Denies chest pain  Denies shortness of breath  Denies anticoagulation  Denies  history of blood clots  Denies any abdominal pain nausea or vomiting  Denies any urinary complaints  No fever no chills      Alert and appropriate  Nonicteric  Nontoxic  Neck with any obvious masses  Lungs clear  Heart regular rate and rhythm  Abdomen soft without any obvious masses or hernias  Skin warm and dry  Extremities unremarkable        Assessment and plan  Adenomatous polyp right colon unable to be removed endoscopically.  I would recommend setting this patient up for a laparoscopic right colectomy.  Prior to proceeding with the surgery well patient's asleep I would recommend a colonoscopy to identify the site and make sure this is in the right colon and not the transverse colon we can take an x-ray of her abdomen with the scope in place to assist with that.  The Delaney ink this been injected will assist with identifying the polyp as well.  I fully discussed laparoscopic right colectomy as well as colonoscopy with the patient.  She clearly understands he may have to do an open procedure and what that entails as well.  She understands the prep this involved mechanically as well as antibiotic and she understands the alternatives risk and benefits and wishes to proceed

## 2020-01-02 RX ORDER — FLUTICASONE PROPIONATE 50 MCG
2 SPRAY, SUSPENSION (ML) NASAL DAILY
Qty: 3 BOTTLE | Refills: 3 | Status: SHIPPED | OUTPATIENT
Start: 2020-01-02 | End: 2020-12-18

## 2020-01-03 ENCOUNTER — APPOINTMENT (OUTPATIENT)
Dept: PREADMISSION TESTING | Facility: HOSPITAL | Age: 75
End: 2020-01-03

## 2020-01-03 VITALS
WEIGHT: 196 LBS | DIASTOLIC BLOOD PRESSURE: 78 MMHG | BODY MASS INDEX: 33.46 KG/M2 | SYSTOLIC BLOOD PRESSURE: 180 MMHG | HEIGHT: 64 IN | RESPIRATION RATE: 12 BRPM | HEART RATE: 76 BPM | OXYGEN SATURATION: 96 %

## 2020-01-03 LAB
ABO GROUP BLD: NORMAL
ANION GAP SERPL CALCULATED.3IONS-SCNC: 12 MMOL/L (ref 5–15)
BLD GP AB SCN SERPL QL: NEGATIVE
BUN BLD-MCNC: 15 MG/DL (ref 8–23)
BUN/CREAT SERPL: 19.5 (ref 7–25)
CALCIUM SPEC-SCNC: 9.8 MG/DL (ref 8.6–10.5)
CHLORIDE SERPL-SCNC: 101 MMOL/L (ref 98–107)
CO2 SERPL-SCNC: 27 MMOL/L (ref 22–29)
CREAT BLD-MCNC: 0.77 MG/DL (ref 0.57–1)
DEPRECATED RDW RBC AUTO: 48.7 FL (ref 37–54)
ERYTHROCYTE [DISTWIDTH] IN BLOOD BY AUTOMATED COUNT: 15.7 % (ref 12.3–15.4)
GFR SERPL CREATININE-BSD FRML MDRD: 74 ML/MIN/1.73
GLUCOSE BLD-MCNC: 128 MG/DL (ref 65–99)
HCT VFR BLD AUTO: 40.2 % (ref 34–46.6)
HGB BLD-MCNC: 13.2 G/DL (ref 12–15.9)
Lab: NORMAL
MCH RBC QN AUTO: 28.1 PG (ref 26.6–33)
MCHC RBC AUTO-ENTMCNC: 32.8 G/DL (ref 31.5–35.7)
MCV RBC AUTO: 85.7 FL (ref 79–97)
PLATELET # BLD AUTO: 403 10*3/MM3 (ref 140–450)
PMV BLD AUTO: 9 FL (ref 6–12)
POTASSIUM BLD-SCNC: 4.3 MMOL/L (ref 3.5–5.2)
RBC # BLD AUTO: 4.69 10*6/MM3 (ref 3.77–5.28)
RH BLD: POSITIVE
SODIUM BLD-SCNC: 140 MMOL/L (ref 136–145)
T&S EXPIRATION DATE: NORMAL
WBC NRBC COR # BLD: 10.41 10*3/MM3 (ref 3.4–10.8)

## 2020-01-03 PROCEDURE — 93010 ELECTROCARDIOGRAM REPORT: CPT | Performed by: INTERNAL MEDICINE

## 2020-01-03 PROCEDURE — 86900 BLOOD TYPING SEROLOGIC ABO: CPT | Performed by: ANESTHESIOLOGY

## 2020-01-03 PROCEDURE — 36415 COLL VENOUS BLD VENIPUNCTURE: CPT

## 2020-01-03 PROCEDURE — 86901 BLOOD TYPING SEROLOGIC RH(D): CPT | Performed by: ANESTHESIOLOGY

## 2020-01-03 PROCEDURE — 86850 RBC ANTIBODY SCREEN: CPT | Performed by: ANESTHESIOLOGY

## 2020-01-03 PROCEDURE — 93005 ELECTROCARDIOGRAM TRACING: CPT

## 2020-01-03 PROCEDURE — 80048 BASIC METABOLIC PNL TOTAL CA: CPT | Performed by: ANESTHESIOLOGY

## 2020-01-03 PROCEDURE — 85027 COMPLETE CBC AUTOMATED: CPT | Performed by: ANESTHESIOLOGY

## 2020-01-03 RX ORDER — SODIUM CHLORIDE 9 MG/ML
1000 INJECTION, SOLUTION INTRAVENOUS CONTINUOUS
Status: CANCELLED | OUTPATIENT
Start: 2020-01-09

## 2020-01-03 NOTE — PAT
Called Dr. Agosto read today EKG over the phone pt gives hx of hypertension, no co chest pain, can climb 2 flights of stairs without getting soa, is diabetic, no further orders or instructions may proceed with surgery.  Reviewed Colorectal ERAS sheet in its entirety verbally and answered all questions that patient had and she related that she understood the information, colorectal ERAS sheet given to patient (also reviewed information sheet with patient that she had received from Dr. Young office and answered all questions that she had, patient had already received the guide ERAS pamphlet.)   Chlorhexidine given with written and verbal instructions, patient relates that she understands I information given.  Opioid pain medication pamphlet given.

## 2020-01-03 NOTE — DISCHARGE INSTRUCTIONS
[facility]  Pre-op Information and Guidelines    You will be called after 2 p.m. the day before your surgery (Friday for Monday surgery) and notified of your time for arrival and approximate surgery time.  If you have not received a call by 4P.M., please contact Same Day Surgery at [phone] of if outside [facility] call [phone].    Please Follow these Important Safety Guidelines:    • The morning of your procedure, take only the medications listed below with   A sip of water:_____________________________________________       ______________________________________________    • DO NOT eat or drink anything after 12:00 midnight the night before surgery  Specific instructions concerning drinking clear liquids will be discussed during  the pre-surgery instruction call the day before your surgery.    • If you take a blood thinner (ex. Plavix, Coumadin, aspirin), ask your doctor when to stop it before surgery  STOP DATE: _________________    • Only 2 visitors are allowed in patient rooms at a time  Your visitors will be asked to wait in the lobby until the admission process is complete with the exception of a parent with a child and patients in need of special assistance.    • YOU CANNOT DRIVE YOURSELF HOME  You must be accompanied by someone who will be responsible for driving you home after surgery and for your care at home.    • DO NOT chew gum, use breath mints, hard candy, or smoke the day of surgery  • DO NOT drink alcohol for at least 24 hours before your surgery  • DO NOT wear any jewelry and remove all body piercing before coming to the hospital  • DO NOT wear make-up to the hospital  • If you are having surgery on an extremity (arm/leg/foot) remove nail polish/artificial nails on the surgical side  • Clothing, glasses, contacts, dentures, and hairpieces must be removed before surgery  • Bathe the night before or the morning of your surgery and do not use powders/lotions on  skin.

## 2020-01-08 ENCOUNTER — ANESTHESIA EVENT (OUTPATIENT)
Dept: PERIOP | Facility: HOSPITAL | Age: 75
End: 2020-01-08

## 2020-01-09 ENCOUNTER — HOSPITAL ENCOUNTER (INPATIENT)
Facility: HOSPITAL | Age: 75
LOS: 4 days | Discharge: HOME | End: 2020-01-13
Attending: SURGERY | Admitting: SURGERY

## 2020-01-09 ENCOUNTER — ANESTHESIA (OUTPATIENT)
Dept: PERIOP | Facility: HOSPITAL | Age: 75
End: 2020-01-09

## 2020-01-09 ENCOUNTER — APPOINTMENT (OUTPATIENT)
Dept: GENERAL RADIOLOGY | Facility: HOSPITAL | Age: 75
End: 2020-01-09

## 2020-01-09 DIAGNOSIS — R26.89 DECREASED FUNCTIONAL MOBILITY: ICD-10-CM

## 2020-01-09 DIAGNOSIS — Z78.9 IMPAIRED MOBILITY AND ACTIVITIES OF DAILY LIVING: ICD-10-CM

## 2020-01-09 DIAGNOSIS — Z74.09 IMPAIRED MOBILITY AND ACTIVITIES OF DAILY LIVING: ICD-10-CM

## 2020-01-09 DIAGNOSIS — D36.9 ADENOMATOUS POLYP: ICD-10-CM

## 2020-01-09 LAB — GLUCOSE BLDC GLUCOMTR-MCNC: 127 MG/DL (ref 70–130)

## 2020-01-09 PROCEDURE — 45381 COLONOSCOPY SUBMUCOUS NJX: CPT | Performed by: SURGERY

## 2020-01-09 PROCEDURE — 94799 UNLISTED PULMONARY SVC/PX: CPT

## 2020-01-09 PROCEDURE — 94760 N-INVAS EAR/PLS OXIMETRY 1: CPT

## 2020-01-09 PROCEDURE — 0DTF0ZZ RESECTION OF RIGHT LARGE INTESTINE, OPEN APPROACH: ICD-10-PCS | Performed by: SURGERY

## 2020-01-09 PROCEDURE — 82962 GLUCOSE BLOOD TEST: CPT

## 2020-01-09 PROCEDURE — 25010000002 PHENYLEPHRINE PER 1 ML: Performed by: NURSE ANESTHETIST, CERTIFIED REGISTERED

## 2020-01-09 PROCEDURE — 0DJD8ZZ INSPECTION OF LOWER INTESTINAL TRACT, VIA NATURAL OR ARTIFICIAL OPENING ENDOSCOPIC: ICD-10-PCS | Performed by: SURGERY

## 2020-01-09 PROCEDURE — 88307 TISSUE EXAM BY PATHOLOGIST: CPT | Performed by: SURGERY

## 2020-01-09 PROCEDURE — 74018 RADEX ABDOMEN 1 VIEW: CPT

## 2020-01-09 PROCEDURE — 88309 TISSUE EXAM BY PATHOLOGIST: CPT | Performed by: PATHOLOGY

## 2020-01-09 PROCEDURE — 25010000002 PROPOFOL 10 MG/ML EMULSION: Performed by: NURSE ANESTHETIST, CERTIFIED REGISTERED

## 2020-01-09 PROCEDURE — 25010000002 FENTANYL CITRATE (PF) 100 MCG/2ML SOLUTION: Performed by: NURSE ANESTHETIST, CERTIFIED REGISTERED

## 2020-01-09 PROCEDURE — 25010000002 NEOSTIGMINE 4 MG/4ML SOLUTION PREFILLED SYRINGE: Performed by: NURSE ANESTHETIST, CERTIFIED REGISTERED

## 2020-01-09 PROCEDURE — 44140 PARTIAL REMOVAL OF COLON: CPT | Performed by: SURGERY

## 2020-01-09 PROCEDURE — 25010000002 CEFOXITIN: Performed by: SURGERY

## 2020-01-09 PROCEDURE — 0DJD4ZZ INSPECTION OF LOWER INTESTINAL TRACT, PERCUTANEOUS ENDOSCOPIC APPROACH: ICD-10-PCS | Performed by: SURGERY

## 2020-01-09 PROCEDURE — 25010000002 ONDANSETRON PER 1 MG: Performed by: NURSE ANESTHETIST, CERTIFIED REGISTERED

## 2020-01-09 PROCEDURE — 25010000002 SUCCINYLCHOLINE PER 20 MG: Performed by: NURSE ANESTHETIST, CERTIFIED REGISTERED

## 2020-01-09 PROCEDURE — 25010000003 MORPHINE PER 10 MG: Performed by: SURGERY

## 2020-01-09 PROCEDURE — 25010000002 MIDAZOLAM PER 1 MG: Performed by: NURSE ANESTHETIST, CERTIFIED REGISTERED

## 2020-01-09 PROCEDURE — 25010000002 HYDROMORPHONE PER 4 MG: Performed by: NURSE ANESTHETIST, CERTIFIED REGISTERED

## 2020-01-09 DEVICE — PROXIMATE RELOADABLE LINEAR STAPLER
Type: IMPLANTABLE DEVICE | Site: ABDOMEN | Status: FUNCTIONAL
Brand: PROXIMATE

## 2020-01-09 DEVICE — PROXIMATE RELOADABLE LINEAR CUTTER WITH SAFETY LOCK-OUT, 75MM
Type: IMPLANTABLE DEVICE | Site: ABDOMEN | Status: FUNCTIONAL
Brand: PROXIMATE

## 2020-01-09 DEVICE — PROXIMATE LINEAR CUTTER RELOAD, BLUE, 75MM
Type: IMPLANTABLE DEVICE | Site: ABDOMEN | Status: FUNCTIONAL
Brand: PROXIMATE

## 2020-01-09 RX ORDER — EPHEDRINE SULFATE 50 MG/ML
INJECTION, SOLUTION INTRAVENOUS AS NEEDED
Status: DISCONTINUED | OUTPATIENT
Start: 2020-01-09 | End: 2020-01-09 | Stop reason: SURG

## 2020-01-09 RX ORDER — ACETAMINOPHEN 325 MG/1
650 TABLET ORAL ONCE
Status: COMPLETED | OUTPATIENT
Start: 2020-01-09 | End: 2020-01-09

## 2020-01-09 RX ORDER — PROMETHAZINE HYDROCHLORIDE 25 MG/ML
12.5 INJECTION, SOLUTION INTRAMUSCULAR; INTRAVENOUS ONCE AS NEEDED
Status: DISCONTINUED | OUTPATIENT
Start: 2020-01-09 | End: 2020-01-09 | Stop reason: HOSPADM

## 2020-01-09 RX ORDER — MORPHINE SULFATE 1 MG/ML
INJECTION INTRAVENOUS CONTINUOUS
Status: DISCONTINUED | OUTPATIENT
Start: 2020-01-09 | End: 2020-01-09

## 2020-01-09 RX ORDER — PROMETHAZINE HYDROCHLORIDE 25 MG/1
25 SUPPOSITORY RECTAL ONCE AS NEEDED
Status: DISCONTINUED | OUTPATIENT
Start: 2020-01-09 | End: 2020-01-09 | Stop reason: HOSPADM

## 2020-01-09 RX ORDER — NALOXONE HCL 0.4 MG/ML
0.1 VIAL (ML) INJECTION
Status: DISCONTINUED | OUTPATIENT
Start: 2020-01-09 | End: 2020-01-13 | Stop reason: HOSPADM

## 2020-01-09 RX ORDER — ONDANSETRON 2 MG/ML
INJECTION INTRAMUSCULAR; INTRAVENOUS AS NEEDED
Status: DISCONTINUED | OUTPATIENT
Start: 2020-01-09 | End: 2020-01-09 | Stop reason: SURG

## 2020-01-09 RX ORDER — DEXTROSE, SODIUM CHLORIDE, AND POTASSIUM CHLORIDE 5; .45; .15 G/100ML; G/100ML; G/100ML
100 INJECTION INTRAVENOUS CONTINUOUS
Status: DISCONTINUED | OUTPATIENT
Start: 2020-01-09 | End: 2020-01-10 | Stop reason: SDUPTHER

## 2020-01-09 RX ORDER — ONDANSETRON 2 MG/ML
4 INJECTION INTRAMUSCULAR; INTRAVENOUS EVERY 6 HOURS PRN
Status: DISCONTINUED | OUTPATIENT
Start: 2020-01-09 | End: 2020-01-13 | Stop reason: HOSPADM

## 2020-01-09 RX ORDER — FAMOTIDINE 20 MG/1
20 TABLET, FILM COATED ORAL 2 TIMES DAILY
Status: DISCONTINUED | OUTPATIENT
Start: 2020-01-09 | End: 2020-01-09

## 2020-01-09 RX ORDER — EPHEDRINE SULFATE 50 MG/ML
5 INJECTION, SOLUTION INTRAVENOUS ONCE AS NEEDED
Status: DISCONTINUED | OUTPATIENT
Start: 2020-01-09 | End: 2020-01-09 | Stop reason: HOSPADM

## 2020-01-09 RX ORDER — SUCCINYLCHOLINE CHLORIDE 20 MG/ML
INJECTION INTRAMUSCULAR; INTRAVENOUS AS NEEDED
Status: DISCONTINUED | OUTPATIENT
Start: 2020-01-09 | End: 2020-01-09 | Stop reason: SURG

## 2020-01-09 RX ORDER — BUPIVACAINE HYDROCHLORIDE 5 MG/ML
INJECTION, SOLUTION EPIDURAL; INTRACAUDAL AS NEEDED
Status: DISCONTINUED | OUTPATIENT
Start: 2020-01-09 | End: 2020-01-09 | Stop reason: HOSPADM

## 2020-01-09 RX ORDER — FAMOTIDINE 10 MG/ML
20 INJECTION, SOLUTION INTRAVENOUS EVERY 12 HOURS SCHEDULED
Status: DISCONTINUED | OUTPATIENT
Start: 2020-01-09 | End: 2020-01-13 | Stop reason: HOSPADM

## 2020-01-09 RX ORDER — LABETALOL HYDROCHLORIDE 5 MG/ML
5 INJECTION, SOLUTION INTRAVENOUS
Status: DISCONTINUED | OUTPATIENT
Start: 2020-01-09 | End: 2020-01-09 | Stop reason: HOSPADM

## 2020-01-09 RX ORDER — NALOXONE HCL 0.4 MG/ML
0.4 VIAL (ML) INJECTION AS NEEDED
Status: DISCONTINUED | OUTPATIENT
Start: 2020-01-09 | End: 2020-01-09 | Stop reason: HOSPADM

## 2020-01-09 RX ORDER — FLUMAZENIL 0.1 MG/ML
0.2 INJECTION INTRAVENOUS AS NEEDED
Status: DISCONTINUED | OUTPATIENT
Start: 2020-01-09 | End: 2020-01-09 | Stop reason: HOSPADM

## 2020-01-09 RX ORDER — HYDROMORPHONE HCL 110MG/55ML
PATIENT CONTROLLED ANALGESIA SYRINGE INTRAVENOUS AS NEEDED
Status: DISCONTINUED | OUTPATIENT
Start: 2020-01-09 | End: 2020-01-09 | Stop reason: SURG

## 2020-01-09 RX ORDER — FENTANYL CITRATE 50 UG/ML
INJECTION, SOLUTION INTRAMUSCULAR; INTRAVENOUS AS NEEDED
Status: DISCONTINUED | OUTPATIENT
Start: 2020-01-09 | End: 2020-01-09 | Stop reason: SURG

## 2020-01-09 RX ORDER — SODIUM CHLORIDE 9 MG/ML
INJECTION, SOLUTION INTRAVENOUS CONTINUOUS PRN
Status: DISCONTINUED | OUTPATIENT
Start: 2020-01-09 | End: 2020-01-09 | Stop reason: SURG

## 2020-01-09 RX ORDER — PROMETHAZINE HYDROCHLORIDE 25 MG/1
25 TABLET ORAL ONCE AS NEEDED
Status: DISCONTINUED | OUTPATIENT
Start: 2020-01-09 | End: 2020-01-09 | Stop reason: HOSPADM

## 2020-01-09 RX ORDER — ACETAMINOPHEN 325 MG/1
650 TABLET ORAL ONCE AS NEEDED
Status: DISCONTINUED | OUTPATIENT
Start: 2020-01-09 | End: 2020-01-09 | Stop reason: HOSPADM

## 2020-01-09 RX ORDER — ONDANSETRON 2 MG/ML
4 INJECTION INTRAMUSCULAR; INTRAVENOUS ONCE AS NEEDED
Status: DISCONTINUED | OUTPATIENT
Start: 2020-01-09 | End: 2020-01-09 | Stop reason: HOSPADM

## 2020-01-09 RX ORDER — SODIUM CHLORIDE 9 MG/ML
1000 INJECTION, SOLUTION INTRAVENOUS CONTINUOUS
Status: DISCONTINUED | OUTPATIENT
Start: 2020-01-09 | End: 2020-01-09

## 2020-01-09 RX ORDER — ROCURONIUM BROMIDE 10 MG/ML
INJECTION, SOLUTION INTRAVENOUS AS NEEDED
Status: DISCONTINUED | OUTPATIENT
Start: 2020-01-09 | End: 2020-01-09 | Stop reason: SURG

## 2020-01-09 RX ORDER — SODIUM CHLORIDE, SODIUM GLUCONATE, SODIUM ACETATE, POTASSIUM CHLORIDE, AND MAGNESIUM CHLORIDE 526; 502; 368; 37; 30 MG/100ML; MG/100ML; MG/100ML; MG/100ML; MG/100ML
INJECTION, SOLUTION INTRAVENOUS CONTINUOUS PRN
Status: DISCONTINUED | OUTPATIENT
Start: 2020-01-09 | End: 2020-01-09 | Stop reason: SURG

## 2020-01-09 RX ORDER — ALVIMOPAN 12 MG/1
12 CAPSULE ORAL 2 TIMES DAILY
Status: DISCONTINUED | OUTPATIENT
Start: 2020-01-10 | End: 2020-01-12

## 2020-01-09 RX ORDER — ACETAMINOPHEN 650 MG/1
650 SUPPOSITORY RECTAL ONCE AS NEEDED
Status: DISCONTINUED | OUTPATIENT
Start: 2020-01-09 | End: 2020-01-09 | Stop reason: HOSPADM

## 2020-01-09 RX ORDER — MIDAZOLAM HYDROCHLORIDE 1 MG/ML
INJECTION INTRAMUSCULAR; INTRAVENOUS AS NEEDED
Status: DISCONTINUED | OUTPATIENT
Start: 2020-01-09 | End: 2020-01-09 | Stop reason: SURG

## 2020-01-09 RX ORDER — ERYTHROMYCIN 500 MG/1
1000 TABLET, DELAYED RELEASE ORAL
COMMUNITY
End: 2020-01-13 | Stop reason: HOSPADM

## 2020-01-09 RX ORDER — DIPHENHYDRAMINE HYDROCHLORIDE 50 MG/ML
12.5 INJECTION INTRAMUSCULAR; INTRAVENOUS
Status: DISCONTINUED | OUTPATIENT
Start: 2020-01-09 | End: 2020-01-09 | Stop reason: HOSPADM

## 2020-01-09 RX ORDER — NEOSTIGMINE METHYLSULFATE 4 MG/4 ML
SYRINGE (ML) INTRAVENOUS AS NEEDED
Status: DISCONTINUED | OUTPATIENT
Start: 2020-01-09 | End: 2020-01-09 | Stop reason: SURG

## 2020-01-09 RX ORDER — ACETAMINOPHEN 500 MG
1000 TABLET ORAL EVERY 6 HOURS
Status: DISCONTINUED | OUTPATIENT
Start: 2020-01-09 | End: 2020-01-10

## 2020-01-09 RX ORDER — MORPHINE SULFATE 1 MG/ML
INJECTION INTRAVENOUS CONTINUOUS
Status: DISCONTINUED | OUTPATIENT
Start: 2020-01-09 | End: 2020-01-12

## 2020-01-09 RX ORDER — PROPOFOL 10 MG/ML
VIAL (ML) INTRAVENOUS AS NEEDED
Status: DISCONTINUED | OUTPATIENT
Start: 2020-01-09 | End: 2020-01-09 | Stop reason: SURG

## 2020-01-09 RX ORDER — LIDOCAINE HYDROCHLORIDE 20 MG/ML
INJECTION, SOLUTION INFILTRATION; PERINEURAL AS NEEDED
Status: DISCONTINUED | OUTPATIENT
Start: 2020-01-09 | End: 2020-01-09 | Stop reason: SURG

## 2020-01-09 RX ADMIN — ONDANSETRON 4 MG: 2 INJECTION INTRAMUSCULAR; INTRAVENOUS at 09:59

## 2020-01-09 RX ADMIN — ACETAMINOPHEN 650 MG: 325 TABLET, FILM COATED ORAL at 06:38

## 2020-01-09 RX ADMIN — SODIUM CHLORIDE: 900 INJECTION, SOLUTION INTRAVENOUS at 07:52

## 2020-01-09 RX ADMIN — EPHEDRINE SULFATE 5 MG: 50 INJECTION INTRAVENOUS at 08:59

## 2020-01-09 RX ADMIN — SODIUM CHLORIDE 1000 ML: 900 INJECTION, SOLUTION INTRAVENOUS at 06:37

## 2020-01-09 RX ADMIN — ROCURONIUM BROMIDE 10 MG: 10 INJECTION INTRAVENOUS at 09:13

## 2020-01-09 RX ADMIN — SODIUM CHLORIDE: 900 INJECTION, SOLUTION INTRAVENOUS at 08:37

## 2020-01-09 RX ADMIN — ROCURONIUM BROMIDE 40 MG: 10 INJECTION INTRAVENOUS at 07:55

## 2020-01-09 RX ADMIN — LIDOCAINE HYDROCHLORIDE 80 MG: 20 INJECTION, SOLUTION INFILTRATION; PERINEURAL at 07:26

## 2020-01-09 RX ADMIN — PROPOFOL 30 MG: 10 INJECTION, EMULSION INTRAVENOUS at 07:44

## 2020-01-09 RX ADMIN — EPHEDRINE SULFATE 5 MG: 50 INJECTION INTRAVENOUS at 08:46

## 2020-01-09 RX ADMIN — PROPOFOL 20 MG: 10 INJECTION, EMULSION INTRAVENOUS at 07:29

## 2020-01-09 RX ADMIN — SODIUM CHLORIDE: 900 INJECTION, SOLUTION INTRAVENOUS at 08:36

## 2020-01-09 RX ADMIN — FENTANYL CITRATE 75 MCG: 50 INJECTION, SOLUTION INTRAMUSCULAR; INTRAVENOUS at 07:44

## 2020-01-09 RX ADMIN — FAMOTIDINE 20 MG: 10 INJECTION INTRAVENOUS at 20:23

## 2020-01-09 RX ADMIN — POTASSIUM CHLORIDE, DEXTROSE MONOHYDRATE AND SODIUM CHLORIDE 100 ML/HR: 150; 5; 450 INJECTION, SOLUTION INTRAVENOUS at 23:18

## 2020-01-09 RX ADMIN — MORPHINE SULFATE: 1 INJECTION INTRAVENOUS at 16:39

## 2020-01-09 RX ADMIN — Medication 2 MG: at 10:12

## 2020-01-09 RX ADMIN — EPHEDRINE SULFATE 5 MG: 50 INJECTION INTRAVENOUS at 10:02

## 2020-01-09 RX ADMIN — GLYCOPYRROLATE 0.4 MG: 0.2 INJECTION, SOLUTION INTRAMUSCULAR; INTRAVITREAL at 10:12

## 2020-01-09 RX ADMIN — POTASSIUM CHLORIDE, DEXTROSE MONOHYDRATE AND SODIUM CHLORIDE 100 ML/HR: 150; 5; 450 INJECTION, SOLUTION INTRAVENOUS at 13:14

## 2020-01-09 RX ADMIN — SODIUM CHLORIDE: 900 INJECTION, SOLUTION INTRAVENOUS at 08:21

## 2020-01-09 RX ADMIN — CEFOXITIN 2 G: 2 INJECTION, POWDER, FOR SOLUTION INTRAVENOUS at 07:48

## 2020-01-09 RX ADMIN — SODIUM CHLORIDE, SODIUM GLUCONATE, SODIUM ACETATE, POTASSIUM CHLORIDE, AND MAGNESIUM CHLORIDE: 526; 502; 368; 37; 30 INJECTION, SOLUTION INTRAVENOUS at 09:25

## 2020-01-09 RX ADMIN — FAMOTIDINE 20 MG: 10 INJECTION INTRAVENOUS at 13:14

## 2020-01-09 RX ADMIN — PROPOFOL 70 MG: 10 INJECTION, EMULSION INTRAVENOUS at 07:26

## 2020-01-09 RX ADMIN — HYDROMORPHONE HYDROCHLORIDE 0.5 MG: 2 INJECTION INTRAMUSCULAR; INTRAVENOUS; SUBCUTANEOUS at 10:09

## 2020-01-09 RX ADMIN — MIDAZOLAM HYDROCHLORIDE 1 MG: 2 INJECTION, SOLUTION INTRAMUSCULAR; INTRAVENOUS at 07:12

## 2020-01-09 RX ADMIN — SUCCINYLCHOLINE CHLORIDE 120 MG: 20 INJECTION, SOLUTION INTRAMUSCULAR; INTRAVENOUS at 07:44

## 2020-01-09 RX ADMIN — FENTANYL CITRATE 25 MCG: 50 INJECTION, SOLUTION INTRAMUSCULAR; INTRAVENOUS at 07:37

## 2020-01-09 RX ADMIN — PROPOFOL 20 MG: 10 INJECTION, EMULSION INTRAVENOUS at 07:32

## 2020-01-09 RX ADMIN — PHENYLEPHRINE HYDROCHLORIDE 100 MCG: 10 INJECTION INTRAVENOUS at 07:30

## 2020-01-09 NOTE — ANESTHESIA PROCEDURE NOTES
Peripheral IV    Patient location during procedure: OR  Line placed for Fluids/Medication Admin.  Performed By   MARGOT: Cassandra Page CRNA  Peripheral IV Prep   Patient position: supine   Prep: ChloraPrep and alcohol swabs  Patient monitoring: heart rate, cardiac monitor and continuous pulse ox  Peripheral IV Procedure   Laterality:left  Location:  Forearm  Catheter size: 20 G         Post Assessment   Dressing Type: tape and transparent.    IV Dressing/Site: clean, dry and intact

## 2020-01-09 NOTE — OP NOTE
COLON RESECTION LAPAROSCOPIC POSSIBLE OPEN, COLONOSCOPY  Procedure Note    Sharon Esposito  1/9/2020    Pre-op Diagnosis:   Adenomatous polyp [D36.9]    Post-op Diagnosis:     Post-Op Diagnosis Codes:     * Adenomatous polyp [D36.9]    Procedure/CPT® Codes:      Procedure(s):  COLON RESECTION LAPAROSCOPIC CONVERTED TO OPEN  COLONOSCOPY    DONE IN OR WITH ENDO             (colonoscpy first)    Surgeon(s):  Ozzy Young MD    Anesthesia: General    Staff:   Circulator: Padmini Carrillo RN; Ariane Menendez RN  Scrub Person: Kristine Salamanca  Endo Technician: Cassandra Rodrigues MA; Fani Claudio CST  Assistant: Cassandra Crowley CSA  Endo Nurse: Fern Fuentes RN    Estimated Blood Loss: minimal    Specimens:                ID Type Source Tests Collected by Time   A : TIP OF APPENDIX, RIGHT COLON, AND COMMON ILEUM-MD TO LOOK AT AND RETURN Tissue Large Intestine, Right / Ascending Colon TISSUE PATHOLOGY EXAM Ozzy Young MD 1/9/2020 0931         Drains:   NG/OG Tube Nasogastric Right nostril (Active)       Urethral Catheter Double-lumen 16 Fr. (Active)       Indications: 74-year-old female referred to GI service after patient underwent 2 colonoscopies with findings of a tubulovillous adenomatous polyp in the a sending colon.  Unable to be removed endoscopically.  Been biopsied twice and not an invasive cancer by those biopsies.  Patient presents now for evaluation and resection.    Findings: Full colonoscopy was done.  Findings are significant for diverticulosis in the sigmoid colon.  There is at least a 2 cm polyp with a broad base a couple folds distal to the ileocecal valve.  This area been marked with Delaney ink from previous GI evaluation.  We also took an x-ray of the abdomen with the scope in place and it confirmed that we were in the right colon and also demonstrated the anatomy transverse colon with the splenic flexure very high.  We then wants to colonoscopy was done.  We were able do the  colo-laparoscopic resection without incident.    Complications: None    Procedure: Patient was brought to the operating she received a mechanical and antibiotic bowel prep.  She received Mefoxin IV antibiotics.  She placed under sedation antibiotic surgical blood placed under MAC anesthesia without any difficulty.  Placed left side down right side up she had SCDs in place.  Colonoscopy initially was performed.  Digital rectal exam was performed was unremarkable.  Scope was entered into the rectum and passed up and around to the cecum without any real difficulty.  Prep was adequate to identify polyps.  Findings significant only for diverticulosis.  There was apparent fairly redundant colon when possible abdominal pressure get out of the cecum but were able to do that.  We clearly confirmed that this was the cecum and we saw the polyp as well as the Delaney ink 2 folds distal to the ileocecal valve.  Once we were in the cecum scope was brought back we confirmed that this was the polyp and again does not feel like to get this out endoscopically.  We left the scope in position and then we brought x-ray into the flatplate of the abdomen to confirm that we were in the right colon.  So then we brought the scope on out.slowly we suctioned as much gas as possible plate.  Please note we used CO2 for this case to help us with laparoscopic portion later.  Once it was confirmed working to proceed with the operation.  Patient was positioned she was placed under general trach anesthesia at that point.  She had 2 good IVs in place Nieves catheter was placed.  Her abdomen was then prepped and draped in normal sterile fashion.  Proper timeout was taken.  Everyone in the room was in agreement.  Cutdown was performed the supraumbilical position 10 levels on trochars placed high difficulty.  Adequate pneumoperitoneum was obtained a tap abdominal wall block was performed with a total 30 cc absent Marcaine.  15 cc were injected right side of  the abdomen 15 cc and left-sided laparoscope for guidance.  Once that was done 5 mm trochars placed in the upper midline proxy 7 cm from the previous trocar.  We then placed another 5 mm trocar in the lower midline again under direct vision.  Initial exploration of the abdomen liver was unremarkable no obvious metastatic disease was appreciated about could be seen laparoscopically.  We saw her that there is dye present in the cecum was actually fairly mobilized there was some adhesions of the appendix retrocecal position with cecum itself appeared to be fairly mobile.  Hepatic flexure went behind the gallbladder and gallbladder appeared to be unremarkable.  We began by taking the adhesions down near the cecum with Enseal device.  We will follow this up significant amount of pericolic fat will follow this around the hepatic flexure and then we went medially we found the transverse colon proximally attempted to start taking the colon off of the omentum.  This however dived fairly deeply with her body habitus it was difficult to do.  Went back and attempted come from a lateral to medial direction but again could not get good exposure mainly due to her body habitus.  Attempted to look at this from a medial approach pick the cecum up but again exposure would have an issue with her body habitus so I elected to at that point stopped the laparoscopic portion.    trochars were removed.  Skin incision was made in between the 2 upper trocar sites and the subcu the fascia was divided with the electrocautery.  Wound protector was placed.  Good exposure to the hepatic to the transverse colon and hepatic flexure.  Some lesions posteriorly.  We to close down in layers freeing up the hepatic flexure.  It is much easier been able to hold the adipose tissues out of the way for exposure duodenum was clearly identified and was not to injure this dissection in the mesentery was freed up freeing up the hepatic flexure completely and the  cecum and right colon with already had been freed up distally.  Review adhesions for the terminal ileum at this point which we took close down the right ankle and is freed up the terminal ileum well.  We identified where a polyp was and came over to the middle colic vessels we went proximal to the middle colic vessel divided the transverse colon with a JOHNNY stapler and then divided the terminal ileum with a JOHNNY stapler.  We took the mesentery with the Enseal device and the ileocolic artery and vein were then taken separately with Vicryl ties and right angle clamps and then Vicryl ties.  Specimen was handed off and sent to pathology brought back to confirm that the polyp had been removed did not look malignant.  Final pathology is pending.  We then inspected pneumostasis was noted we did a side-to-side functional end-to-end anastomosis with a 75 JOHNNY stapler then closed the common open with a TA 50 stapler.  At the completion both sides of anastomosis were pink and viable there was no tension on the anastomosis.  Last staple line was then oversewn with interrupted 3-0 Vicryl figure-of-eight stitches.  Suspected areas dissection.  Anastomosis was under no tension was pink and viable at the completion.  Bowel was returned to the abdomen omentum was then pulled out over the bowel NG tube was checked for positioning.  We then closed the wound with a running #1 PDS sutures.  Subcu was irrigated and skin was closed with a running 2-0 Vicryl stitch and then skin staples.  Elizabeth wound VAC was placed.  Patient was extubated and then transferred to the recovery room in stable condition      Disposition: Transfer to the recovery room in stable condition        Ozzy Young MD     Date: 1/9/2020  Time: 10:28 AM

## 2020-01-09 NOTE — ANESTHESIA PROCEDURE NOTES
Airway  Urgency: elective    Date/Time: 1/9/2020 7:45 AM    General Information and Staff    Patient location during procedure: OR  CRNA: Cassandra Page CRNA    Indications and Patient Condition  Indications for airway management: airway protection    Preoxygenated: yes  Mask difficulty assessment: 0 - not attempted    Final Airway Details  Final airway type: endotracheal airway      Successful airway: ETT  Cuffed: yes   Successful intubation technique: direct laryngoscopy  Endotracheal tube insertion site: oral  Blade: Lisa  Blade size: 3  ETT size (mm): 7.0  Cormack-Lehane Classification: grade IIa - partial view of glottis  Placement verified by: chest auscultation and capnometry   Cuff volume (mL): 8  Measured from: teeth  ETT/EBT  to teeth (cm): 19  Number of attempts at approach: 1  Assessment: lips, teeth, and gum same as pre-op and atraumatic intubation    Additional Comments  Intubated per ANA Chauhan, SRNA

## 2020-01-09 NOTE — ANESTHESIA PREPROCEDURE EVALUATION
Anesthesia Evaluation     Patient summary reviewed   NPO Solid Status: > 8 hours  NPO Liquid Status: > 6 hours           Airway   Mallampati: II  Neck ROM: full  No difficulty expected  Dental    (+) lower dentures    Pulmonary - normal exam   (+) a smoker Former,   Cardiovascular - normal exam  Exercise tolerance: poor (<4 METS)    NYHA Classification: III  ECG reviewed  Patient on routine beta blocker    (+) hypertension, hyperlipidemia,       Neuro/Psych  (+) numbness, psychiatric history Anxiety,     GI/Hepatic/Renal/Endo    (+)   diabetes mellitus, thyroid problem     Musculoskeletal     (+) back pain,   Abdominal    Substance History      OB/GYN          Other   arthritis,                      Anesthesia Plan    ASA 3     general     intravenous induction     Anesthetic plan, all risks, benefits, and alternatives have been provided, discussed and informed consent has been obtained with: patient.  Use of blood products discussed with patient .

## 2020-01-09 NOTE — SIGNIFICANT NOTE
01/09/20 1514   Rehab Time/Intention   Evaluation Not Performed other (see comments)  (OT reports RN recommends PT/OT check back tomorrow as pt is still sleeping s/p surgery and not able to participate this pm. PT will follow-up tomorrow.)   Rehab Treatment   Discipline physical therapist

## 2020-01-09 NOTE — PLAN OF CARE
Patient arrived to unit at 1147 from PACU. Vital signs have been stable and urine output has remained adequate. Patient has denied pain. NGT removed per Dr. Young's order upon evening rounds.

## 2020-01-09 NOTE — INTERVAL H&P NOTE
H&P reviewed. The patient was examined and there are no changes to the H&P.      Temp:  [98.2 °F (36.8 °C)] 98.2 °F (36.8 °C)  Heart Rate:  [84] 84  Resp:  [20] 20  BP: (129)/(60) 129/60

## 2020-01-09 NOTE — ANESTHESIA POSTPROCEDURE EVALUATION
Patient: Sharon Esposito    Procedure Summary     Date:  01/09/20 Room / Location:  [facility] OR 09 / [facility] OR    Anesthesia Start:  0716 Anesthesia Stop:  1039    Procedures:       COLON RESECTION LAPAROSCOPIC CONVERTED TO OPEN (N/A Abdomen)      COLONOSCOPY    DONE IN OR WITH ENDO             (colonoscpy first) (N/A ) Diagnosis:       Adenomatous polyp      (Adenomatous polyp [D36.9])    Surgeon:  Ozzy Young MD Provider:  Kvng Fountain MD    Anesthesia Type:  general ASA Status:  3          Anesthesia Type: general    Vitals  No vitals data found for the desired time range.          Post Anesthesia Care and Evaluation    Patient location during evaluation: PACU  Patient participation: complete - patient participated  Level of consciousness: sleepy but conscious  Pain management: adequate  Airway patency: patent  Anesthetic complications: No anesthetic complications  PONV Status: none  Cardiovascular status: acceptable  Respiratory status: acceptable and face mask  Hydration status: acceptable

## 2020-01-10 LAB
ANION GAP SERPL CALCULATED.3IONS-SCNC: 12 MMOL/L (ref 5–15)
BASOPHILS # BLD AUTO: 0.04 10*3/MM3 (ref 0–0.2)
BASOPHILS NFR BLD AUTO: 0.3 % (ref 0–1.5)
BUN BLD-MCNC: 5 MG/DL (ref 8–23)
BUN/CREAT SERPL: 7.6 (ref 7–25)
CALCIUM SPEC-SCNC: 7.5 MG/DL (ref 8.6–10.5)
CHLORIDE SERPL-SCNC: 105 MMOL/L (ref 98–107)
CO2 SERPL-SCNC: 20 MMOL/L (ref 22–29)
CREAT BLD-MCNC: 0.66 MG/DL (ref 0.57–1)
DEPRECATED RDW RBC AUTO: 48.9 FL (ref 37–54)
EOSINOPHIL # BLD AUTO: 0.06 10*3/MM3 (ref 0–0.4)
EOSINOPHIL NFR BLD AUTO: 0.5 % (ref 0.3–6.2)
ERYTHROCYTE [DISTWIDTH] IN BLOOD BY AUTOMATED COUNT: 15.5 % (ref 12.3–15.4)
GFR SERPL CREATININE-BSD FRML MDRD: 88 ML/MIN/1.73
GLUCOSE BLD-MCNC: 133 MG/DL (ref 65–99)
HCT VFR BLD AUTO: 32.9 % (ref 34–46.6)
HGB BLD-MCNC: 10.6 G/DL (ref 12–15.9)
IMM GRANULOCYTES # BLD AUTO: 0.05 10*3/MM3 (ref 0–0.05)
IMM GRANULOCYTES NFR BLD AUTO: 0.4 % (ref 0–0.5)
LYMPHOCYTES # BLD AUTO: 2.9 10*3/MM3 (ref 0.7–3.1)
LYMPHOCYTES NFR BLD AUTO: 22.8 % (ref 19.6–45.3)
MCH RBC QN AUTO: 27.9 PG (ref 26.6–33)
MCHC RBC AUTO-ENTMCNC: 32.2 G/DL (ref 31.5–35.7)
MCV RBC AUTO: 86.6 FL (ref 79–97)
MONOCYTES # BLD AUTO: 0.9 10*3/MM3 (ref 0.1–0.9)
MONOCYTES NFR BLD AUTO: 7.1 % (ref 5–12)
NEUTROPHILS # BLD AUTO: 8.77 10*3/MM3 (ref 1.7–7)
NEUTROPHILS NFR BLD AUTO: 68.9 % (ref 42.7–76)
NRBC BLD AUTO-RTO: 0 /100 WBC (ref 0–0.2)
PLATELET # BLD AUTO: 290 10*3/MM3 (ref 140–450)
PMV BLD AUTO: 9.4 FL (ref 6–12)
POTASSIUM BLD-SCNC: 3.7 MMOL/L (ref 3.5–5.2)
RBC # BLD AUTO: 3.8 10*6/MM3 (ref 3.77–5.28)
SODIUM BLD-SCNC: 137 MMOL/L (ref 136–145)
WBC NRBC COR # BLD: 12.72 10*3/MM3 (ref 3.4–10.8)

## 2020-01-10 PROCEDURE — 94799 UNLISTED PULMONARY SVC/PX: CPT

## 2020-01-10 PROCEDURE — 97162 PT EVAL MOD COMPLEX 30 MIN: CPT

## 2020-01-10 PROCEDURE — 85025 COMPLETE CBC W/AUTO DIFF WBC: CPT | Performed by: SURGERY

## 2020-01-10 PROCEDURE — 80048 BASIC METABOLIC PNL TOTAL CA: CPT | Performed by: SURGERY

## 2020-01-10 PROCEDURE — 94760 N-INVAS EAR/PLS OXIMETRY 1: CPT

## 2020-01-10 PROCEDURE — 97166 OT EVAL MOD COMPLEX 45 MIN: CPT

## 2020-01-10 PROCEDURE — 97530 THERAPEUTIC ACTIVITIES: CPT

## 2020-01-10 PROCEDURE — 25010000002 ENOXAPARIN PER 10 MG: Performed by: SURGERY

## 2020-01-10 PROCEDURE — 25010000002 ONDANSETRON PER 1 MG: Performed by: SURGERY

## 2020-01-10 RX ORDER — DEXTROSE, SODIUM CHLORIDE, AND POTASSIUM CHLORIDE 5; .45; .15 G/100ML; G/100ML; G/100ML
50 INJECTION INTRAVENOUS CONTINUOUS
Status: DISCONTINUED | OUTPATIENT
Start: 2020-01-10 | End: 2020-01-13 | Stop reason: HOSPADM

## 2020-01-10 RX ORDER — ACETAMINOPHEN 500 MG
1000 TABLET ORAL EVERY 6 HOURS
Status: COMPLETED | OUTPATIENT
Start: 2020-01-10 | End: 2020-01-12

## 2020-01-10 RX ADMIN — ACETAMINOPHEN 1000 MG: 500 TABLET ORAL at 00:13

## 2020-01-10 RX ADMIN — ALVIMOPAN 12 MG: 12 CAPSULE ORAL at 08:52

## 2020-01-10 RX ADMIN — POTASSIUM CHLORIDE, DEXTROSE MONOHYDRATE AND SODIUM CHLORIDE 100 ML/HR: 150; 5; 450 INJECTION, SOLUTION INTRAVENOUS at 08:52

## 2020-01-10 RX ADMIN — FAMOTIDINE 20 MG: 10 INJECTION INTRAVENOUS at 20:48

## 2020-01-10 RX ADMIN — ACETAMINOPHEN 1000 MG: 500 TABLET ORAL at 20:48

## 2020-01-10 RX ADMIN — ALVIMOPAN 12 MG: 12 CAPSULE ORAL at 21:01

## 2020-01-10 RX ADMIN — ACETAMINOPHEN 1000 MG: 500 TABLET ORAL at 06:00

## 2020-01-10 RX ADMIN — ACETAMINOPHEN 1000 MG: 500 TABLET ORAL at 13:23

## 2020-01-10 RX ADMIN — ENOXAPARIN SODIUM 40 MG: 40 INJECTION SUBCUTANEOUS at 08:52

## 2020-01-10 RX ADMIN — POTASSIUM CHLORIDE, DEXTROSE MONOHYDRATE AND SODIUM CHLORIDE 100 ML/HR: 150; 5; 450 INJECTION, SOLUTION INTRAVENOUS at 19:43

## 2020-01-10 RX ADMIN — FAMOTIDINE 20 MG: 10 INJECTION INTRAVENOUS at 08:52

## 2020-01-10 RX ADMIN — ONDANSETRON HYDROCHLORIDE 4 MG: 2 INJECTION, SOLUTION INTRAMUSCULAR; INTRAVENOUS at 09:04

## 2020-01-10 NOTE — PLAN OF CARE
Problem: Patient Care Overview  Goal: Plan of Care Review  Outcome: Ongoing (interventions implemented as appropriate)   Pt arrived from CCU, alert and oriented, bp slightly elevated, pso2 low on RA and etco2 initiated upon arrival and o2 applied. Pso2 93 on 3L NC.

## 2020-01-10 NOTE — PLAN OF CARE
Problem: Patient Care Overview  Goal: Plan of Care Review  Outcome: Ongoing (interventions implemented as appropriate)  Flowsheets (Taken 1/10/2020 1318)  Plan of Care Reviewed With: patient  Outcome Summary: PM tx completed. pt requires min A for sit-stand-sit. pt transferred recliner to transport chair min A w/ HHA. pt ambulated 8 ft min A w/ HHA. no new goals met at this time. pt would continue to bennfit from PT services.

## 2020-01-10 NOTE — THERAPY TREATMENT NOTE
Acute Care - Physical Therapy Treatment Note  [facility]     Patient Name: Sharon Esposito  : 1945  MRN: [id number]  Today's Date: 1/10/2020  Onset of Illness/Injury or Date of Surgery: 20     Referring Physician: ELLIE Mcneill MD    Admit Date: 2020    Visit Dx:    ICD-10-CM ICD-9-CM   1. Adenomatous polyp D36.9 229.9   2. Decreased functional mobility R26.89 781.99   3. Impaired mobility and activities of daily living Z74.09 799.89     Patient Active Problem List   Diagnosis   • Need for hepatitis C screening test   • Hyperlipidemia   • Acquired hypothyroidism   • Seasonal allergies   • Osteopenia   • Essential hypertension   • Edema   • Diverticulitis of colon   • Allergic rhinitis   • Postmenopausal   • Ingrown toenail   • Paresthesia of both feet   • Type 2 diabetes mellitus without complication, without long-term current use of insulin (CMS/HCC)   • Cobalamin deficiency   • Diabetic peripheral neuropathy (CMS/HCC)   • Encounter for screening for malignant neoplasm of colon   • Villous adenoma of colon 2019   • Adenomatous polyp       Therapy Treatment    Rehabilitation Treatment Summary     Row Name 01/10/20 1507             Treatment Time/Intention    Discipline  physical therapy assistant  -PAOLA      Document Type  therapy note (daily note)  -PAOLA      Mode of Treatment  physical therapy;individual therapy  -PAOLA      Therapy Frequency (PT Clinical Impression)  2 times/day  -PAOLA      Patient Effort  good  -PAOLA      Comment  tx limited by MT arriving to take pt upstairs.   -PAOLA      Existing Precautions/Restrictions  fall;oxygen therapy device and L/min  -PAOLA      Recorded by [PAOLA] Timothy Doe, PTA 01/10/20 1527      Row Name 01/10/20 1507             Vital Signs    Pre Systolic BP Rehab  -- 193/91 in R upper arm. nsg states to use lower arm 179/77  -PAOLA      Post Treatment Diastolic BP  -- ending vitals not taken due to pt w/ MT  -PAOLA      Pretreatment Heart Rate (beats/min)  88  -PAOLA       Pre SpO2 (%)  91  -PAOLA      O2 Delivery Pre Treatment  room air  -PAOLA      Pre Patient Position  Sitting  -PAOLA      Post Patient Position  Sitting  -PAOLA      Recorded by [PAOLA] Timothy Doe PTA 01/10/20 1527      Row Name 01/10/20 1507             Cognitive Assessment/Intervention- PT/OT    Affect/Mental Status (Cognitive)  WFL  -PAOLA      Orientation Status (Cognition)  oriented x 4  -PAOLA      Follows Commands (Cognition)  WFL  -PAOLA      Cognitive Function (Cognitive)  WFL  -PAOLA      Recorded by [PAOLA] Timothy Doe PTA 01/10/20 1527      Row Name 01/10/20 1507             Safety Issues, Functional Mobility    Impairments Affecting Function (Mobility)  coordination;balance;endurance/activity tolerance;pain;postural/trunk control;strength  -PAOLA      Recorded by [PAOLA] Timothy Doe PTA 01/10/20 1527      Row Name 01/10/20 1507             Transfer Assessment/Treatment    Transfer Assessment/Treatment  sit-stand transfer;stand-sit transfer;bed-chair transfer  -PAOLA      Recorded by [PAOLA] Timothy Doe PTA 01/10/20 1527      Row Name 01/10/20 1507             Bed-Chair Transfer    Bed-Chair [place] (Transfers)  minimum assist (75% patient effort)  -PAOLA      Assistive Device (Bed-Chair Transfers)  walker, front-wheeled  -PAOLA      Recorded by [PAOLA] Timothy Doe PTA 01/10/20 1527      Row Name 01/10/20 1507             Sit-Stand Transfer    Sit-Stand [place] (Transfers)  minimum assist (75% patient effort)  -PAOLA      Assistive Device (Sit-Stand Transfers)  walker, front-wheeled  -PAOLA      Recorded by [PAOLA] Timothy Doe PTA 01/10/20 1527      Row Name 01/10/20 1507             Stand-Sit Transfer    Stand-Sit [place] (Transfers)  minimum assist (75% patient effort)  -PAOLA      Assistive Device (Stand-Sit Transfers)  walker, front-wheeled  -PAOLA      Recorded by [PAOLA] Timothy Doe PTA 01/10/20 1527      Row Name 01/10/20 1507             Gait/Stairs Assessment/Training    Gait/Stairs Assessment/Training  " gait/ambulation independence;gait/ambulation assistive device;distance ambulated;gait pattern;gait deviations  -PAOLA      [place] Level (Gait)  minimum assist (75% patient effort)  -PAOLA      Assistive Device (Gait)  other (see comments) HHA  -PAOLA      Distance in Feet (Gait)  8 recliner to transport chair at doorway.   -PAOLA      Deviations/Abnormal Patterns (Gait)  [place] decreased;gait speed decreased;stride length decreased  -PAOLA      Recorded by [PAOLA] Timothy Doe PTA 01/10/20 1527      Row Name 01/10/20 1507             Positioning and Restraints    Pre-Treatment Position  sitting in chair/recliner  -PAOLA      Post Treatment Position  chair  -PAOLA      In Chair  with other staff transport chair w/ MT  -PAOLA      Recorded by [PAOLA] Timothy Doe PTA 01/10/20 1527      Row Name 01/10/20 1507             Pain Scale: Numbers Pre/Post-Treatment    Pain Scale: Numbers, Pretreatment  0/10 - no pain \"soreness\"  -PAOLA      Pain Scale: Numbers, Post-Treatment  0/10 - no pain  -PAOLA      Pain Location - Side  --  -PAOLA      Pain Location - Orientation  --  -PAOLA      Pain Location  abdomen  -PAOLA      Recorded by [PAOLA] Timothy Doe PTA 01/10/20 1527      Row Name                Wound 01/09/20 0816 abdomen Incision    Wound - Properties Group Date first assessed: 01/09/20 [TR] Time first assessed: 0816 [TR] Present on Hospital Admission: N [TR] Location: abdomen [TR] Primary Wound Type: Incision [TR] Recorded by:  [TR] Ariane Menendez RN 01/09/20 0832    Row Name 01/10/20 1507             Outcome Summary/Treatment Plan (PT)    Daily Summary of Progress (PT)  progress toward functional goals as expected  -      Plan for Continued Treatment (PT)  continue  -PAOLA      Anticipated Discharge Disposition (PT)  anticipate therapy at next level of care  -PAOLA      Recorded by [PAOLA] Timothy Doe PTA 01/10/20 1527        User Key  (r) = Recorded By, (t) = Taken By, (c) = Cosigned By    Initials Name Effective Dates Discipline    PAOLA " Timothy Doe, PTA 03/07/18 -  PT    TR Ariane Menendez, RN 10/17/16 -  Nurse          Wound 01/09/20 0816 abdomen Incision (Active)   Dressing Appearance dry;intact 1/10/2020 12:00 PM   Closure GIGI 1/10/2020 12:00 PM   Drainage Amount none 1/10/2020 12:00 PM   Dressing Care, Wound other (see comments) 1/9/2020  8:00 PM       Rehab Goal Summary     Row Name 01/10/20 1507 01/10/20 0937 01/10/20 0936       Physical Therapy Goals    Problem Specific Goal Selection (PT)  problem specific goal 2, PT;problem specific goal 1, PT  -PAOLA  --  problem specific goal 2, PT;problem specific goal 1, PT  -LR    Additional Documentation  Problem Specific Goal Selection (PT) (Row)  -PAOLA  --  Problem Specific Goal Selection (PT) (Row)  -LR       Bed Mobility Goal 1 (PT)    Activity/Assistive Device (Bed Mobility Goal 1, PT)  sit to supine;supine to sit  -PAOLA  --  sit to supine;supine to sit  -LR    [place] Level/Cues Needed (Bed Mobility Goal 1, PT)  independent  -PAOLA  --  independent  -LR    Time Frame (Bed Mobility Goal 1, PT)  by discharge  -PAOLA  --  by discharge  -LR    Progress/Outcomes (Bed Mobility Goal 1, PT)  goal not met  -PAOLA  --  goal not met  -LR       Transfer Goal 1 (PT)    Activity/Assistive Device (Transfer Goal 1, PT)  bed-to-chair/chair-to-bed  -PAOLA  --  bed-to-chair/chair-to-bed  -LR    [place] Level/Cues Needed (Transfer Goal 1, PT)  independent;conditional independence  -PAOLA  --  independent;conditional independence  -LR    Time Frame (Transfer Goal 1, PT)  by discharge  -PAOLA  --  by discharge  -LR    Progress/Outcome (Transfer Goal 1, PT)  goal not met  -PAOLA  --  goal not met  -LR       Gait Training Goal 1 (PT)    Activity/Assistive Device (Gait Training Goal 1, PT)  gait (walking locomotion);assistive device use;backward stepping  -PAOLA  --  gait (walking locomotion);assistive device use;backward stepping  -LR    [place] Level (Gait Training Goal 1, PT)  independent  -PAOLA  --  independent  -LR    Distance  (Gait Goal 1, PT)  600 ftx1  -PAOLA  --  600 ftx1  -LR    Time Frame (Gait Training Goal 1, PT)  by discharge  -PAOLA  --  by discharge  -LR    Progress/Outcome (Gait Training Goal 1, PT)  goal not met  -PAOLA  --  goal not met  -LR       Stairs Goal 1 (PT)    Activity/Assistive Device (Stairs Goal 1, PT)  stairs, all skills;ascending stairs;descending stairs  -PAOLA  --  stairs, all skills;ascending stairs;descending stairs  -LR    [place] Level/Cues Needed (Stairs Goal 1, PT)  independent  -PAOLA  --  independent  -LR    Number of Stairs (Stairs Goal 1, PT)  3   -PAOLA  --  3   -LR    Time Frame (Stairs Goal 1, PT)  by discharge  -PAOLA  --  by discharge  -LR    Progress/Outcome (Stairs Goal 1, PT)  goal not met  -PAOLA  --  goal not met  -LR       Problem Specific Goal 1 (PT)    Problem Specific Goal 1 (PT)  Patient will completed tinetti balance and gait with medically appropriate.  -PAOLA  --  Patient will completed [place]etti balance and gait with medically appropriate.  -LR    Time Frame (Problem Specific Goal 1, PT)  2 days  -PAOLA  --  2 days  -LR    Progress/Outcome (Problem Specific Goal 1, PT)  goal not met  -PAOLA  --  goal not met  -LR       Problem Specific Goal 2 (PT)    Problem Specific Goal 2 (PT)  Patient will score >24/28 on tinetti to indicate low fall risk.  -PAOLA  --  Patient will score >24/28 on tinetti to indicate low fall risk.  -LR    Time Frame (Problem Specific Goal 2, PT)  by discharge  -PAOLA  --  by discharge  -LR    Progress/Outcome (Problem Specific Goal 2, PT)  goal not met  -PAOLA  --  goal not met  -LR       Patient Education Goal (PT)    Activity (Patient Education Goal, PT)  Patient will understand the how to implent intentional walking program to improve CV endurance and strength  -PAOLA  --  Patient will understand the how to implent intentional walking program to improve CV endurance and strength  -LR    [place]/Cues/Accuracy (Memory Goal 2, PT)  demonstrates adequately;verbalizes understanding  -PAOLA  --   demonstrates adequately;verbalizes understanding  -LR    Time Frame (Patient Education Goal, PT)  by discharge  -PAOLA  --  by discharge  -LR    Progress/Outcome (Patient Education Goal, PT)  goal not met  -PAOLA  --  goal not met  -LR       Occupational Therapy Goals    Transfer Goal Selection (OT)  --  transfer, OT goal 1  -JH  --    Bathing Goal Selection (OT)  --  bathing, OT goal 1  -JH  --    Dressing Goal Selection (OT)  --  dressing, OT goal 1  -JH  --    Toileting Goal Selection (OT)  --  toileting, OT goal 1  -JH  --    Grooming Goal Selection (OT)  --  grooming, OT goal 1  -JH  --    Activity Tolerance Goal Selection (OT)  --  activity tolerance, OT goal 1  -JH  --       Transfer Goal 1 (OT)    Activity/Assistive Device (Transfer Goal 1, OT)  --  transfers, all  -JH  --    [place] Level/Cues Needed (Transfer Goal 1, OT)  --  verbal cues required;tactile cues required;set-up required;supervision required  -  --    Time Frame (Transfer Goal 1, OT)  --  long term goal (LTG);by discharge  -  --    Progress/Outcome (Transfer Goal 1, OT)  --  goal not met  -  --       Bathing Goal 1 (OT)    Activity/Assistive Device (Bathing Goal 1, OT)  --  bathing skills, all  -JH  --    [place] Level/Cues Needed (Bathing Goal 1, OT)  --  standby assist;verbal cues required;tactile cues required;set-up required  -  --    Time Frame (Bathing Goal 1, OT)  --  long term goal (LTG);by discharge  -  --    Progress/Outcomes (Bathing Goal 1, OT)  --  goal not met  -  --       Dressing Goal 1 (OT)    Activity/Assistive Device (Dressing Goal 1, OT)  --  dressing skills, all  -JH  --    [place]/Cues Needed (Dressing Goal 1, OT)  --  standby assist;verbal cues required;tactile cues required;set-up required  -  --    Time Frame (Dressing Goal 1, OT)  --  long term goal (LTG);by discharge  -  --    Progress/Outcome (Dressing Goal 1, OT)  --  goal not met  -  --       Toileting Goal 1 (OT)    Activity/Device  (Toileting Goal 1, OT)  --  toileting skills, all  -JH  --    [place] Level/Cues Needed (Toileting Goal 1, OT)  --  supervision required;verbal cues required;tactile cues required;set-up required  -  --    Time Frame (Toileting Goal 1, OT)  --  long term goal (LTG);by discharge  -  --    Progress/Outcome (Toileting Goal 1, OT)  --  goal not met  -  --       Grooming Goal 1 (OT)    Activity/Device (Grooming Goal 1, OT)  --  grooming skills, all  -JH  --    [place] (Grooming Goal 1, OT)  --  supervision required;verbal cues required;tactile cues required;set-up required  -  --    Time Frame (Grooming Goal 1, OT)  --  long term goal (LTG);by discharge  -  --    Progress/Outcome (Grooming Goal 1, OT)  --  goal not met  -  --        Activity Tolerance Goal 1 (OT)    Activity Level (Endurance Goal 1, OT)  --  -- 20 min functional activity with 1-2 rest breaks   -  --    Time Frame (Activity Tolerance Goal 1, OT)  --  long term goal (LTG);by discharge  -  --    Progress/Outcome (Activity Tolerance Goal 1, OT)  --  goal not met  -  --      User Key  (r) = Recorded By, (t) = Taken By, (c) = Cosigned By    Initials Name Provider Type Discipline    Timothy Rebolledo, PTA Physical Therapy Assistant PT    Kirit Jacome Physical Therapist PT     Brent Marte, OT Occupational Therapist OT              PT Recommendation and Plan  Anticipated Discharge Disposition (PT): anticipate therapy at next level of care  Therapy Frequency (PT Clinical Impression): 2 times/day  Outcome Summary/Treatment Plan (PT)  Daily Summary of Progress (PT): progress toward functional goals as expected  Plan for Continued Treatment (PT): continue  Anticipated Discharge Disposition (PT): anticipate therapy at next level of care  Plan of Care Reviewed With: patient  Outcome Summary: PM tx completed. pt requires min A for sit-stand-sit. pt transferred recliner to transport chair min A w/ HHA. pt ambulated 8 ft min A w/  HHA. no new goals met at this time. pt would continue to bennfit from PT services.  Outcome Measures     Row Name 01/10/20 1507 01/10/20 0937          How much help from another person do you currently need...    Turning from your back to your side while in flat bed without using bedrails?  3  -PAOLA  --     Moving from lying on back to sitting on the side of a flat bed without bedrails?  3  -PAOLA  --     Moving to and from a bed to a chair (including a wheelchair)?  3  -PAOLA  --     Standing up from a chair using your arms (e.g., wheelchair, bedside chair)?  3  -PAOLA  --     Climbing 3-5 steps with a railing?  2  -PAOLA  --     To walk in hospital room?  3  -PAOLA  --     AM-PAC 6 Clicks Score (PT)  17  -PAOLA  --        How much help from another is currently needed...    Putting on and taking off regular lower body clothing?  --  2  -JH     Bathing (including washing, rinsing, and drying)  --  2  -JH     Toileting (which includes using toilet bed pan or urinal)  --  2  -JH     Putting on and taking off regular upper body clothing  --  3  -JH     Taking care of personal grooming (such as brushing teeth)  --  3  -JH     Eating meals  --  4  -     AM-PAC 6 Clicks Score (OT)  --  16  -        Functional Assessment    Outcome Measure Options  AM-PAC 6 Clicks Basic Mobility (PT)  -  AM-[facility] 6 Clicks Daily Activity (OT)  -       User Key  (r) = Recorded By, (t) = Taken By, (c) = Cosigned By    Initials Name Provider Type     Timothy Doe PTA Physical Therapy Assistant     Brent Marte, OT Occupational Therapist         Time Calculation:   PT Charges     Row Name 01/10/20 1528 01/10/20 1044          Time Calculation    Start Time  1507  -  0936  -LR     Stop Time  1519  -  1009  -LR     Time Calculation (min)  12 min  -PAOLA  33 min  -LR     PT Received On  --  01/10/20  -LR     PT Goal Re-Cert Due Date  --  01/23/20  -LR        Time Calculation- PT    Total Timed Code Minutes- PT  12 minute(s)  -  --       User Key   (r) = Recorded By, (t) = Taken By, (c) = Cosigned By    Initials Name Provider Type    PAOLA Timothy Doe PTA Physical Therapy Assistant    LR Kirit Strickland Physical Therapist        Therapy Charges for Today     Code Description Service Date Service Provider Modifiers Qty    92597164432 HC PT THERAPEUTIC ACT EA 15 MIN 1/10/2020 Timothy Doe PTA GP 1          PT G-Codes  Outcome Measure Options: AM-PAC 6 Clicks Basic Mobility (PT)  AM-PAC 6 Clicks Score (PT): 17  AM-PAC 6 Clicks Score (OT): 16    Timothy Doe PTA  1/10/2020

## 2020-01-10 NOTE — PROGRESS NOTES
"  Subjective:  postop day #1 right colectomy.  Stable overnight making good urine output.  NG tube removed last evening and Nieves removed this morning.  Pain under control and good urine output.  No nausea no vomiting     /67   Pulse 82   Temp 98.2 °F (36.8 °C) (Oral)   Resp 14   Ht 162.6 cm (64.02\")   Wt 96 kg (211 lb 10.3 oz)   LMP  (LMP Unknown)   SpO2 94%   BMI 36.31 kg/m²     Lab Results (last 24 hours)     Procedure Component Value Units Date/Time    Basic Metabolic Panel [[id number]]  (Abnormal) Collected:  01/10/20 0428    Specimen:  Blood Updated:  01/10/20 0527     Glucose 133 mg/dL      BUN 5 mg/dL      Creatinine 0.66 mg/dL      Sodium 137 mmol/L      Potassium 3.7 mmol/L      Chloride 105 mmol/L      CO2 20.0 mmol/L      Calcium 7.5 mg/dL      eGFR Non African Amer 88 mL/min/1.73      BUN/Creatinine Ratio 7.6     Anion Gap 12.0 mmol/L     Narrative:       GFR Normal >60  Chronic Kidney Disease <60  Kidney Failure <15      CBC & Differential [[id number]] Collected:  01/10/20 0428    Specimen:  Blood Updated:  01/10/20 0457    Narrative:       The following orders were created for panel order CBC & Differential.  Procedure                               Abnormality         Status                     ---------                               -----------         ------                     CBC Auto Differential[[id number]]        Abnormal            Final result                 Please view results for these tests on the individual orders.    CBC Auto Differential [[id number]]  (Abnormal) Collected:  01/10/20 0428    Specimen:  Blood Updated:  01/10/20 0457     WBC 12.72 10*3/mm3      RBC 3.80 10*6/mm3      Hemoglobin 10.6 g/dL      Hematocrit 32.9 %      MCV 86.6 fL      MCH 27.9 pg      MCHC 32.2 g/dL      RDW 15.5 %      RDW-SD 48.9 fl      MPV 9.4 fL      Platelets 290 10*3/mm3      Neutrophil % 68.9 %      Lymphocyte % 22.8 %      Monocyte % 7.1 %      Eosinophil % 0.5 %      Basophil % 0.3 %      " Immature Grans % 0.4 %      Neutrophils, Absolute 8.77 10*3/mm3      Lymphocytes, Absolute 2.90 10*3/mm3      Monocytes, Absolute 0.90 10*3/mm3      Eosinophils, Absolute 0.06 10*3/mm3      Basophils, Absolute 0.04 10*3/mm3      Immature Grans, Absolute 0.05 10*3/mm3      nRBC 0.0 /100 WBC     Tissue Pathology Exam [[id number]] Collected:  01/09/20 0931    Specimen:  Tissue from Large Intestine, Right / Ascending Colon Updated:  01/09/20 0937          Current Medications:  Current Facility-Administered Medications   Medication Dose Route Frequency Provider Last Rate Last Dose   • acetaminophen (TYLENOL) tablet 1,000 mg  1,000 mg Oral Q6H Ozzy Young MD   1,000 mg at 01/10/20 0600   • alvimopan (ENTEREG) capsule 12 mg  12 mg Oral BID Ozzy Young MD       • dextrose 5 % and sodium chloride 0.45 % with KCl 20 mEq/L infusion  100 mL/hr Intravenous Continuous Ozzy Young  mL/hr at 01/09/20 2318 100 mL/hr at 01/09/20 2318   • enoxaparin (LOVENOX) syringe 40 mg  40 mg Subcutaneous Daily Ozzy Young MD       • famotidine (PEPCID) injection 20 mg  20 mg Intravenous Q12H Ozzy Young MD   20 mg at 01/09/20 2023   • Morphine sulfate PCA 1 mg/mL 30 mL syringe   Intravenous Continuous Ozzy Young MD       • naloxone (NARCAN) injection 0.1 mg  0.1 mg Intravenous Q5 Min PRN Ozzy Young MD       • ondansetron (ZOFRAN) injection 4 mg  4 mg Intravenous Q6H PRN Ozzy Young MD           Prior to admission medications:  Medications Prior to Admission   Medication Sig Dispense Refill Last Dose   • atorvastatin (LIPITOR) 40 MG tablet Take 1 tablet by mouth Daily. 90 tablet 2 1/8/2020 at 2100   • Cyanocobalamin (VITAMIN B-12) 1000 MCG sublingual tablet Place 1 tablet under your tongue daily for the rest of your life. (Patient taking differently: Place 1 tablet under the tongue Daily. Place 1 tablet under your tongue daily for the rest of your life.) 100 each 4 1/8/2020 at 0800   •  erythromycin (GEORGE-TAB) 500 MG EC tablet Take 1,000 mg by mouth. At 2100 and 2300 night prior to surgery   1/8/2020 at 2300   • fluticasone (FLONASE) 50 MCG/ACT nasal spray 2 sprays into the nostril(s) as directed by provider Daily. 3 bottle 3 1/8/2020 at 0800   • levothyroxine (SYNTHROID, LEVOTHROID) 88 MCG tablet Take 1 tablet by mouth Daily. 90 tablet 2 1/9/2020 at 0400   • Loratadine 10 MG capsule Take 1 tablet by mouth Daily As Needed.   1/8/2020 at 0800   • losartan-hydrochlorothiazide (HYZAAR) 100-25 MG per tablet Take 1 tablet by mouth Daily. 90 tablet 2 1/8/2020 at 0800   • metFORMIN ER (GLUCOPHAGE-XR) 500 MG 24 hr tablet Take 2 tablets by mouth Daily Before Supper. 180 tablet 3 1/8/2020 at 2000   • metoprolol tartrate (LOPRESSOR) 25 MG tablet TAKE 1/2 TABLET BY MOUTH EVERY 12 (TWELVE) HOURS. (Patient taking differently: Take 12.5 mg by mouth 2 (Two) Times a Day.) 15 tablet 0 1/9/2020 at 0400   • metroNIDAZOLE (FLAGYL) 500 MG tablet Take 2 tabs at 9 pm and 2 tabs at 11pm night before surgery (Patient taking differently: 1,000 mg. Take 2 tabs at 9 pm and 2 tabs at 11pm night before surgery) 4 tablet 0 1/8/2020 at 2300   • vitamin C (ASCORBIC ACID) 500 MG tablet Take 500 mg by mouth Daily.   1/8/2020 at 0800   • Blood Glucose Monitoring Suppl (ONE TOUCH ULTRA SYSTEM KIT) W/DEVICE kit Use for Home Glucose Monitoring 1 each 0 Unknown at Unknown time   • ONE TOUCH ULTRA TEST test strip TEST BLOOD SUGARS ONCE DAILY 100 each 3 Unknown at Unknown time       Physical exam: Alert nontoxic  Lungs clear  Abdomen with wound VAC in place and intact  Skin warm and dry  Extremities unremarkable  IV sites clean    Assessment : Doing well postop day #1.      Plan: We will get patient in chair and up out of bed this morning.  Probably move to floor later this morning or this afternoon.

## 2020-01-10 NOTE — THERAPY EVALUATION
Acute Care - Occupational Therapy Initial Evaluation  [facility]     Patient Name: Sharon Esposito  : 1945  MRN: [id number]  Today's Date: 1/10/2020  Onset of Illness/Injury or Date of Surgery: 20  Date of Referral to OT: 01/10/20  Referring Physician: ELLIE Mcneill MD    Admit Date: 2020       ICD-10-CM ICD-9-CM   1. Adenomatous polyp D36.9 229.9   2. Decreased functional mobility R26.89 781.99   3. Impaired mobility and activities of daily living Z74.09 799.89     Patient Active Problem List   Diagnosis   • Need for hepatitis C screening test   • Hyperlipidemia   • Acquired hypothyroidism   • Seasonal allergies   • Osteopenia   • Essential hypertension   • Edema   • Diverticulitis of colon   • Allergic rhinitis   • Postmenopausal   • Ingrown toenail   • Paresthesia of both feet   • Type 2 diabetes mellitus without complication, without long-term current use of insulin (CMS/HCC)   • Cobalamin deficiency   • Diabetic peripheral neuropathy (CMS/HCC)   • Encounter for screening for malignant neoplasm of colon   • Villous adenoma of colon 2019   • Adenomatous polyp     Past Medical History:   Diagnosis Date   • Acquired hypothyroidism     with hashimoto's thyroiditis      • Acute bronchitis    • Acute sinusitis    • Allergic rhinitis    • Bilateral hand pain    • Cough    • Diabetes mellitus (CMS/HCC)    • Diverticulitis of colon    • Edema     idiopathic state, history of      • Encounter for gynecological examination (general) (routine) without abnormal findings    • Encounter for screening for osteoporosis    • Essential hypertension    • Hemorrhoids     internal & external      • Hx of bone density study     DEXA BONE DENSITY APPENDICULAR: osteopenia, 2013   • Hx of screening mammography     MAMMOGRAM SCREENING: x2, 2014   • Hyperlipidemia     with elevated low density lipoprotein   • 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     history   •  Infection of skin and subcutaneous tissue     Infection of skin AND/OR subcutaneous tissue      • Onychogryposis     recurrent ingrown toenail      • Open wound of face    • Osteopenia    • Screening for malignant neoplasm of breast    • Screening for osteoporosis    • Seasonal allergies     Seasonal allergy      • Vitamin deficiency    • Wears glasses      Past Surgical History:   Procedure Laterality Date   • CATARACT EXTRACTION, BILATERAL     • COLON RESECTION N/A 1/9/2020    Procedure: COLON RESECTION LAPAROSCOPIC CONVERTED TO OPEN;  Surgeon: Ozzy Young MD;  Location: [facility] OR;  Service: General   • COLONOSCOPY      Approximately 04/2009   • COLONOSCOPY N/A 8/14/2019    Procedure: COLONOSCOPY Monday or Wed;  Surgeon: Tony Curiel MD;  Location: [facility] ENDOSCOPY;  Service: Gastroenterology   • COLONOSCOPY N/A 11/13/2019    Procedure: COLONOSCOPY A Wed in Nov;  Surgeon: Tony Curiel MD;  Location: [facility] ENDOSCOPY;  Service: Gastroenterology   • COLONOSCOPY N/A 1/9/2020    Procedure: COLONOSCOPY    DONE IN OR WITH ENDO             (colonoscpy first);  Surgeon: Ozzy Young MD;  Location: [facility] OR;  Service: General   • ENDOSCOPY AND COLONOSCOPY  04/20/2009    A few small diverticula in the sigmoid & the descending colon. Small internal & external hemorrhoids were present. Colonoscopy otherwise normal   • INJECTION OF MEDICATION  06/23/2015    Celestone (betamethasone) x2   • INJECTION OF MEDICATION  12/08/2014    Toradol    • PAP SMEAR  06/23/2009    Negative for intraepithelial lesion or malignancy   • SKIN BIOPSY  09/30/2010    L neck lesion- seborrheic keratosis. right infraorbital-seborrheic keratosis, right lateral orbital- intradermal nevus. forehead lesion-early squamous papilloma          OT ASSESSMENT FLOWSHEET (last 12 hours)      Occupational Therapy Evaluation     Row Name 01/10/20 0937                   OT Evaluation Time/Intention    Subjective Information  --  -         Document Type  evaluation  -        Mode of Treatment  co-treatment;physical therapy;occupational therapy  -        Patient Effort  good  -           General Information    Patient Profile Reviewed?  yes  -        Onset of Illness/Injury or Date of Surgery  01/09/20  -        Referring Physician  ELLIE Mcneill MD  -        Patient Observations  alert;cooperative;agree to therapy  -        Patient/Family Observations  Friend in room  -        General Observations of Patient  Reclined in w/c  -        Prior Level of Function  independent:;all household mobility;community mobility;gait;bed mobility;ADL's;driving  -        Existing Precautions/Restrictions  fall;oxygen therapy device and L/min  -        Risks Reviewed  patient:;LOB;nausea/vomiting;dizziness;increased discomfort;change in vital signs;increased drainage;lines disloged  -        Benefits Reviewed  patient:;improve function;increase independence;increase strength;increase balance;decrease pain;decrease risk of DVT;improve skin integrity;increase knowledge  -           Relationship/Environment    Primary Source of Support/Comfort  other (see comments)  -        Name of Support/Comfort Primary Source  friend Royce  -        Lives With  alone  -        Family Caregiver if Needed  [facility]        Primary Roles/Responsibilities  retired  [employer]           Resource/Environmental Concerns    Current Living Arrangements  home/apartment/condo  -        Resource/Environmental Concerns  none  -        Transportation Concerns  car, none  -           Home Main Entrance    Number of Stairs, Main Entrance  three  -        Stair Railings, Main Entrance  none  -           Stairs Within Home, Primary    Number of Stairs, Within Home, Primary  none  -           Cognitive Assessment/Interventions    Additional Documentation  Cognitive Assessment/Intervention (Group)  -           Cognitive Assessment/Intervention- PT/OT    Affect/Mental  Status (Cognitive)  [facility]  -        Orientation Status (Cognition)  oriented x 4  -JH        Follows Commands (Cognition)  [facility]  -        Cognitive Function (Cognitive)  [facility]  -           Safety Issues, Functional Mobility    Safety Issues Affecting Function (Mobility)  ability to follow commands;awareness of need for assistance;impulsivity;insight into deficits/self awareness;safety precaution awareness;safety precautions follow-through/compliance  -        Impairments Affecting Function (Mobility)  coordination;balance;endurance/activity tolerance;pain;postural/trunk control;strength  -           Bed Mobility Assessment/Treatment    Bed Mobility Assessment/Treatment  supine-sit-supine  -        Supine-Sit-Supine [place] (Bed Mobility)  not tested  -           Bed-Chair Transfer    Bed-Chair [place] (Transfers)  minimum assist (75% patient effort)  -           Sit-Stand Transfer    Sit-Stand [place] (Transfers)  minimum assist (75% patient effort)  -           BADL Safety/Performance    Impairments, BADL Safety/Performance  balance;endurance/activity tolerance;trunk/postural control;strength;pain  -           General ROM    GENERAL ROM COMMENTS  BUE ROM [facility]  -           MMT (Manual Muscle Testing)    General MMT Comments  BUE MMT Strength 4/5 [facility]           Positioning and Restraints    Pre-Treatment Position  sitting in chair/recliner  -        Post Treatment Position  chair  -        In Chair  reclined;call light within reach;encouraged to call for assist;exit alarm on;notified [facility]           Pain Assessment    Additional Documentation  Pain Scale: Numbers Pre/Post-Treatment (Group)  -           Pain Scale: Numbers Pre/Post-Treatment    Pain Scale: Numbers, Pretreatment  7/10  -        Pain Scale: Numbers, Post-Treatment  7/10  -        Pain Location - Side  Bilateral  -        Pain Location - Orientation  anterior  -        Pain Location  abdomen  -         Pain Intervention(s)  Ambulation/increased activity;Repositioned;Emotional support  -           Wound 01/09/20 0816 abdomen Incision    Wound - Properties Group Date first assessed: 01/09/20  -TR Time first assessed: 0816  -TR Present on Hospital Admission: N  -TR Location: abdomen  -TR Primary Wound Type: Incision  -TR       Plan of Care Review    Plan of Care Reviewed With  patient  -           Clinical Impression (OT)    Date of Referral to OT  01/10/20  -        OT Diagnosis  Impaired Mobility and ADL's  -        Criteria for Skilled Therapeutic Interventions Met (OT Eval)  yes;treatment indicated  -        Rehab Potential (OT Eval)  good, to achieve stated therapy goals  -        Therapy Frequency (OT Eval)  daily  -        Predicted Duration of Therapy Intervention (Therapy Eval)  Until goals met or d/c  -        Care Plan Review (OT)  evaluation/treatment results reviewed;care plan/treatment goals reviewed;risks/benefits reviewed;current/potential barriers reviewed;patient/other agree to care plan  -        Anticipated Discharge Disposition (OT)  anticipate therapy at next level of care  -           Vital Signs    Pre Systolic BP Rehab  173  -JH        Pre Treatment Diastolic BP  74  -JH        Intra Systolic BP Rehab  169  -JH        Intra Treatment Diastolic BP  71  -JH        Post Systolic BP Rehab  187  -JH        Post Treatment Diastolic BP  78  -        Pretreatment Heart Rate (beats/min)  82  -JH        Intratreatment Heart Rate (beats/min)  83  -JH        Posttreatment Heart Rate (beats/min)  81  -JH        Pre SpO2 (%)  92  -JH        O2 Delivery Pre Treatment  room air  -        Intra SpO2 (%)  91  -JH        O2 Delivery Intra Treatment  room air  -        Post SpO2 (%)  92  -        O2 Delivery Post Treatment  room air  -        Pre Patient Position  Sitting  -        Intra Patient Position  Standing  -        Post Patient Position  Sitting  -            Planned OT Interventions    Planned Therapy Interventions (OT Eval)  activity tolerance training;adaptive equipment training;transfer/mobility retraining;strengthening exercise;ROM/therapeutic exercise;patient/caregiver education/training;occupation/activity based interventions;neuromuscular control/coordination retraining;functional balance retraining;BADL retraining  -JH           OT Goals    Transfer Goal Selection (OT)  transfer, OT goal 1  -JH        Bathing Goal Selection (OT)  bathing, OT goal 1  -JH        Dressing Goal Selection (OT)  dressing, OT goal 1  -JH        Toileting Goal Selection (OT)  toileting, OT goal 1  -JH        Grooming Goal Selection (OT)  grooming, OT goal 1  -JH        Activity Tolerance Goal Selection (OT)  activity tolerance, OT goal 1  -JH        Additional Documentation  Activity Tolerance Goal Selection (OT) (Row);Grooming Goal Selection (OT) (Row)  -           Transfer Goal 1 (OT)    Activity/Assistive Device (Transfer Goal 1, OT)  transfers, all  -JH        [place] Level/Cues Needed (Transfer Goal 1, OT)  verbal cues required;tactile cues required;set-up required;supervision required  -        Time Frame (Transfer Goal 1, OT)  long term goal (LTG);by discharge  -        Progress/Outcome (Transfer Goal 1, OT)  goal not met  -JH           Bathing Goal 1 (OT)    Activity/Assistive Device (Bathing Goal 1, OT)  bathing skills, all  -        [place] Level/Cues Needed (Bathing Goal 1, OT)  standby assist;verbal cues required;tactile cues required;set-up required  -        Time Frame (Bathing Goal 1, OT)  long term goal (LTG);by discharge  -        Progress/Outcomes (Bathing Goal 1, OT)  goal not met  -           Dressing Goal 1 (OT)    Activity/Assistive Device (Dressing Goal 1, OT)  dressing skills, all  -JH        [place]/Cues Needed (Dressing Goal 1, OT)  standby assist;verbal cues required;tactile cues required;set-up required  -        Time Frame  (Dressing Goal 1, OT)  long term goal (LTG);by discharge  -        Progress/Outcome (Dressing Goal 1, OT)  goal not met  -JH           Toileting Goal 1 (OT)    Activity/Device (Toileting Goal 1, OT)  toileting skills, all  -        [place] Level/Cues Needed (Toileting Goal 1, OT)  supervision required;verbal cues required;tactile cues required;set-up required  -        Time Frame (Toileting Goal 1, OT)  long term goal (LTG);by discharge  -        Progress/Outcome (Toileting Goal 1, OT)  goal not met  -JH           Grooming Goal 1 (OT)    Activity/Device (Grooming Goal 1, OT)  grooming skills, all  -        [place] (Grooming Goal 1, OT)  supervision required;verbal cues required;tactile cues required;set-up required  -        Time Frame (Grooming Goal 1, OT)  long term goal (LTG);by discharge  -        Progress/Outcome (Grooming Goal 1, OT)  goal not met  -            Activity Tolerance Goal 1 (OT)    Activity Level (Endurance Goal 1, OT)  -- 20 min functional activity with 1-2 rest breaks   -        Time Frame (Activity Tolerance Goal 1, OT)  long term goal (LTG);by discharge  -        Progress/Outcome (Activity Tolerance Goal 1, OT)  goal not met  -JH           Living Environment    Home Accessibility  stairs to enter home;tub/shower is not walk in  -          User Key  (r) = Recorded By, (t) = Taken By, (c) = Cosigned By    Initials Name Effective Dates    TR Ariane Menendez RN 10/17/16 -      Brent Marte OT 09/10/19 -                OT Recommendation and Plan  Outcome Summary/Treatment Plan (OT)  Anticipated Discharge Disposition (OT): anticipate therapy at next level of care  Planned Therapy Interventions (OT Eval): activity tolerance training, adaptive equipment training, transfer/mobility retraining, strengthening exercise, ROM/therapeutic exercise, patient/caregiver education/training, occupation/activity based interventions, neuromuscular control/coordination retraining,  "functional balance retraining, BADL retraining  Therapy Frequency (OT Eval): daily  Plan of Care Review  Plan of Care Reviewed With: patient  Plan of Care Reviewed With: patient  Outcome Summary: OT eval completed on this date as co-eval with PT. Pt pleasant and agreeable to OT. Transfers: Min A Funct Mob: Min A with HHA Pt complaining of feeling \"silly headed\" throughout evaluation. Pt reporting it got worse when standing and taking some steps. Pt returned to chair and reclined, improving symptoms. Pt demos functional deficits d/t decreased activity tolerance, decreased strength, decreased balance, and increased pain. Pt would benefit from continued skilled OT to address functional deficits. Anticipate therapy at next level of care.    Outcome Measures     Row Name 01/10/20 0937             How much help from another is currently needed...    Putting on and taking off regular lower body clothing?  2  -JH      Bathing (including washing, rinsing, and drying)  2  -JH      Toileting (which includes using toilet bed pan or urinal)  2  -JH      Putting on and taking off regular upper body clothing  3  -JH      Taking care of personal grooming (such as brushing teeth)  3  -JH      Eating meals  4  -      AM-PAC 6 Clicks Score (OT)  16  -         Functional Assessment    Outcome Measure Options  AM-PAC 6 Clicks Daily Activity (OT)  -        User Key  (r) = Recorded By, (t) = Taken By, (c) = Cosigned By    Initials Name Provider Type    Brent Al OT Occupational Therapist          Time Calculation:   Time Calculation- OT     Row Name 01/10/20 1318             Time Calculation-     OT Start Time  0936  -      OT Stop Time  1010  -      OT Time Calculation (min)  34 min  -      OT Received On  01/10/20  -      OT Goal Re-Cert Due Date  01/23/20  -        User Key  (r) = Recorded By, (t) = Taken By, (c) = Cosigned By    Initials Name Provider Type    Brent Al OT Occupational Therapist    "     Therapy Charges for Today     Code Description Service Date Service Provider Modifiers Qty    89440659783 HC OT EVAL MOD COMPLEXITY 2 1/10/2020 Brent Marte, OT GO 1               Brent Marte OT  1/10/2020

## 2020-01-10 NOTE — PLAN OF CARE
"  Problem: Patient Care Overview  Goal: Plan of Care Review  Outcome: Ongoing (interventions implemented as appropriate)  Flowsheets (Taken 1/10/2020 1041)  Outcome Summary: PT eval completed on this date. Patient was pleasant and agreeable to therapy. She does report feeling \"foggy head\" throughout session. Bed mobility: deferred due to patient OOB in chair. Transfers: Yandy for sit<>stand transfer Gait: MinAx1 12 ft as fwd/bwk ambulation with HHA. Balance: Patient will complete tinetti when more appropriate. Patient would benefit from skilled PT to address deficits in functional mobility. Patient will need to ambulate >1000 ft to become community ambulator and reach her prior level of function. Anticipate therapy at the next level.     "

## 2020-01-10 NOTE — PROGRESS NOTES
Discharge Planning Assessment  [facility]     Patient Name: Sharon Esposito  MRN: [id number]  Today's Date: 1/10/2020    Admit Date: 1/9/2020    Discharge Needs Assessment     Row Name 01/10/20 1009       Living Environment    Lives With  alone    Current Living Arrangements  home/apartment/condo    Primary Care Provided by  self    Provides Primary Care For  no one    Family Caregiver if Needed  friend(s)    Quality of Family Relationships  helpful;involved;supportive    Able to Return to Prior Arrangements  yes    Living Arrangement Comments  Pt resides at home alone. Pt voiced good support system.        Resource/Environmental Concerns    Resource/Environmental Concerns  none    Transportation Concerns  car, none       Transition Planning    Patient/Family Anticipates Transition to  home    Patient/Family Anticipated Services at Transition  -- Pt unsure of needs at this time.     Transportation Anticipated  family or friend will provide       Discharge Needs Assessment    Concerns to be Addressed  adjustment to diagnosis/illness    Equipment Currently Used at Home  none    Equipment Needed After Discharge  -- Awaiting PT/OT recomendations.     Discharge Facility/Level of Care Needs  -- Awaiting PT/OT recomendations.     Current Discharge Risk  chronically ill;lives alone        Discharge Plan     Row Name 01/10/20 1011       Plan    Plan Comments  LSW assessment complete. Pt resides at home alone. Pt voiced good support system. Pt reports being independent prior to hospitalization. Pt's goal is to return home at d/c. Pt unsure of needs at this time but did not anticipate any needs. LSW awaiting PT/OT recomendations. LSW/case mgt will follow up as consulted and complete arrangements as ordered. Rodolfo Ceja LSW        Destination      Coordination has not been started for this encounter.      Durable Medical Equipment      Coordination has not been started for this encounter.      Dialysis/Infusion       Coordination has not been started for this encounter.      Home Medical Care      Coordination has not been started for this encounter.      Therapy      Coordination has not been started for this encounter.      Community Resources      Coordination has not been started for this encounter.          Demographic Summary     Row Name 01/10/20 1008       General Information    Admission Type  inpatient    Referral Source  high risk screening    Reason for Consult  discharge planning    Preferred Language  English     Used During This Interaction  no       Contact Information    Contact Information Obtained for          Functional Status     Row Name 01/10/20 1008       Functional Status    Usual Activity Tolerance  moderate    Current Activity Tolerance  fair       Functional Status, IADL    Medications  independent Pt uses [facility] pharmacy    Meal Preparation  independent    Housekeeping  independent    Laundry  independent    Shopping  independent       Mental Status Summary    Recent Changes in Mental Status/Cognitive Functioning  no changes        Psychosocial    No documentation.       Abuse/Neglect    No documentation.       Legal    No documentation.       Substance Abuse    No documentation.       Patient Forms    No documentation.           RANULFO Santa

## 2020-01-10 NOTE — THERAPY EVALUATION
Patient Name: Sharon Esposito  : 1945    MRN: [id number]                              Today's Date: 1/10/2020       Admit Date: 2020    Visit Dx:     ICD-10-CM ICD-9-CM   1. Adenomatous polyp D36.9 229.9   2. Decreased functional mobility R26.89 781.99     Patient Active Problem List   Diagnosis   • Need for hepatitis C screening test   • Hyperlipidemia   • Acquired hypothyroidism   • Seasonal allergies   • Osteopenia   • Essential hypertension   • Edema   • Diverticulitis of colon   • Allergic rhinitis   • Postmenopausal   • Ingrown toenail   • Paresthesia of both feet   • Type 2 diabetes mellitus without complication, without long-term current use of insulin (CMS/HCC)   • Cobalamin deficiency   • Diabetic peripheral neuropathy (CMS/HCC)   • Encounter for screening for malignant neoplasm of colon   • Villous adenoma of colon 2019   • Adenomatous polyp     Past Medical History:   Diagnosis Date   • Acquired hypothyroidism     with hashimoto's thyroiditis      • Acute bronchitis    • Acute sinusitis    • Allergic rhinitis    • Bilateral hand pain    • Cough    • Diabetes mellitus (CMS/HCC)    • Diverticulitis of colon    • Edema     idiopathic state, history of      • Encounter for gynecological examination (general) (routine) without abnormal findings    • Encounter for screening for osteoporosis    • Essential hypertension    • Hemorrhoids     internal & external      • Hx of bone density study     DEXA BONE DENSITY APPENDICULAR: osteopenia, 2013   • Hx of screening mammography     MAMMOGRAM SCREENING: x2, 2014   • Hyperlipidemia     with elevated low density lipoprotein   • 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     history   • Infection of skin and subcutaneous tissue     Infection of skin AND/OR subcutaneous tissue      • Onychogryposis     recurrent ingrown toenail      • Open wound of face    • Osteopenia    • Screening for malignant neoplasm  of breast    • Screening for osteoporosis    • Seasonal allergies     Seasonal allergy      • Vitamin deficiency    • Wears glasses      Past Surgical History:   Procedure Laterality Date   • CATARACT EXTRACTION, BILATERAL     • COLON RESECTION N/A 1/9/2020    Procedure: COLON RESECTION LAPAROSCOPIC CONVERTED TO OPEN;  Surgeon: Ozzy Young MD;  Location: [facility] OR;  Service: General   • COLONOSCOPY      Approximately 04/2009   • COLONOSCOPY N/A 8/14/2019    Procedure: COLONOSCOPY Monday or Wed;  Surgeon: Tony Curiel MD;  Location: [facility] ENDOSCOPY;  Service: Gastroenterology   • COLONOSCOPY N/A 11/13/2019    Procedure: COLONOSCOPY A Wed in Nov;  Surgeon: Tony Curiel MD;  Location: [facility] ENDOSCOPY;  Service: Gastroenterology   • COLONOSCOPY N/A 1/9/2020    Procedure: COLONOSCOPY    DONE IN OR WITH ENDO             (colonoscpy first);  Surgeon: Ozzy Young MD;  Location: [facility] OR;  Service: General   • ENDOSCOPY AND COLONOSCOPY  04/20/2009    A few small diverticula in the sigmoid & the descending colon. Small internal & external hemorrhoids were present. Colonoscopy otherwise normal   • INJECTION OF MEDICATION  06/23/2015    Celestone (betamethasone) x2   • INJECTION OF MEDICATION  12/08/2014    Toradol    • PAP SMEAR  06/23/2009    Negative for intraepithelial lesion or malignancy   • SKIN BIOPSY  09/30/2010    L neck lesion- seborrheic keratosis. right infraorbital-seborrheic keratosis, right lateral orbital- intradermal nevus. forehead lesion-early squamous papilloma     General Information     Row Name 01/10/20 0936          PT Evaluation Time/Intention    Document Type  evaluation  -LR     Mode of Treatment  co-treatment;physical therapy;occupational therapy  -LR     Row Name 01/10/20 0936          General Information    Patient Profile Reviewed?  yes  -LR     Prior Level of Function  independent:;all household mobility;community mobility;gait;bed mobility;ADL's;driving  -LR      Existing Precautions/Restrictions  fall;oxygen therapy device and L/min  -LR     Row Name 01/10/20 0936          Relationship/Environment    Lives With  alone  -LR     Row Name 01/10/20 0936          Resource/Environmental Concerns    Current Living Arrangements  home/apartment/condo  -LR     Row Name 01/10/20 0936          Home Main Entrance    Number of Stairs, Main Entrance  three  -LR     Stair Railings, Main Entrance  none  -LR     Row Name 01/10/20 0936          Stairs Within Home, Primary    Number of Stairs, Within Home, Primary  none  -LR     Row Name 01/10/20 0936          Cognitive Assessment/Intervention- PT/OT    Orientation Status (Cognition)  oriented x 4  -LR     Row Name 01/10/20 0936          Safety Issues, Functional Mobility    Safety Issues Affecting Function (Mobility)  ability to follow commands;insight into deficits/self awareness;awareness of need for assistance;at risk behavior observed;safety precaution awareness;safety precautions follow-through/compliance  -LR     Impairments Affecting Function (Mobility)  coordination;balance;endurance/activity tolerance;pain;postural/trunk control;strength  -LR       User Key  (r) = Recorded By, (t) = Taken By, (c) = Cosigned By    Initials Name Provider Type    LR Kirit Strickland Physical Therapist        Mobility     Row Name 01/10/20 0936          Bed Mobility Assessment/Treatment    Bed Mobility Assessment/Treatment  supine-sit-supine  -LR     Supine-Sit-Supine [place] (Bed Mobility)  not tested  -     Row Name 01/10/20 0936          Bed-Chair Transfer    Bed-Chair [place] (Transfers)  --  -     Row Name 01/10/20 0936          Sit-Stand Transfer    Sit-Stand [place] (Transfers)  minimum assist (75% patient effort)  -     Row Name 01/10/20 0936          Gait/Stairs Assessment/Training    Gait/Stairs Assessment/Training  gait/ambulation independence  -     [place] Level (Gait)  minimum assist (75% patient effort)  -      Distance in Feet (Gait)  12 ftx2  -LR       User Key  (r) = Recorded By, (t) = Taken By, (c) = Cosigned By    Initials Name Provider Type    Kirit Jacome Physical Therapist        Obj/Interventions     Row Name 01/10/20 0936          General ROM    GENERAL ROM COMMENTS  BLE grossly WFL  -LR     Row Name 01/10/20 0936          MMT (Manual Muscle Testing)    General MMT Comments  BLE grossly 5/5  -LR       User Key  (r) = Recorded By, (t) = Taken By, (c) = Cosigned By    Initials Name Provider Type    LR Kirit Strickland Physical Therapist        Goals/Plan     Row Name 01/10/20 0936          Bed Mobility Goal 1 (PT)    Activity/Assistive Device (Bed Mobility Goal 1, PT)  sit to supine;supine to sit  -LR     [place] Level/Cues Needed (Bed Mobility Goal 1, PT)  independent  -LR     Time Frame (Bed Mobility Goal 1, PT)  by discharge  -LR     Progress/Outcomes (Bed Mobility Goal 1, PT)  goal not met  -LR     Row Name 01/10/20 0936          Transfer Goal 1 (PT)    Activity/Assistive Device (Transfer Goal 1, PT)  bed-to-chair/chair-to-bed  -LR     [place] Level/Cues Needed (Transfer Goal 1, PT)  independent;conditional independence  -LR     Time Frame (Transfer Goal 1, PT)  by discharge  -LR     Progress/Outcome (Transfer Goal 1, PT)  goal not met  -LR     Row Name 01/10/20 0936          Gait Training Goal 1 (PT)    Activity/Assistive Device (Gait Training Goal 1, PT)  gait (walking locomotion);assistive device use;backward stepping  -LR     [place] Level (Gait Training Goal 1, PT)  independent  -LR     Distance (Gait Goal 1, PT)  600 ftx1  -LR     Time Frame (Gait Training Goal 1, PT)  by discharge  -LR     Progress/Outcome (Gait Training Goal 1, PT)  goal not met  -LR     Row Name 01/10/20 0936          Stairs Goal 1 (PT)    Activity/Assistive Device (Stairs Goal 1, PT)  stairs, all skills;ascending stairs;descending stairs  -LR     [place] Level/Cues Needed (Stairs Goal 1, PT)  independent  -LR   "   Number of Stairs (Stairs Goal 1, PT)  3   -LR     Time Frame (Stairs Goal 1, PT)  by discharge  -LR     Progress/Outcome (Stairs Goal 1, PT)  goal not met  -LR     Row Name 01/10/20 0936          Patient Education Goal (PT)    Activity (Patient Education Goal, PT)  Patient will understand the how to implent intentional walking program to improve CV endurance and strength  -LR     [place]/Cues/Accuracy (Memory Goal 2, PT)  demonstrates adequately;verbalizes understanding  -LR     Time Frame (Patient Education Goal, PT)  by discharge  -LR     Progress/Outcome (Patient Education Goal, PT)  goal not met  -LR       User Key  (r) = Recorded By, (t) = Taken By, (c) = Cosigned By    Initials Name Provider Type    LR Kirit Strickland Physical Therapist        Clinical Impression     Row Name 01/10/20 0936          Pain Assessment    Additional Documentation  Pain Scale: Numbers Pre/Post-Treatment (Group)  -LR     Row Name 01/10/20 0936          Pain Scale: Numbers Pre/Post-Treatment    Pain Scale: Numbers, Pretreatment  7/10  -LR     Pain Scale: Numbers, Post-Treatment  7/10  -LR     Pain Location - Side  Bilateral  -LR     Pain Location - Orientation  anterior  -LR     Pain Location  abdomen  -LR     Pain Intervention(s)  Ambulation/increased activity;Distraction;Rest  -LR     Row Name 01/10/20 0936          Plan of Care Review    Plan of Care Reviewed With  patient;friend  -LR     Row Name 01/10/20 0936          Physical Therapy Clinical Impression    Criteria for Skilled Interventions Met (PT Clinical Impression)  yes;treatment indicated  -LR     Rehab Potential (PT Clinical Summary)  good, to achieve stated therapy goals  -LR     Predicted Duration of Therapy (PT)  PT eval completed on this date. Patient was pleasant and agreeable to therapy. She does report feeling \"foggy head\" throughout session. Bed mobility: deferred due to patient OOB in chair. Transfers: Yandy for sit<>stand transfer Gait: MinAx1 12 ft as " fwd/bwk ambulation with HHA. Balance: Patient will complete tinetti when more appropriate. Patient would benefit from skilled PT to address deficits in functional mobility. Patient will need to ambulate >1000 ft to become community ambulator and reach her prior level of function. Anticipate therapy at the next level.  -LR     Row Name 01/10/20 0936          Vital Signs    Pre Systolic BP Rehab  173  -LR     Pre Treatment Diastolic BP  74  -LR     Intra Systolic BP Rehab  169  -LR     Intra Treatment Diastolic BP  71  -LR     Post Systolic BP Rehab  187  -LR     Post Treatment Diastolic BP  78  -LR     Pretreatment Heart Rate (beats/min)  82  -LR     Intratreatment Heart Rate (beats/min)  83  -LR     Posttreatment Heart Rate (beats/min)  81  -LR     Pre SpO2 (%)  92  -LR     O2 Delivery Pre Treatment  room air  -LR     Intra SpO2 (%)  91  -LR     O2 Delivery Intra Treatment  room air  -LR     Post SpO2 (%)  92  -LR     O2 Delivery Post Treatment  room air  -LR     Pre Patient Position  Sitting  -LR     Intra Patient Position  Standing  -LR     Post Patient Position  Sitting  -LR     Row Name 01/10/20 0936          Positioning and Restraints    Pre-Treatment Position  sitting in chair/recliner  -LR     Post Treatment Position  chair  -LR     In Chair  call light within reach;encouraged to call for assist  -LR       User Key  (r) = Recorded By, (t) = Taken By, (c) = Cosigned By    Initials Name Provider Type    LR Kirit Strickland Physical Therapist        Outcome Measures     Row Name 01/10/20 0936          How much help from another person do you currently need...    Turning from your back to your side while in flat bed without using bedrails?  2  -LR     Moving from lying on back to sitting on the side of a flat bed without bedrails?  2  -LR     Moving to and from a bed to a chair (including a wheelchair)?  3  -LR     Standing up from a chair using your arms (e.g., wheelchair, bedside chair)?  3  -LR     Climbing 3-5  "steps with a railing?  2  -LR     To walk in hospital room?  3  -LR     AM-PAC 6 Clicks Score (PT)  15  -LR     Row Name 01/10/20 0936          Functional Assessment    Outcome Measure Options  AM-PAC 6 Clicks Basic Mobility (PT);Tinetti  -LR       User Key  (r) = Recorded By, (t) = Taken By, (c) = Cosigned By    Initials Name Provider Type    LR Kirit Strickland Physical Therapist          PT Recommendation and Plan  Planned Therapy Interventions (PT Eval): balance training, bed mobility training, gait training, joint mobilization, home exercise program, lumbar stabilization, manual therapy techniques, neuromuscular re-education, patient/family education, postural re-education, prosthetic fitting/training, ROM (range of motion), stair training, strengthening, stretching, transfer training, orthotic fitting/training, vestibular therapy  Outcome Summary/Treatment Plan (PT)  Anticipated Discharge Disposition (PT): anticipate therapy at next level of care  Plan of Care Reviewed With: patient, friend  Outcome Summary: PT eval completed on this date. Patient was pleasant and agreeable to therapy. She does report feeling \"foggy head\" throughout session. Bed mobility: deferred due to patient OOB in chair. Transfers: Yandy for sit<>stand transfer Gait: MinAx1 12 ft as fwd/bwk ambulation with HHA. Balance: Patient will complete tinetti when more appropriate. Patient would benefit from skilled PT to address deficits in functional mobility. Patient will need to ambulate >1000 ft to become community ambulator and reach her prior level of function. Anticipate therapy at the next level.     Time Calculation:   PT Charges     Row Name 01/10/20 1044             Time Calculation    Start Time  0936  -LR      Stop Time  1009  -LR      Time Calculation (min)  33 min  -LR      PT Received On  01/10/20  -LR      PT Goal Re-Cert Due Date  01/23/20  -LR        User Key  (r) = Recorded By, (t) = Taken By, (c) = Cosigned By    Initials Name " Provider Type    LR Kirit Strickland Physical Therapist        Therapy Charges for Today     Code Description Service Date Service Provider Modifiers Qty    33551556146 HC PT EVAL MOD COMPLEXITY 2 1/10/2020 Kirit Strickland GP 1          PT G-Codes  Outcome Measure Options: AM-PAC 6 Clicks Basic Mobility (PT), Tinetti  AM-PAC 6 Clicks Score (PT): 15    Kirit Strickland  1/10/2020

## 2020-01-10 NOTE — PLAN OF CARE
"  Problem: Patient Care Overview  Goal: Plan of Care Review  Outcome: Ongoing (interventions implemented as appropriate)  Flowsheets (Taken 1/10/2020 1321)  Plan of Care Reviewed With: patient  Outcome Summary: OT eval completed on this date as co-eval with PT. Pt pleasant and agreeable to OT. Transfers: Min A Funct Mob: Min A with HHA Pt complaining of feeling \"silly headed\" throughout evaluation. Pt reporting it got worse when standing and taking some steps. Pt returned to chair and reclined, improving symptoms. Pt demos functional deficits d/t decreased activity tolerance, decreased strength, decreased balance, and increased pain. Pt would benefit from continued skilled OT to address functional deficits. Anticipate therapy at next level of care.     "

## 2020-01-11 PROCEDURE — 25010000002 ENOXAPARIN PER 10 MG: Performed by: SURGERY

## 2020-01-11 PROCEDURE — 97116 GAIT TRAINING THERAPY: CPT

## 2020-01-11 PROCEDURE — 99024 POSTOP FOLLOW-UP VISIT: CPT | Performed by: SURGERY

## 2020-01-11 PROCEDURE — 97535 SELF CARE MNGMENT TRAINING: CPT

## 2020-01-11 RX ADMIN — FAMOTIDINE 20 MG: 10 INJECTION INTRAVENOUS at 08:46

## 2020-01-11 RX ADMIN — ALVIMOPAN 12 MG: 12 CAPSULE ORAL at 21:18

## 2020-01-11 RX ADMIN — ALVIMOPAN 12 MG: 12 CAPSULE ORAL at 08:46

## 2020-01-11 RX ADMIN — ENOXAPARIN SODIUM 40 MG: 40 INJECTION SUBCUTANEOUS at 08:46

## 2020-01-11 RX ADMIN — ACETAMINOPHEN 1000 MG: 500 TABLET ORAL at 08:46

## 2020-01-11 RX ADMIN — POTASSIUM CHLORIDE, DEXTROSE MONOHYDRATE AND SODIUM CHLORIDE 100 ML/HR: 150; 5; 450 INJECTION, SOLUTION INTRAVENOUS at 16:45

## 2020-01-11 RX ADMIN — ACETAMINOPHEN 1000 MG: 500 TABLET ORAL at 21:18

## 2020-01-11 RX ADMIN — ACETAMINOPHEN 1000 MG: 500 TABLET ORAL at 14:39

## 2020-01-11 RX ADMIN — POTASSIUM CHLORIDE, DEXTROSE MONOHYDRATE AND SODIUM CHLORIDE 100 ML/HR: 150; 5; 450 INJECTION, SOLUTION INTRAVENOUS at 05:33

## 2020-01-11 RX ADMIN — FAMOTIDINE 20 MG: 10 INJECTION INTRAVENOUS at 21:18

## 2020-01-11 RX ADMIN — ACETAMINOPHEN 1000 MG: 500 TABLET ORAL at 01:11

## 2020-01-11 NOTE — PLAN OF CARE
Patient has ambulated twice this shift and plans to ambulate again this am.  Pain is well controlled with tylenol she is not using morphine PCA.

## 2020-01-11 NOTE — PLAN OF CARE
Problem: Patient Care Overview  Goal: Plan of Care Review  Outcome: Ongoing (interventions implemented as appropriate)  Flowsheets (Taken 1/11/2020 1221)  Outcome Summary: Pt pleasant and agreeable to tx. Pt longsitting with HOB elevated. Pt initial /93 with nsg notified. Nsg intered room and took BP which was 203/93. Nsg suggested ambulation could bring BP down. Pt agreed to fx'al mobility and toileting. Pt conditional I for bed mobility to include sup<>sit<>sup and bridging to HOB with use of bedrails. Pt CGA for toilet t/f and set up with supervision for all toileting to include toilet management and magdi care. Pt performed fx'al mobility with CGA. Pt BP down to 188/79 when returning to bed in the supine position.

## 2020-01-11 NOTE — PLAN OF CARE
Problem: Patient Care Overview  Goal: Plan of Care Review  Outcome: Ongoing (interventions implemented as appropriate)  Flowsheets (Taken 1/11/2020 9628)  Progress: improving  Plan of Care Reviewed With: patient  Outcome Summary: Pt able to stand with CGA and amb with CGA/Min of 1 and another for equipment for 312ft with good effort. Pt's BP elevated but nsg aware and okayed pt to amb with pt nonsymptomatic. Pt would cont to benefit from therapy upon DC.

## 2020-01-11 NOTE — PLAN OF CARE
Problem: Patient Care Overview  Goal: Plan of Care Review  Outcome: Ongoing (interventions implemented as appropriate)  Flowsheets  Taken 1/11/2020 0933 by Paolo Gonzales PTA  Progress: improving  Taken 1/11/2020 1747 by Jose Rafael Guzman RN  Plan of Care Reviewed With: patient  Outcome Summary: pt vss. Pt ambulating and pt only complains of soreness

## 2020-01-11 NOTE — THERAPY TREATMENT NOTE
Acute Care - Physical Therapy Treatment Note  [facility]     Patient Name: Sharon Esposito  : 1945  MRN: [id number]  Today's Date: 2020  Onset of Illness/Injury or Date of Surgery: 20     Referring Physician: ELLIE Mcneill MD    Admit Date: 2020    Visit Dx:    ICD-10-CM ICD-9-CM   1. Adenomatous polyp D36.9 229.9   2. Decreased functional mobility R26.89 781.99   3. Impaired mobility and activities of daily living Z74.09 799.89     Patient Active Problem List   Diagnosis   • Need for hepatitis C screening test   • Hyperlipidemia   • Acquired hypothyroidism   • Seasonal allergies   • Osteopenia   • Essential hypertension   • Edema   • Diverticulitis of colon   • Allergic rhinitis   • Postmenopausal   • Ingrown toenail   • Paresthesia of both feet   • Type 2 diabetes mellitus without complication, without long-term current use of insulin (CMS/HCC)   • Cobalamin deficiency   • Diabetic peripheral neuropathy (CMS/HCC)   • Encounter for screening for malignant neoplasm of colon   • Villous adenoma of colon 2019   • Adenomatous polyp       Therapy Treatment    Rehabilitation Treatment Summary     Row Name 20 0933 20 0904          Treatment Time/Intention    Discipline  physical therapy assistant  -TW  occupational therapy assistant  -ALBERTO     Document Type  therapy note (daily note)  -TW  therapy note (daily note)  -ALBERTO     Subjective Information  no complaints  -TW  no complaints  -ALBERTO     Mode of Treatment  physical therapy;co-treatment;occupational therapy  -TW  occupational therapy;co-treatment  -ALBERTO     Patient/Family Observations  No family present.  -TW  no family present  -ALBERTO     Care Plan Review  --  care plan/treatment goals reviewed;risks/benefits reviewed;current/potential barriers reviewed;patient/other agree to care plan  -ALBERTO     Therapy Frequency (OT Eval)  --  daily  -ALBERTO     Patient Effort  good  -TW  good  -ALBERTO     Existing Precautions/Restrictions  fall;oxygen  therapy device and L/min  -TW  fall;oxygen therapy device and L/min  -ALBERTO     Treatment Considerations/Comments  --  Pt. BP high with nsg notified and permitted therapy  -ALBERTO     Recorded by [TW] Paolo Gonzales, PTA 01/11/20 1306 [ALBERTO] La Nena Ramirez OTA 01/11/20 1221     Row Name 01/11/20 0933 01/11/20 0904          Vital Signs    Pre Systolic BP Rehab  --  207  -ALBERTO     Pre Treatment Diastolic BP  --  93  -ALBERTO     Intra Systolic BP Rehab  --  211  -ALBERTO     Intra Treatment Diastolic BP  --  93  -ALBERTO     Post Systolic BP Rehab  --  190  -ALBERTO     Post Treatment Diastolic BP  --  60  -ALBERTO     Pretreatment Heart Rate (beats/min)  --  91  -ALBERTO     Posttreatment Heart Rate (beats/min)  --  90  -ALBERTO     Pre SpO2 (%)  --  91  -ALBERTO     O2 Delivery Pre Treatment  --  supplemental O2  -ALBERTO     Post SpO2 (%)  --  94  -ALBERTO     O2 Delivery Post Treatment  --  supplemental O2  -ALBERTO     Pre Patient Position  Sitting  -TW  Supine  -ALBERTO     Intra Patient Position  Standing  -TW  Standing  -ALBERTO     Post Patient Position  Supine  -TW  Supine  -ALBERTO     Recorded by [TW] Paolo Gonzales, PTA 01/11/20 1306 [ALBERTO] La Nena Ramirez OTA 01/11/20 1221     Row Name 01/11/20 0933 01/11/20 0904          Cognitive Assessment/Intervention- PT/OT    Affect/Mental Status (Cognitive)  WFL  -TW  WFL  -ALBERTO     Orientation Status (Cognition)  oriented x 4  -TW  oriented x 4  -ALBERTO     Follows Commands (Cognition)  WFL  -TW  WFL  -ALBERTO     Cognitive Function (Cognitive)  WFL  -TW  WFL  -ALBERTO     Personal Safety Interventions  fall prevention program maintained;gait belt;nonskid shoes/slippers when out of bed  -TW  --     Recorded by [TW] Paolo Gonzales, PTA 01/11/20 1306 [ALBERTO] aL Nena Ramirez OTA 01/11/20 1221     Row Name 01/11/20 0904             Safety Issues, Functional Mobility    Impairments Affecting Function (Mobility)  coordination;endurance/activity tolerance;balance;strength;postural/trunk control  -ALBERTO      Recorded by [ALBERTO] La Nena Ramirez OTA 01/11/20 1221       Row Name 01/11/20 0933 01/11/20 0904          Bed Mobility Assessment/Treatment    Bed Mobility Assessment/Treatment  --  supine-sit-supine  -ALBERTO     Supine-Sit-Supine [place] (Bed Mobility)  --  conditional independence  -ALBERTO     Bed Mobility, Safety Issues  --  decreased use of legs for bridging/pushing;decreased use of arms for pushing/pulling  -ALBERTO     Assistive Device (Bed Mobility)  --  bed rails;head of bed elevated  -ALBERTO     Comment (Bed Mobility)  Pt was up EOB with TORRES upon my arrival.  -TW  --     Recorded by [TW] Paolo Gonzales, PTA 01/11/20 1306 [ALBERTO] La Nena Ramirez, [facility] 01/11/20 1221     Row Name 01/11/20 0904             Functional Mobility    Functional Mobility- Ind. Level  contact guard assist  -ALBERTO      Functional Mobility- Device  standard walker  -ALBERTO      Functional Mobility- Safety Issues  supplemental O2  -ALBERTO      Recorded by [ALBERTO] La Nena Ramirez [facility] 01/11/20 1221      Row Name 01/11/20 0904             Transfer Assessment/Treatment    Transfer Assessment/Treatment  toilet transfer  -ALBERTO      Recorded by [ALBERTO] La Nena Ramirez [facility] 01/11/20 1221      Row Name 01/11/20 0933             Sit-Stand Transfer    Sit-Stand [place] (Transfers)  stand by assist;minimum assist (75% patient effort)  -TW      Assistive Device (Sit-Stand Transfers)  walker, front-wheeled  -TW      Recorded by [TW] Paolo Gonzales, PTA 01/11/20 1306      Row Name 01/11/20 0933             Stand-Sit Transfer    Stand-Sit [place] (Transfers)  stand by assist;minimum assist (75% patient effort)  -TW      Assistive Device (Stand-Sit Transfers)  walker, front-wheeled  -TW      Recorded by [TW] Paolo Gonzales, PTA 01/11/20 1306      Row Name 01/11/20 0904             Toilet Transfer    Type (Toilet Transfer)  stand-sit;sit-stand  -ALBERTO      [place] Level (Toilet Transfer)  set up;supervision  -ALBERTO      Assistive Device (Toilet Transfer)  grab bars/safety frame;walker, standard  -ALBERTO      Recorded by [ALBERTO]  La Nena Ramirez [facility] 01/11/20 1221      Row Name 01/11/20 0933             Gait/Stairs Assessment/Training    Gait/Stairs Assessment/Training  gait/ambulation assistive device  -TW      [place] Level (Gait)  contact guard;minimum assist (75% patient effort)  -TW      Assistive Device (Gait)  walker, front-wheeled  -TW      Distance in Feet (Gait)  312ft  -TW      Deviations/Abnormal Patterns (Gait)  armando decreased;stride length decreased  -TW      Recorded by [TW] Paolo Gonzales PTA 01/11/20 1306      Row Name 01/11/20 0904             ADL Assessment/Intervention    BADL Assessment/Intervention  toileting  -ALBERTO      Recorded by [ALBERTO] La Nena Ramirez OTA 01/11/20 1221      Row Name 01/11/20 0904             Toileting Assessment/Training    [place] Level (Toileting)  toileting skills;adjust/manage clothing;perform perineal hygiene;set up;supervision;verbal cues  -ALBERTO      Assistive Devices (Toileting)  grab bar/safety frame  -ALBERTO      Toileting Position  unsupported sitting  -ALBERTO      Recorded by [ALBERTO] La Nena Ramirez OTA 01/11/20 1221      Row Name 01/11/20 0904             BADL Safety/Performance    Impairments, BADL Safety/Performance  shortness of breath increased BP  -ALBERTO      Recorded by [ALBERTO] La Nena Ramirez [facility] 01/11/20 1221      Row Name 01/11/20 0933 01/11/20 0904          Positioning and Restraints    Pre-Treatment Position  in bed  -TW  in bed  -ALBERTO     Post Treatment Position  bed  -TW  bed  -ALBERTO     In Bed  supine;call light within reach;encouraged to call for assist;exit alarm on  -TW  call light within reach;encouraged to call for assist;exit alarm on;side rails up x2  -ALBERTO     Recorded by [TW] Paolo Gonzales PTA 01/11/20 1306 [ALBERTO] La Nena Ramirez OTA 01/11/20 1221     Row Name 01/11/20 0933 01/11/20 0904          Pain Scale: Numbers Pre/Post-Treatment    Pain Scale: Numbers, Pretreatment  0/10 - no pain  -TW  0/10 - no pain  -ALBERTO     Pain Scale: Numbers, Post-Treatment  0/10 - no pain  -TW   0/10 - no pain  -ALBERTO     Recorded by [TW] Paolo Gonzales PTA 01/11/20 1306 [ALBERTO] La Nena Ramirez OTA 01/11/20 1221     Row Name                Wound 01/09/20 0816 abdomen Incision    Wound - Properties Group Date first assessed: 01/09/20 [TR] Time first assessed: 0816 [TR] Present on Hospital Admission: N [TR] Location: abdomen [TR] Primary Wound Type: Incision [TR] Recorded by:  [TR] Ariane Menendez, RN 01/09/20 0832    Row Name 01/11/20 0904             Coping    Observed Emotional State  accepting;calm;cooperative;pleasant  -ALBERTO      Recorded by [ALBERTO] La Nena Ramirez OTA 01/11/20 1221      Row Name 01/11/20 0904             Plan of Care Review    Plan of Care Reviewed With  patient  -ALBERTO      Progress  improving  -ALBERTO      Recorded by [ALBERTO] La Nena Ramirez OTA 01/11/20 1221      Row Name 01/11/20 0904             Outcome Summary/Treatment Plan (OT)    Daily Summary of Progress (OT)  progress toward functional goals as expected  -ALBERTO      Anticipated Discharge Disposition (OT)  anticipate therapy at next level of care  -ALBERTO      Recorded by [ALBERTO] La Nena Ramirez OTA 01/11/20 1221      Row Name 01/11/20 0933             Outcome Summary/Treatment Plan (PT)    Daily Summary of Progress (PT)  progress toward functional goals as expected  -TW      Plan for Continued Treatment (PT)  Cont  -TW      Anticipated Discharge Disposition (PT)  anticipate therapy at next level of care  -TW      Recorded by [TW] Paolo Gonzales PTA 01/11/20 1306        User Key  (r) = Recorded By, (t) = Taken By, (c) = Cosigned By    Initials Name Effective Dates Discipline    TW Paolo Gonzales, PTA 03/07/18 -  PT    TR Ariane Menendez RN 10/17/16 -  Nurse    ALBERTO La Nena Ramirez OTA 11/05/19 -  OT          Wound 01/09/20 0816 abdomen Incision (Active)   Dressing Appearance dry;intact 1/11/2020  8:46 AM   Closure GIGI 1/11/2020  8:46 AM   Base dressing in place, unable to visualize 1/11/2020  8:46 AM   Drainage Amount none 1/11/2020  8:46 AM        Rehab Goal Summary     Row Name 01/11/20 0933 01/11/20 0904          Physical Therapy Goals    Problem Specific Goal Selection (PT)  problem specific goal 2, PT;problem specific goal 1, PT  -TW  --     Additional Documentation  Problem Specific Goal Selection (PT) (Row)  -TW  --        Bed Mobility Goal 1 (PT)    Activity/Assistive Device (Bed Mobility Goal 1, PT)  sit to supine;supine to sit  -TW  --     Gem Level/Cues Needed (Bed Mobility Goal 1, PT)  independent  -TW  --     Time Frame (Bed Mobility Goal 1, PT)  by discharge  -TW  --     Progress/Outcomes (Bed Mobility Goal 1, PT)  goal not met  -TW  --        Transfer Goal 1 (PT)    Activity/Assistive Device (Transfer Goal 1, PT)  bed-to-chair/chair-to-bed  -TW  --     [place]em Level/Cues Needed (Transfer Goal 1, PT)  independent;conditional independence  -TW  --     Time Frame (Transfer Goal 1, PT)  by discharge  -TW  --     Progress/Outcome (Transfer Goal 1, PT)  goal not met  -TW  --        Gait Training Goal 1 (PT)    Activity/Assistive Device (Gait Training Goal 1, PT)  gait (walking locomotion);assistive device use;backward stepping  -TW  --     [place]em Level (Gait Training Goal 1, PT)  independent  -TW  --     Distance (Gait Goal 1, PT)  600 ftx1  -TW  --     Time Frame (Gait Training Goal 1, PT)  by discharge  -TW  --     Progress/Outcome (Gait Training Goal 1, PT)  goal not met  -TW  --        Stairs Goal 1 (PT)    Activity/Assistive Device (Stairs Goal 1, PT)  stairs, all skills;ascending stairs;descending stairs  -TW  --     Gem Level/Cues Needed (Stairs Goal 1, PT)  independent  -TW  --     Number of Stairs (Stairs Goal 1, PT)  3   -TW  --     Time Frame (Stairs Goal 1, PT)  by discharge  -TW  --     Progress/Outcome (Stairs Goal 1, PT)  goal not met  -TW  --        Problem Specific Goal 1 (PT)    Problem Specific Goal 1 (PT)  Patient will completed tinetti balance and gait with medically appropriate.  -TW  --      Time Frame (Problem Specific Goal 1, PT)  2 days  -TW  --     Progress/Outcome (Problem Specific Goal 1, PT)  goal not met  -TW  --        Problem Specific Goal 2 (PT)    Problem Specific Goal 2 (PT)  Patient will score >24/28 on tinetti to indicate low fall risk.  -TW  --     Time Frame (Problem Specific Goal 2, PT)  by discharge  -TW  --     Progress/Outcome (Problem Specific Goal 2, PT)  goal not met  -TW  --        Patient Education Goal (PT)    Activity (Patient Education Goal, PT)  Patient will understand the how to implent intentional walking program to improve CV endurance and strength  -TW  --     [place]/Cues/Accuracy (Memory Goal 2, PT)  demonstrates adequately;verbalizes understanding  -TW  --     Time Frame (Patient Education Goal, PT)  by discharge  -TW  --     Progress/Outcome (Patient Education Goal, PT)  goal not met  -TW  --        Occupational Therapy Goals    Transfer Goal Selection (OT)  --  transfer, OT goal 1  -ALBERTO     Bathing Goal Selection (OT)  --  bathing, OT goal 1  -ALBERTO     Dressing Goal Selection (OT)  --  dressing, OT goal 1  -ALBERTO     Toileting Goal Selection (OT)  --  toileting, OT goal 1  -ALBERTO     Grooming Goal Selection (OT)  --  grooming, OT goal 1  -ALBERTO     Activity Tolerance Goal Selection (OT)  --  activity tolerance, OT goal 1  -ALBEROT        Transfer Goal 1 (OT)    Activity/Assistive Device (Transfer Goal 1, OT)  --  transfers, all  -ALBERTO     [place] Level/Cues Needed (Transfer Goal 1, OT)  --  verbal cues required;tactile cues required;set-up required;supervision required  -ALBERTO     Time Frame (Transfer Goal 1, OT)  --  long term goal (LTG);by discharge  -ALBERTO     Progress/Outcome (Transfer Goal 1, OT)  --  goal met  -ALBERTO        Bathing Goal 1 (OT)    Activity/Assistive Device (Bathing Goal 1, OT)  --  bathing skills, all  -ALBERTO     [place] Level/Cues Needed (Bathing Goal 1, OT)  --  standby assist;verbal cues required;tactile cues required;set-up required  -ALBERTO     Time Frame  (Bathing Goal 1, OT)  --  long term goal (LTG);by discharge  -ALBERTO     Progress/Outcomes (Bathing Goal 1, OT)  --  goal not met  -ALBERTO        Dressing Goal 1 (OT)    Activity/Assistive Device (Dressing Goal 1, OT)  --  dressing skills, all  -ALBERTO     [place]/Cues Needed (Dressing Goal 1, OT)  --  standby assist;verbal cues required;tactile cues required;set-up required  -ALBERTO     Time Frame (Dressing Goal 1, OT)  --  long term goal (LTG);by discharge  -ALBERTO     Progress/Outcome (Dressing Goal 1, OT)  --  goal not met  -ALBERTO        Toileting Goal 1 (OT)    Activity/Device (Toileting Goal 1, OT)  --  toileting skills, all  -ALBERTO     [place] Level/Cues Needed (Toileting Goal 1, OT)  --  supervision required;verbal cues required;tactile cues required;set-up required  -ALBERTO     Time Frame (Toileting Goal 1, OT)  --  long term goal (LTG);by discharge  -ALBERTO     Progress/Outcome (Toileting Goal 1, OT)  --  goal met  -ALBERTO        Grooming Goal 1 (OT)    Activity/Device (Grooming Goal 1, OT)  --  grooming skills, all  -ALBERTO     [place] (Grooming Goal 1, OT)  --  supervision required;verbal cues required;tactile cues required;set-up required  -ALBERTO     Time Frame (Grooming Goal 1, OT)  --  long term goal (LTG);by discharge  -ALBERTO     Progress/Outcome (Grooming Goal 1, OT)  --  goal not met  -ALBERTO         Activity Tolerance Goal 1 (OT)    Activity Level (Endurance Goal 1, OT)  --  -- 20 min functional activity with 1-2 rest breaks   -ALBERTO     Progress/Outcome (Activity Tolerance Goal 1, OT)  --  goal not met  -ALBERTO       User Key  (r) = Recorded By, (t) = Taken By, (c) = Cosigned By    Initials Name Provider Type Discipline    TW Paolo Gonzales, PTA Physical Therapy Assistant PT    La Nena Hoang OTA Occupational Therapy Assistant OT              PT Recommendation and Plan  Anticipated Discharge Disposition (PT): anticipate therapy at next level of care  Outcome Summary/Treatment Plan (PT)  Daily Summary of Progress (PT): progress  toward functional goals as expected  Plan for Continued Treatment (PT): Cont  Anticipated Discharge Disposition (PT): anticipate therapy at next level of care  Plan of Care Reviewed With: patient  Progress: improving  Outcome Summary: Pt able to stand with CGA and amb with CGA/Min of 1 and another for equipment for 312ft with good effort. Pt's BP elevated but nsg aware and okayed pt to amb with pt nonsymptomatic. Pt would cont to benefit from therapy upon DC.  Outcome Measures     Row Name 01/11/20 1200 01/11/20 0933 01/10/20 1507       How much help from another person do you currently need...    Turning from your back to your side while in flat bed without using bedrails?  --  3  -TW  3  -PAOLA    Moving from lying on back to sitting on the side of a flat bed without bedrails?  --  3  -TW  3  -PAOLA    Moving to and from a bed to a chair (including a wheelchair)?  --  3  -TW  3  -PAOLA    Standing up from a chair using your arms (e.g., wheelchair, bedside chair)?  --  3  -TW  3  -PAOLA    Climbing 3-5 steps with a railing?  --  2  -TW  2  -PAOLA    To walk in hospital room?  --  3  -TW  3  -PAOLA    AM-PAC 6 Clicks Score (PT)  --  17  -TW  17  -PAOLA       How much help from another is currently needed...    Putting on and taking off regular lower body clothing?  2  -ALBERTO  --  --    Bathing (including washing, rinsing, and drying)  2  -ALBERTO  --  --    Toileting (which includes using toilet bed pan or urinal)  3  -ALBERTO  --  --    Putting on and taking off regular upper body clothing  3  -ALBERTO  --  --    Taking care of personal grooming (such as brushing teeth)  3  -ALBERTO  --  --    Eating meals  4  -ALBERTO  --  --    AM-PAC 6 Clicks Score (OT)  17  -ALBERTO  --  --       Functional Assessment    Outcome Measure Options  AM-PAC 6 Clicks Daily Activity (OT)  -ALBERTO  AM-PAC 6 Clicks Basic Mobility (PT)  -TW  AM-PAC 6 Clicks Basic Mobility (PT)  -PAOLA    Row Name 01/10/20 0937             How much help from another is currently needed...    Putting on and taking  off regular lower body clothing?  2  -JH      Bathing (including washing, rinsing, and drying)  2  -JH      Toileting (which includes using toilet bed pan or urinal)  2  -      Putting on and taking off regular upper body clothing  3  -      Taking care of personal grooming (such as brushing teeth)  3  -      Eating meals  4  -      AM-PAC 6 Clicks Score (OT)  16  -         Functional Assessment    Outcome Measure Options  AM-PAC 6 Clicks Daily Activity (OT)  -        User Key  (r) = Recorded By, (t) = Taken By, (c) = Cosigned By    Initials Name Provider Type    Timothy Rebolledo PTA Physical Therapy Assistant    TW Paolo Gonzales PTA Physical Therapy Assistant    Brent Al OT Occupational Therapist    La Nena Hoang OTA Occupational Therapy Assistant         Time Calculation:   PT Charges     Row Name 01/11/20 1309             Time Calculation    Start Time  0933  -TW      Stop Time  1000  -TW      Time Calculation (min)  27 min  -TW      PT Non-Billable Time (min)  12 min  -TW      PT Received On  01/11/20  -TW      PT Goal Re-Cert Due Date  01/23/20  -TW         Time Calculation- PT    Total Timed Code Minutes- PT  15 minute(s)  -TW        User Key  (r) = Recorded By, (t) = Taken By, (c) = Cosigned By    Initials Name Provider Type     Paolo Gonzales PTA Physical Therapy Assistant        Therapy Charges for Today     Code Description Service Date Service Provider Modifiers Qty    03935782038 HC GAIT TRAINING EA 15 MIN 1/11/2020 Paolo Gonzales PTA GP 1    85240045329 HC PT THER SUPP EA 15 MIN 1/11/2020 Paolo Gonzales PTA GP 1          PT G-Codes  Outcome Measure Options: AM-PAC 6 Clicks Daily Activity (OT)  AM-PAC 6 Clicks Score (PT): 17  AM-PAC 6 Clicks Score (OT): 17    Paolo Gonzales PTA  1/11/2020

## 2020-01-11 NOTE — THERAPY TREATMENT NOTE
Acute Care - Occupational Therapy Treatment Note  [facility]     Patient Name: Sharon Esposito  : 1945  MRN: [id number]  Today's Date: 2020  Onset of Illness/Injury or Date of Surgery: 20  Date of Referral to OT: 01/10/20  Referring Physician: ELLIE Mcneill MD    Admit Date: 2020       ICD-10-CM ICD-9-CM   1. Adenomatous polyp D36.9 229.9   2. Decreased functional mobility R26.89 781.99   3. Impaired mobility and activities of daily living Z74.09 799.89     Patient Active Problem List   Diagnosis   • Need for hepatitis C screening test   • Hyperlipidemia   • Acquired hypothyroidism   • Seasonal allergies   • Osteopenia   • Essential hypertension   • Edema   • Diverticulitis of colon   • Allergic rhinitis   • Postmenopausal   • Ingrown toenail   • Paresthesia of both feet   • Type 2 diabetes mellitus without complication, without long-term current use of insulin (CMS/HCC)   • Cobalamin deficiency   • Diabetic peripheral neuropathy (CMS/HCC)   • Encounter for screening for malignant neoplasm of colon   • Villous adenoma of colon 2019   • Adenomatous polyp     Past Medical History:   Diagnosis Date   • Acquired hypothyroidism     with hashimoto's thyroiditis      • Acute bronchitis    • Acute sinusitis    • Allergic rhinitis    • Bilateral hand pain    • Cough    • Diabetes mellitus (CMS/HCC)    • Diverticulitis of colon    • Edema     idiopathic state, history of      • Encounter for gynecological examination (general) (routine) without abnormal findings    • Encounter for screening for osteoporosis    • Essential hypertension    • Hemorrhoids     internal & external      • Hx of bone density study     DEXA BONE DENSITY APPENDICULAR: osteopenia, 2013   • Hx of screening mammography     MAMMOGRAM SCREENING: x2, 2014   • Hyperlipidemia     with elevated low density lipoprotein   • 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     history   •  Infection of skin and subcutaneous tissue     Infection of skin AND/OR subcutaneous tissue      • Onychogryposis     recurrent ingrown toenail      • Open wound of face    • Osteopenia    • Screening for malignant neoplasm of breast    • Screening for osteoporosis    • Seasonal allergies     Seasonal allergy      • Vitamin deficiency    • Wears glasses      Past Surgical History:   Procedure Laterality Date   • CATARACT EXTRACTION, BILATERAL     • COLON RESECTION N/A 1/9/2020    Procedure: COLON RESECTION LAPAROSCOPIC CONVERTED TO OPEN;  Surgeon: Ozzy Young MD;  Location: [facility] OR;  Service: General   • COLONOSCOPY      Approximately 04/2009   • COLONOSCOPY N/A 8/14/2019    Procedure: COLONOSCOPY Monday or Wed;  Surgeon: Tony Curiel MD;  Location: [facility] ENDOSCOPY;  Service: Gastroenterology   • COLONOSCOPY N/A 11/13/2019    Procedure: COLONOSCOPY A Wed in Nov;  Surgeon: Tony Curiel MD;  Location: [facility] ENDOSCOPY;  Service: Gastroenterology   • COLONOSCOPY N/A 1/9/2020    Procedure: COLONOSCOPY    DONE IN OR WITH ENDO             (colonoscpy first);  Surgeon: Ozzy Young MD;  Location: [facility] OR;  Service: General   • ENDOSCOPY AND COLONOSCOPY  04/20/2009    A few small diverticula in the sigmoid & the descending colon. Small internal & external hemorrhoids were present. Colonoscopy otherwise normal   • INJECTION OF MEDICATION  06/23/2015    Celestone (betamethasone) x2   • INJECTION OF MEDICATION  12/08/2014    Toradol    • PAP SMEAR  06/23/2009    Negative for intraepithelial lesion or malignancy   • SKIN BIOPSY  09/30/2010    L neck lesion- seborrheic keratosis. right infraorbital-seborrheic keratosis, right lateral orbital- intradermal nevus. forehead lesion-early squamous papilloma   Non-skid socks and gait belt in place. Toileting offered. Call light and needs within reach. Pt advised to not get up alone and call the nurse for assistance.  Bed alarm on.     Therapy  Treatment    Rehabilitation Treatment Summary     Row Name 01/11/20 0904             Treatment Time/Intention    Discipline  occupational therapy assistant  -ALBERTO      Document Type  therapy note (daily note)  -ALBERTO      Subjective Information  no complaints  -ALBERTO      Mode of Treatment  occupational therapy;co-treatment  -ALBERTO      Patient/Family Observations  no family present  -ALBERTO      Care Plan Review  care plan/treatment goals reviewed;risks/benefits reviewed;current/potential barriers reviewed;patient/other agree to care plan  -ALBERTO      Therapy Frequency (OT Eval)  daily  -ALBERTO      Patient Effort  good  -ALBERTO      Existing Precautions/Restrictions  fall;oxygen therapy device and L/min  -ALBERTO      Treatment Considerations/Comments  Pt. BP high with nsg notified and permitted therapy  -ALBERTO      Recorded by [ALBERTO] La Nena Ramirez OTA 01/11/20 1221      Row Name 01/11/20 0904             Vital Signs    Pre Systolic BP Rehab  207  -ALBERTO      Pre Treatment Diastolic BP  93  -ALBERTO      Intra Systolic BP Rehab  211  -ALBERTO      Intra Treatment Diastolic BP  93  -ALBERTO      Post Systolic BP Rehab  190  -ALBERTO      Post Treatment Diastolic BP  60  -ALBERTO      Pretreatment Heart Rate (beats/min)  91  -ALBERTO      Posttreatment Heart Rate (beats/min)  90  -ALBERTO      Pre SpO2 (%)  91  -ALBERTO      O2 Delivery Pre Treatment  supplemental O2  -ALBERTO      Post SpO2 (%)  94  -ALBERTO      O2 Delivery Post Treatment  supplemental O2  -ALBERTO      Pre Patient Position  Supine  -ALBERTO      Intra Patient Position  Standing  -ALBERTO      Post Patient Position  Supine  -ALBERTO      Recorded by [ALBERTO] La Nena Ramirez OTA 01/11/20 1221      Row Name 01/11/20 0904             Cognitive Assessment/Intervention- PT/OT    Affect/Mental Status (Cognitive)  WFL  -ALBERTO      Orientation Status (Cognition)  oriented x 4  -ALBERTO      Follows Commands (Cognition)  WFL  -ALBERTO      Cognitive Function (Cognitive)  WFL  -ALBERTO      Recorded by [ALBERTO] La Nena Ramirez OTA 01/11/20 1221      Row Name 01/11/20 0904              Safety Issues, Functional Mobility    Impairments Affecting Function (Mobility)  coordination;endurance/activity tolerance;balance;strength;postural/trunk control  -ALBERTO      Recorded by [ALBERTO] La Nena Ramirez OTA 01/11/20 1221      Row Name 01/11/20 0904             Bed Mobility Assessment/Treatment    Bed Mobility Assessment/Treatment  supine-sit-supine  -ALBERTO      Supine-Sit-Supine [place] (Bed Mobility)  conditional independence  -ALBERTO      Bed Mobility, Safety Issues  decreased use of legs for bridging/pushing;decreased use of arms for pushing/pulling  -ALBERTO      Assistive Device (Bed Mobility)  bed rails;head of bed elevated  -ALBERTO      Recorded by [ALBERTO] La Nena Ramirez OTA 01/11/20 1221      Row Name 01/11/20 0904             Functional Mobility    Functional Mobility- Ind. Level  contact guard assist  -ALBERTO      Functional Mobility- Device  standard walker  -ALBERTO      Functional Mobility- Safety Issues  supplemental O2  -ALBERTO      Recorded by [ALBERTO] La Nena Ramirez OTA 01/11/20 1221      Row Name 01/11/20 0904             Transfer Assessment/Treatment    Transfer Assessment/Treatment  toilet transfer  -ALBERTO      Recorded by [ALBERTO] La Nena Ramirez OTA 01/11/20 1221      Row Name 01/11/20 0904             Toilet Transfer    Type (Toilet Transfer)  stand-sit;sit-stand  -ALBERTO      [place] Level (Toilet Transfer)  set up;supervision  -ALBERTO      Assistive Device (Toilet Transfer)  grab bars/safety frame;walker, standard  -ALBERTO      Recorded by [ALBERTO] La Nena Ramirez OTA 01/11/20 1221      Row Name 01/11/20 0904             ADL Assessment/Intervention    BADL Assessment/Intervention  toileting  -ALBERTO      Recorded by [ALBERTO] La Nena Ramirez OTA 01/11/20 1221      Row Name 01/11/20 0904             Toileting Assessment/Training    [place] Level (Toileting)  toileting skills;adjust/manage clothing;perform perineal hygiene;set up;supervision;verbal cues  -ALBERTO      Assistive Devices (Toileting)  grab bar/safety frame  -ALBERTO      Toileting  Position  unsupported sitting  -ALBERTO      Recorded by [ALBERTO] La Nena Ramirez OTA 01/11/20 1221      Row Name 01/11/20 0904             BADL Safety/Performance    Impairments, BADL Safety/Performance  shortness of breath increased BP  -ALBERTO      Recorded by [ALBERTO] La Nena Ramirez OTA 01/11/20 1221      Row Name 01/11/20 0904             Positioning and Restraints    Pre-Treatment Position  in bed  -ALBERTO      Post Treatment Position  bed  -ALBERTO      In Bed  call light within reach;encouraged to call for assist;exit alarm on;side rails up x2  -ALBERTO      Recorded by [ALBERTO] La Nena Ramirez OTA 01/11/20 1221      Row Name 01/11/20 0904             Pain Scale: Numbers Pre/Post-Treatment    Pain Scale: Numbers, Pretreatment  0/10 - no pain  -ALBERTO      Pain Scale: Numbers, Post-Treatment  0/10 - no pain  -ALBERTO      Recorded by [ALBERTO] La Nena Ramirez OTA 01/11/20 1221      Row Name                Wound 01/09/20 0816 abdomen Incision    Wound - Properties Group Date first assessed: 01/09/20 [TR] Time first assessed: 0816 [TR] Present on Hospital Admission: N [TR] Location: abdomen [TR] Primary Wound Type: Incision [TR] Recorded by:  [TR] Ariane Menendez RN 01/09/20 0832    Row Name 01/11/20 0904             Coping    Observed Emotional State  accepting;calm;cooperative;pleasant  -ALBERTO      Recorded by [ALBERTO] La Nena Ramirez OTA 01/11/20 1221      Row Name 01/11/20 0904             Plan of Care Review    Plan of Care Reviewed With  patient  -ALBERTO      Progress  improving  -ALBERTO      Recorded by [ALBERTO] La Nena Ramirez OTA 01/11/20 1221      Row Name 01/11/20 0904             Outcome Summary/Treatment Plan (OT)    Daily Summary of Progress (OT)  progress toward functional goals as expected  -ALBERTO      Anticipated Discharge Disposition (OT)  anticipate therapy at next level of care  -ALBERTO      Recorded by [ALBERTO] La Nena Ramirez OTA 01/11/20 1221        User Key  (r) = Recorded By, (t) = Taken By, (c) = Cosigned By    Initials Name Effective Dates Discipline    TR  Ariane Menendez, RN 10/17/16 -  Nurse    La Nena Hoang, MISHA 11/05/19 -  OT        Wound 01/09/20 0816 abdomen Incision (Active)   Dressing Appearance dry;intact 1/11/2020  8:46 AM   Closure GIGI 1/11/2020  8:46 AM   Base dressing in place, unable to visualize 1/11/2020  8:46 AM   Drainage Amount none 1/11/2020  8:46 AM     Rehab Goal Summary     Row Name 01/11/20 0904             Occupational Therapy Goals    Transfer Goal Selection (OT)  transfer, OT goal 1  -ALBERTO      Bathing Goal Selection (OT)  bathing, OT goal 1  -ALBERTO      Dressing Goal Selection (OT)  dressing, OT goal 1  -ALBERTO      Toileting Goal Selection (OT)  toileting, OT goal 1  -ALBERTO      Grooming Goal Selection (OT)  grooming, OT goal 1  -ALBERTO      Activity Tolerance Goal Selection (OT)  activity tolerance, OT goal 1  -ALBERTO         Transfer Goal 1 (OT)    Activity/Assistive Device (Transfer Goal 1, OT)  transfers, all  -ALBERTO      [place] Level/Cues Needed (Transfer Goal 1, OT)  verbal cues required;tactile cues required;set-up required;supervision required  -ALBERTO      Time Frame (Transfer Goal 1, OT)  long term goal (LTG);by discharge  -ALBERTO      Progress/Outcome (Transfer Goal 1, OT)  goal met  -ALBERTO         Bathing Goal 1 (OT)    Activity/Assistive Device (Bathing Goal 1, OT)  bathing skills, all  -ALBERTO      [place] Level/Cues Needed (Bathing Goal 1, OT)  standby assist;verbal cues required;tactile cues required;set-up required  -ALBERTO      Time Frame (Bathing Goal 1, OT)  long term goal (LTG);by discharge  -ALBERTO      Progress/Outcomes (Bathing Goal 1, OT)  goal not met  -ALBERTO         Dressing Goal 1 (OT)    Activity/Assistive Device (Dressing Goal 1, OT)  dressing skills, all  -ALBERTO      [place]/Cues Needed (Dressing Goal 1, OT)  standby assist;verbal cues required;tactile cues required;set-up required  -ALBERTO      Time Frame (Dressing Goal 1, OT)  long term goal (LTG);by discharge  -ALBERTO      Progress/Outcome (Dressing Goal 1, OT)  goal not met  -ALBERTO          Toileting Goal 1 (OT)    Activity/Device (Toileting Goal 1, OT)  toileting skills, all  -ALBERTO      [place] Level/Cues Needed (Toileting Goal 1, OT)  supervision required;verbal cues required;tactile cues required;set-up required  -ALBERTO      Time Frame (Toileting Goal 1, OT)  long term goal (LTG);by discharge  -ALBETRO      Progress/Outcome (Toileting Goal 1, OT)  goal met  -ALBERTO         Grooming Goal 1 (OT)    Activity/Device (Grooming Goal 1, OT)  grooming skills, all  -ALBERTO      [place] (Grooming Goal 1, OT)  supervision required;verbal cues required;tactile cues required;set-up required  -ALBERTO      Time Frame (Grooming Goal 1, OT)  long term goal (LTG);by discharge  -ALBERTO      Progress/Outcome (Grooming Goal 1, OT)  goal not met  -ALBERTO          Activity Tolerance Goal 1 (OT)    Activity Level (Endurance Goal 1, OT)  -- 20 min functional activity with 1-2 rest breaks   -ALBERTO      Progress/Outcome (Activity Tolerance Goal 1, OT)  goal not met  -ALBERTO        User Key  (r) = Recorded By, (t) = Taken By, (c) = Cosigned By    Initials Name Provider Type Discipline    La Nena Hoang OTA Occupational Therapy Assistant OT            OT Recommendation and Plan  Outcome Summary/Treatment Plan (OT)  Daily Summary of Progress (OT): progress toward functional goals as expected  Anticipated Discharge Disposition (OT): anticipate therapy at next level of care  Therapy Frequency (OT Eval): daily  Daily Summary of Progress (OT): progress toward functional goals as expected  Plan of Care Review  Plan of Care Reviewed With: patient  Plan of Care Reviewed With: patient  Outcome Summary: Pt pleasant and agreeable to tx. Pt longsitting with HOB elevated. Pt initial /93 with nsg notified. Nsg intered room and took BP which was 203/93. Nsg suggested ambulation could bring BP down. Pt agreed to fx'al mobility and toileting. Pt conditional I for bed mobility to include sup<>sit<>sup and bridging to HOB with use of bedrails. Pt CGA for toilet  t/f and set up with supervision for all toileting to include toilet management and magdi care. Pt performed fx'al mobility with CGA. Pt BP down to 188/79 when returning to bed in the supine position.  Outcome Measures     Row Name 01/11/20 1200 01/10/20 1507 01/10/20 0937       How much help from another person do you currently need...    Turning from your back to your side while in flat bed without using bedrails?  --  3  -PAOLA  --    Moving from lying on back to sitting on the side of a flat bed without bedrails?  --  3  -PAOLA  --    Moving to and from a bed to a chair (including a wheelchair)?  --  3  -PAOLA  --    Standing up from a chair using your arms (e.g., wheelchair, bedside chair)?  --  3  -PAOLA  --    Climbing 3-5 steps with a railing?  --  2  -PAOLA  --    To walk in hospital room?  --  3  -PAOLA  --    AM-PAC 6 Clicks Score (PT)  --  17  -PAOLA  --       How much help from another is currently needed...    Putting on and taking off regular lower body clothing?  2  -ALBERTO  --  2  -JH    Bathing (including washing, rinsing, and drying)  2  -ALBERTO  --  2  -JH    Toileting (which includes using toilet bed pan or urinal)  3  -ALBERTO  --  2  -JH    Putting on and taking off regular upper body clothing  3  -ALBERTO  --  3  -JH    Taking care of personal grooming (such as brushing teeth)  3  -ALBERTO  --  3  -JH    Eating meals  4  -ALBERTO  --  4  -JH    AM-PAC 6 Clicks Score (OT)  17  -ALBERTO  --  16  -       Functional Assessment    Outcome Measure Options  AM-PAC 6 Clicks Daily Activity (OT)  -  AM-PAC 6 Clicks Basic Mobility (PT)  -  AM-PAC 6 Clicks Daily Activity (OT)  -      User Key  (r) = Recorded By, (t) = Taken By, (c) = Cosigned By    Initials Name Provider Type    Timothy Rebolledo PTA Physical Therapy Assistant     Brent Marte OT Occupational Therapist    La Nena Hoang OTA Occupational Therapy Assistant           Time Calculation:   Time Calculation- OT     Row Name 01/11/20 1237             Time Calculation- OT    OT  Start Time  0904  -ALBERTO      OT Stop Time  1010  -ALBERTO      OT Time Calculation (min)  66 min  -ALBERTO      Total Timed Code Minutes- OT  66 minute(s)  -ALBERTO      OT Non-Billable Time (min)  15 min  -ALBERTO      OT Received On  01/11/20  -        User Key  (r) = Recorded By, (t) = Taken By, (c) = Cosigned By    Initials Name Provider Type    La Nena Hoang OTA Occupational Therapy Assistant        Therapy Charges for Today     Code Description Service Date Service Provider Modifiers Qty    [id number] HC OT SELF CARE/MGMT/TRAIN EA 15 MIN 1/11/2020 La Nena Ramirez OTA GO 3               MISHA Myers  1/11/2020

## 2020-01-12 PROCEDURE — 97530 THERAPEUTIC ACTIVITIES: CPT

## 2020-01-12 PROCEDURE — 99024 POSTOP FOLLOW-UP VISIT: CPT | Performed by: SURGERY

## 2020-01-12 PROCEDURE — 25010000002 ENOXAPARIN PER 10 MG: Performed by: SURGERY

## 2020-01-12 PROCEDURE — 97535 SELF CARE MNGMENT TRAINING: CPT

## 2020-01-12 PROCEDURE — 97110 THERAPEUTIC EXERCISES: CPT

## 2020-01-12 PROCEDURE — 97116 GAIT TRAINING THERAPY: CPT

## 2020-01-12 RX ORDER — HYDROCODONE BITARTRATE AND ACETAMINOPHEN 5; 325 MG/1; MG/1
1 TABLET ORAL EVERY 6 HOURS PRN
Status: DISCONTINUED | OUTPATIENT
Start: 2020-01-12 | End: 2020-01-13 | Stop reason: HOSPADM

## 2020-01-12 RX ADMIN — FAMOTIDINE 20 MG: 10 INJECTION INTRAVENOUS at 08:52

## 2020-01-12 RX ADMIN — POTASSIUM CHLORIDE, DEXTROSE MONOHYDRATE AND SODIUM CHLORIDE 50 ML/HR: 150; 5; 450 INJECTION, SOLUTION INTRAVENOUS at 17:56

## 2020-01-12 RX ADMIN — ENOXAPARIN SODIUM 40 MG: 40 INJECTION SUBCUTANEOUS at 08:52

## 2020-01-12 RX ADMIN — ACETAMINOPHEN 1000 MG: 500 TABLET ORAL at 08:52

## 2020-01-12 RX ADMIN — POTASSIUM CHLORIDE, DEXTROSE MONOHYDRATE AND SODIUM CHLORIDE 100 ML/HR: 150; 5; 450 INJECTION, SOLUTION INTRAVENOUS at 02:45

## 2020-01-12 RX ADMIN — METOPROLOL TARTRATE 12.5 MG: 25 TABLET, FILM COATED ORAL at 10:15

## 2020-01-12 RX ADMIN — METOPROLOL TARTRATE 12.5 MG: 25 TABLET, FILM COATED ORAL at 19:56

## 2020-01-12 RX ADMIN — FAMOTIDINE 20 MG: 10 INJECTION INTRAVENOUS at 19:56

## 2020-01-12 RX ADMIN — ACETAMINOPHEN 1000 MG: 500 TABLET ORAL at 02:45

## 2020-01-12 RX ADMIN — LOSARTAN POTASSIUM: 50 TABLET, FILM COATED ORAL at 10:15

## 2020-01-12 NOTE — PLAN OF CARE
Problem: Patient Care Overview  Goal: Plan of Care Review  1/12/2020 1449 by Paolo Gonzales, BRYCE  Outcome: Ongoing (interventions implemented as appropriate)  Flowsheets (Taken 1/12/2020 1346)  Progress: improving  Plan of Care Reviewed With: friend; spouse  Outcome Summary:Pt able to t/f with sup sup to sit back to sup. Pt stood with SBA and amb without AD for 612ft including up/down 5 steps x 2 with sba of 1. Pt voiced no c/o during or after tx and although BP cont to be slightly elevated, pt was nonsymptomatic throughout tx. Pt would cont to benefit from therapy upon DC.

## 2020-01-12 NOTE — THERAPY TREATMENT NOTE
Acute Care - Occupational Therapy Treatment Note  [facility]     Patient Name: Sharon Esposito  : 1945  MRN: [id number]  Today's Date: 2020  Onset of Illness/Injury or Date of Surgery: 20  Date of Referral to OT: 01/10/20  Referring Physician: ELLIE Mcneill MD    Admit Date: 2020       ICD-10-CM ICD-9-CM   1. Adenomatous polyp D36.9 229.9   2. Decreased functional mobility R26.89 781.99   3. Impaired mobility and activities of daily living Z74.09 799.89     Patient Active Problem List   Diagnosis   • Need for hepatitis C screening test   • Hyperlipidemia   • Acquired hypothyroidism   • Seasonal allergies   • Osteopenia   • Essential hypertension   • Edema   • Diverticulitis of colon   • Allergic rhinitis   • Postmenopausal   • Ingrown toenail   • Paresthesia of both feet   • Type 2 diabetes mellitus without complication, without long-term current use of insulin (CMS/HCC)   • Cobalamin deficiency   • Diabetic peripheral neuropathy (CMS/HCC)   • Encounter for screening for malignant neoplasm of colon   • Villous adenoma of colon 2019   • Adenomatous polyp     Past Medical History:   Diagnosis Date   • Acquired hypothyroidism     with hashimoto's thyroiditis      • Acute bronchitis    • Acute sinusitis    • Allergic rhinitis    • Bilateral hand pain    • Cough    • Diabetes mellitus (CMS/HCC)    • Diverticulitis of colon    • Edema     idiopathic state, history of      • Encounter for gynecological examination (general) (routine) without abnormal findings    • Encounter for screening for osteoporosis    • Essential hypertension    • Hemorrhoids     internal & external      • Hx of bone density study     DEXA BONE DENSITY APPENDICULAR: osteopenia, 2013   • Hx of screening mammography     MAMMOGRAM SCREENING: x2, 2014   • Hyperlipidemia     with elevated low density lipoprotein   • 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     history   •  Infection of skin and subcutaneous tissue     Infection of skin AND/OR subcutaneous tissue      • Onychogryposis     recurrent ingrown toenail      • Open wound of face    • Osteopenia    • Screening for malignant neoplasm of breast    • Screening for osteoporosis    • Seasonal allergies     Seasonal allergy      • Vitamin deficiency    • Wears glasses      Past Surgical History:   Procedure Laterality Date   • CATARACT EXTRACTION, BILATERAL     • COLON RESECTION N/A 1/9/2020    Procedure: COLON RESECTION LAPAROSCOPIC CONVERTED TO OPEN;  Surgeon: Ozzy Young MD;  Location: [facility] OR;  Service: General   • COLONOSCOPY      Approximately 04/2009   • COLONOSCOPY N/A 8/14/2019    Procedure: COLONOSCOPY Monday or Wed;  Surgeon: Tony Curiel MD;  Location: [facility] ENDOSCOPY;  Service: Gastroenterology   • COLONOSCOPY N/A 11/13/2019    Procedure: COLONOSCOPY A Wed in Nov;  Surgeon: Tony Curiel MD;  Location: [facility] ENDOSCOPY;  Service: Gastroenterology   • COLONOSCOPY N/A 1/9/2020    Procedure: COLONOSCOPY    DONE IN OR WITH ENDO             (colonoscpy first);  Surgeon: Ozzy Young MD;  Location: [facility] OR;  Service: General   • ENDOSCOPY AND COLONOSCOPY  04/20/2009    A few small diverticula in the sigmoid & the descending colon. Small internal & external hemorrhoids were present. Colonoscopy otherwise normal   • INJECTION OF MEDICATION  06/23/2015    Celestone (betamethasone) x2   • INJECTION OF MEDICATION  12/08/2014    Toradol    • PAP SMEAR  06/23/2009    Negative for intraepithelial lesion or malignancy   • SKIN BIOPSY  09/30/2010    L neck lesion- seborrheic keratosis. right infraorbital-seborrheic keratosis, right lateral orbital- intradermal nevus. forehead lesion-early squamous papilloma       Therapy Treatment    Rehabilitation Treatment Summary     Row Name 01/12/20 0920 01/12/20 0718          Treatment Time/Intention    Discipline  physical therapy assistant  -TW  occupational therapy  assistant  -KD     Document Type  therapy note (daily note)  -TW  therapy note (daily note)  -KD     Subjective Information  no complaints  -TW  no complaints  -KD     Mode of Treatment  physical therapy;individual therapy  -TW  occupational therapy  -KD     Patient/Family Observations  Pt up in chair with no familypresent.  -TW  Supine in bed  -KD     Therapy Frequency (OT Eval)  --  daily  -KD     Patient Effort  good  -TW  good  -KD     Existing Precautions/Restrictions  fall Pt no longer on O2.   -TW  fall  -KD     Recorded by [TW] Paolo Gonzales, BRYCE 01/12/20 1138 [KD] Pao Mejia COTA/L 01/12/20 1304     Row Name 01/12/20 0920 01/12/20 0718          Vital Signs    Pre Systolic BP Rehab  212  -TW  170  -KD     Pre Treatment Diastolic BP  94  -TW  86  -KD     Post Systolic BP Rehab  206  -TW  --     Post Treatment Diastolic BP  88  -TW  --     Pretreatment Heart Rate (beats/min)  84  -TW  82  -KD     Posttreatment Heart Rate (beats/min)  80  -TW  83  -KD     Pre SpO2 (%)  94  -TW  94  -KD     O2 Delivery Pre Treatment  room air  -TW  room air  -KD     Intra SpO2 (%)  95  -TW  --     Post SpO2 (%)  96  -TW  93  -KD     O2 Delivery Post Treatment  room air  -TW  room air  -KD     Pre Patient Position  Sitting  -TW  Supine  -KD     Intra Patient Position  Standing  -TW  Standing  -KD     Post Patient Position  Sitting  -TW  Sitting  -KD     Recorded by [TW] Paolo Gonzales PTA 01/12/20 1138 [KD] Pao Mejia COTA/L 01/12/20 1308     Row Name 01/12/20 0920 01/12/20 0718          Cognitive Assessment/Intervention- PT/OT    Affect/Mental Status (Cognitive)  WFL  -TW  WFL  -KD     Orientation Status (Cognition)  oriented x 4  -TW  oriented x 4  -KD     Follows Commands (Cognition)  WFL  -TW  WFL  -KD     Cognitive Function (Cognitive)  WFL  -TW  WFL  -KD     Personal Safety Interventions  fall prevention program maintained;gait belt;nonskid shoes/slippers when out of bed  -TW  fall prevention program  maintained  -KD     Recorded by [TW] Paolo Gonzales, BRYCE 01/12/20 1138 [KD] Pao Mejia COTA/L 01/12/20 1308     Row Name 01/12/20 0920             Safety Issues, Functional Mobility    Impairments Affecting Function (Mobility)  coordination;endurance/activity tolerance;pain;strength  -TW      Recorded by [TW] Paolo Gonzales, PTA 01/12/20 1138      Row Name 01/12/20 0920 01/12/20 0718          Bed Mobility Assessment/Treatment    Supine-Sit-Supine Cassia (Bed Mobility)  --  conditional independence  -KD     Assistive Device (Bed Mobility)  --  head of bed elevated;bed rails  -KD     Comment (Bed Mobility)  Pt up in chair upon arrival and returned to chair after tx.  -TW  --     Recorded by [TW] Paolo Gonzales, PTA 01/12/20 1138 [KD] Pao Mejia TORRES/L 01/12/20 1308     Row Name 01/12/20 0718             Functional Mobility    Functional Mobility- Ind. Level  contact guard assist  -KD      Functional Mobility- Device  rolling walker  -KD      Functional Mobility-Distance (Feet)  608  -KD      Functional Mobility- Comment  Pt required 1 standing RB 2' 02 decreased to 87%, then araceli w/ PLB to 91%  -KD      Recorded by [KD] Pao Mejia TORRES/L 01/12/20 1308      Row Name 01/12/20 0920             Transfer Assessment/Treatment    Transfer Assessment/Treatment  sit-stand transfer;stand-sit transfer  -TW      Recorded by [TW] Paolo Gonzales, PTA 01/12/20 1138      Row Name 01/12/20 0920 01/12/20 0718          Sit-Stand Transfer    Sit-Stand Cassia (Transfers)  stand by assist  -TW  supervision  -KD     Assistive Device (Sit-Stand Transfers)  walker, front-wheeled  -TW  -- no AD  -KD     Recorded by [TW] Paolo Gonzales, PTA 01/12/20 1138 [KD] Pao Mejia TORRES/L 01/12/20 1308     Row Name 01/12/20 0920 01/12/20 0718          Stand-Sit Transfer    Stand-Sit Cass[place] (Transfers)  stand by assist  -TW  supervision  -KD     Assistive Device (Stand-Sit Transfers)   walker, front-wheeled  -TW  --     Recorded by [TW] Paolo Gonzales, PTA 01/12/20 1138 [KD] Poa Mejia COTA/L 01/12/20 1308     Row Name 01/12/20 0718             Toilet Transfer    Type (Toilet Transfer)  sit-stand;stand-sit  -KD      [place] Level (Toilet Transfer)  supervision  -KD      Assistive Device (Toilet Transfer)  -- No AD  -KD      Recorded by [KD] Pao Mejia COTA/L 01/12/20 1308      Row Name 01/12/20 0920             Gait/Stairs Assessment/Training    Gait/Stairs Assessment/Training  gait/ambulation assistive device  -TW      [place] Level (Gait)  stand by assist;contact guard  -TW      Assistive Device (Gait)  walker, front-wheeled  -TW      Distance in Feet (Gait)  600ft  -TW      Deviations/Abnormal Patterns (Gait)  armando decreased  -TW      [place] Level (Stairs)  stand by assist  -TW      Handrail Location (Stairs)  right side (ascending)  -TW      Number of Steps (Stairs)  5x2  -TW      Ascending Technique (Stairs)  step-over-step  -TW      Descending Technique (Stairs)  step-over-step  -TW      Recorded by [TW] Paolo Gonzales, PTA 01/12/20 1138      Row Name 01/12/20 0718             Toileting Assessment/Training    [place] Level (Toileting)  toileting skills;bridging;perform perineal hygiene;supervision  -KD      Assistive Devices (Toileting)  commode;grab bar/safety frame  -KD      Toileting Position  supported sitting  -KD      Recorded by [KD] Pao Mejia COTA/L 01/12/20 1308      Row Name 01/12/20 0920             Therapeutic Exercise    Lower Extremity (Therapeutic Exercise)  LAQ (long arc quad), bilateral;marching while seated  -TW      Lower Extremity Range of Motion (Therapeutic Exercise)  hip abduction/adduction, bilateral;ankle dorsiflexion/plantar flexion, bilateral  -TW      Exercise Type (Therapeutic Exercise)  AROM (active range of motion)  -TW      Position (Therapeutic Exercise)  seated  -TW      Sets/Reps (Therapeutic Exercise)   2/15  -TW      Recorded by [TW] Paolo Gonzales PTA 01/12/20 1138      Row Name 01/12/20 0920 01/12/20 0718          Positioning and Restraints    Pre-Treatment Position  sitting in chair/recliner  -TW  in bed  -KD     Post Treatment Position  chair  -TW  chair  -KD     In Bed  --  sitting;call light within reach;encouraged to call for assist;exit alarm on  -KD     In Chair  sitting;call light within reach;encouraged to call for assist;exit alarm on  -TW  --     Recorded by [TW] Paolo Gonzales PTA 01/12/20 1138 [KD] Pao Mejia TORRES/L 01/12/20 1308     Row Name 01/12/20 0920 01/12/20 0718          Pain Scale: Numbers Pre/Post-Treatment    Pain Scale: Numbers, Pretreatment  0/10 - no pain  -TW  0/10 - no pain  -KD     Pain Scale: Numbers, Post-Treatment  0/10 - no pain  -TW  0/10 - no pain  -KD     Recorded by [TW] Paolo Gonzales PTA 01/12/20 1138 [KD] Pao Mejia TORRES/L 01/12/20 1308     Row Name                Wound 01/09/20 0816 abdomen Incision    Wound - Properties Group Date first assessed: 01/09/20 [TR] Time first assessed: 0816 [TR] Present on Hospital Admission: N [TR] Location: abdomen [TR] Primary Wound Type: Incision [TR] Recorded by:  [TR] Ariane Menendez RN 01/09/20 0832    Row Name 01/12/20 0718             Outcome Summary/Treatment Plan (OT)    Daily Summary of Progress (OT)  progress toward functional goals as expected  -KD      Plan for Continued Treatment (OT)  cont ot poc  -KD      Anticipated Discharge Disposition (OT)  anticipate therapy at next level of care  -KD      Recorded by [KD] Pao Mejia TORRES/L 01/12/20 1308      Row Name 01/12/20 0920             Outcome Summary/Treatment Plan (PT)    Daily Summary of Progress (PT)  progress toward functional goals as expected  -TW      Plan for Continued Treatment (PT)  Cont  -TW      Anticipated Discharge Disposition (PT)  anticipate therapy at next level of care  -TW      Recorded by [TW] Paolo Gonzales PTA  01/12/20 1138        User Key  (r) = Recorded By, (t) = Taken By, (c) = Cosigned By    Initials Name Effective Dates Discipline    TW Paolo Gonzales, PTA 03/07/18 -  PT    KD Pao Mejia, TORRES/L 03/07/18 -  OT    Ariane Vernon, RN 10/17/16 -  Nurse        Wound 01/09/20 0816 abdomen Incision (Active)   Dressing Appearance intact;dry 1/12/2020  8:52 AM   Closure GIGI 1/12/2020  8:52 AM   Base dressing in place, unable to visualize 1/12/2020  8:52 AM   Drainage Amount none 1/12/2020  8:52 AM     Rehab Goal Summary     Row Name 01/12/20 0920 01/12/20 0718          Physical Therapy Goals    Problem Specific Goal Selection (PT)  problem specific goal 2, PT;problem specific goal 1, PT  -TW  --     Additional Documentation  Problem Specific Goal Selection (PT) (Row)  -TW  --        Bed Mobility Goal 1 (PT)    Activity/Assistive Device (Bed Mobility Goal 1, PT)  sit to supine;supine to sit  -TW  --     [place] Level/Cues Needed (Bed Mobility Goal 1, PT)  independent  -TW  --     Time Frame (Bed Mobility Goal 1, PT)  by discharge  -TW  --     Progress/Outcomes (Bed Mobility Goal 1, PT)  goal not met  -TW  --        Transfer Goal 1 (PT)    Activity/Assistive Device (Transfer Goal 1, PT)  bed-to-chair/chair-to-bed  -TW  --     [place] Level/Cues Needed (Transfer Goal 1, PT)  independent;conditional independence  -TW  --     Time Frame (Transfer Goal 1, PT)  by discharge  -TW  --     Progress/Outcome (Transfer Goal 1, PT)  goal not met  -TW  --        Gait Training Goal 1 (PT)    Activity/Assistive Device (Gait Training Goal 1, PT)  gait (walking locomotion);assistive device use;backward stepping  -TW  --     [place] Level (Gait Training Goal 1, PT)  independent  -TW  --     Distance (Gait Goal 1, PT)  600 ftx1  -TW  --     Time Frame (Gait Training Goal 1, PT)  by discharge  -TW  --     Progress/Outcome (Gait Training Goal 1, PT)  goal not met  -TW  --        Stairs Goal 1 (PT)    Activity/Assistive  Device (Stairs Goal 1, PT)  stairs, all skills;ascending stairs;descending stairs  -TW  --     [place] Level/Cues Needed (Stairs Goal 1, PT)  independent  -TW  --     Number of Stairs (Stairs Goal 1, PT)  3   -TW  --     Time Frame (Stairs Goal 1, PT)  by discharge  -TW  --     Progress/Outcome (Stairs Goal 1, PT)  goal not met  -TW  --        Problem Specific Goal 1 (PT)    Problem Specific Goal 1 (PT)  Patient will completed tinetti balance and gait with medically appropriate.  -TW  --     Time Frame (Problem Specific Goal 1, PT)  2 days  -TW  --     Progress/Outcome (Problem Specific Goal 1, PT)  goal not met  -TW  --        Problem Specific Goal 2 (PT)    Problem Specific Goal 2 (PT)  Patient will score >24/28 on tinetti to indicate low fall risk.  -TW  --     Time Frame (Problem Specific Goal 2, PT)  by discharge  -TW  --     Progress/Outcome (Problem Specific Goal 2, PT)  goal not met  -TW  --        Patient Education Goal (PT)    Activity (Patient Education Goal, PT)  Patient will understand the how to implent intentional walking program to improve CV endurance and strength  -TW  --     [place]/Cues/Accuracy (Memory Goal 2, PT)  demonstrates adequately;verbalizes understanding  -TW  --     Time Frame (Patient Education Goal, PT)  by discharge  -TW  --     Progress/Outcome (Patient Education Goal, PT)  goal not met  -TW  --        Occupational Therapy Goals    Transfer Goal Selection (OT)  --  transfer, OT goal 1  -KD     Bathing Goal Selection (OT)  --  bathing, OT goal 1  -KD     Dressing Goal Selection (OT)  --  dressing, OT goal 1  -KD     Toileting Goal Selection (OT)  --  toileting, OT goal 1  -KD     Grooming Goal Selection (OT)  --  grooming, OT goal 1  -KD     Activity Tolerance Goal Selection (OT)  --  activity tolerance, OT goal 1  -KD        Transfer Goal 1 (OT)    Activity/Assistive Device (Transfer Goal 1, OT)  --  transfers, all  -KD     [place] Level/Cues Needed (Transfer Goal 1,  OT)  --  verbal cues required;tactile cues required;set-up required;supervision required  -KD     Time Frame (Transfer Goal 1, OT)  --  long term goal (LTG);by discharge  -KD     Progress/Outcome (Transfer Goal 1, OT)  --  goal met  -KD        Bathing Goal 1 (OT)    Activity/Assistive Device (Bathing Goal 1, OT)  --  bathing skills, all  -KD     [place] Level/Cues Needed (Bathing Goal 1, OT)  --  standby assist;verbal cues required;tactile cues required;set-up required  -KD     Time Frame (Bathing Goal 1, OT)  --  long term goal (LTG);by discharge  -KD     Progress/Outcomes (Bathing Goal 1, OT)  --  goal not met  -KD        Dressing Goal 1 (OT)    Activity/Assistive Device (Dressing Goal 1, OT)  --  dressing skills, all  -KD     [place]/Cues Needed (Dressing Goal 1, OT)  --  standby assist;verbal cues required;tactile cues required;set-up required  -KD     Time Frame (Dressing Goal 1, OT)  --  long term goal (LTG);by discharge  -KD     Progress/Outcome (Dressing Goal 1, OT)  --  goal not met  -KD        Toileting Goal 1 (OT)    Activity/Device (Toileting Goal 1, OT)  --  toileting skills, all  -KD     [place] Level/Cues Needed (Toileting Goal 1, OT)  --  supervision required;verbal cues required;tactile cues required;set-up required  -KD     Time Frame (Toileting Goal 1, OT)  --  long term goal (LTG);by discharge  -KD     Progress/Outcome (Toileting Goal 1, OT)  --  goal met  -KD        Grooming Goal 1 (OT)    Activity/Device (Grooming Goal 1, OT)  --  grooming skills, all  -KD     [place] (Grooming Goal 1, OT)  --  supervision required;verbal cues required;tactile cues required;set-up required  -KD     Time Frame (Grooming Goal 1, OT)  --  long term goal (LTG);by discharge  -KD     Progress/Outcome (Grooming Goal 1, OT)  --  goal not met  -KD         Activity Tolerance Goal 1 (OT)    Activity Level (Endurance Goal 1, OT)  --  -- 20 min functional activity with 1-2 rest breaks   -KD     Time  Frame (Activity Tolerance Goal 1, OT)  --  long term goal (LTG)  -KD     Progress/Outcome (Activity Tolerance Goal 1, OT)  --  goal not met  -KD       User Key  (r) = Recorded By, (t) = Taken By, (c) = Cosigned By    Initials Name Provider Type Discipline    TW Paolo Gonzales, PTA Physical Therapy Assistant PT    KD Pao Mejia, TORRES/L Occupational Therapy Assistant OT            OT Recommendation and Plan  Outcome Summary/Treatment Plan (OT)  Daily Summary of Progress (OT): progress toward functional goals as expected  Plan for Continued Treatment (OT): cont ot poc  Anticipated Discharge Disposition (OT): anticipate therapy at next level of care  Therapy Frequency (OT Eval): daily  Daily Summary of Progress (OT): progress toward functional goals as expected  Plan of Care Review  Plan of Care Reviewed With: patient  Plan of Care Reviewed With: patient  Outcome Summary: Pt supine in bed upon entry. Sup-sit-SBA, toilet t/f-SBA, amb ~608' SBA of 1 w/ RW, pt required 1 standing RB 2' 02 sats decreased to 87% w/ gait. Pt educated and then  performed PLB tech, 02 sats then increased to 92%.  Pt ind w/ pericare care. SBA for gromming activity sink side.  Outcome Measures     Row Name 01/12/20 0920 01/12/20 0718 01/11/20 1200       How much help from another person do you currently need...    Turning from your back to your side while in flat bed without using bedrails?  4  -TW  --  --    Moving from lying on back to sitting on the side of a flat bed without bedrails?  4  -TW  --  --    Moving to and from a bed to a chair (including a wheelchair)?  3  -TW  --  --    Standing up from a chair using your arms (e.g., wheelchair, bedside chair)?  3  -TW  --  --    Climbing 3-5 steps with a railing?  3  -TW  --  --    To walk in hospital room?  3  -TW  --  --    AM-PAC 6 Clicks Score (PT)  20  -TW  --  --       How much help from another is currently needed...    Putting on and taking off regular lower body clothing?  --   2  -KD  2  -ALBERTO    Bathing (including washing, rinsing, and drying)  --  2  -KD  2  -ALBERTO    Toileting (which includes using toilet bed pan or urinal)  --  3  -KD  3  -ALBERTO    Putting on and taking off regular upper body clothing  --  3  -KD  3  -ALBERTO    Taking care of personal grooming (such as brushing teeth)  --  3  -KD  3  -ALBERTO    Eating meals  --  4  -KD  4  -ALBERTO    AM-PAC 6 Clicks Score (OT)  --  17  -KD  17  -ALBERTO       Functional Assessment    Outcome Measure Options  AM-PAC 6 Clicks Basic Mobility (PT)  -TW  --  AM-PAC 6 Clicks Daily Activity (OT)  -ALBERTO    Row Name 01/11/20 0933 01/10/20 1507 01/10/20 0937       How much help from another person do you currently need...    Turning from your back to your side while in flat bed without using bedrails?  3  -TW  3  -PAOLA  --    Moving from lying on back to sitting on the side of a flat bed without bedrails?  3  -TW  3  -PAOLA  --    Moving to and from a bed to a chair (including a wheelchair)?  3  -TW  3  -PAOLA  --    Standing up from a chair using your arms (e.g., wheelchair, bedside chair)?  3  -TW  3  -PAOLA  --    Climbing 3-5 steps with a railing?  2  -TW  2  -PAOLA  --    To walk in hospital room?  3  -TW  3  -PAOLA  --    AM-PAC 6 Clicks Score (PT)  17 -TW  17  -PAOLA  --       How much help from another is currently needed...    Putting on and taking off regular lower body clothing?  --  --  2  -JH    Bathing (including washing, rinsing, and drying)  --  --  2  -JH    Toileting (which includes using toilet bed pan or urinal)  --  --  2  -JH    Putting on and taking off regular upper body clothing  --  --  3  -JH    Taking care of personal grooming (such as brushing teeth)  --  --  3  -JH    Eating meals  --  --  4  -JH    AM-PAC 6 Clicks Score (OT)  --  --  16  -JH       Functional Assessment    Outcome Measure Options  AM-PAC 6 Clicks Basic Mobility (PT)  -TW  AM-PAC 6 Clicks Basic Mobility (PT)  -PAOAL  AM-PAC 6 Clicks Daily Activity (OT)  -      User Key  (r) = Recorded By, (t) =  Taken By, (c) = Cosigned By    Initials Name Provider Type    Timothy Rebolledo, PTA Physical Therapy Assistant    TW Paolo Gonzales, BRYCE Physical Therapy Assistant    Pao Pelayo COTA/L Occupational Therapy Assistant    Brent Al, NADIRA Occupational Therapist    La Nena Hoang OTA Occupational Therapy Assistant           Time Calculation:   Time Calculation- OT     Row Name 01/12/20 1313             Time Calculation- OT    OT Start Time  0726  -KD      OT Stop Time  0804  -KD      OT Time Calculation (min)  38 min  -KD      Total Timed Code Minutes- OT  38 minute(s)  -KD      OT Received On  01/12/20  -KD        User Key  (r) = Recorded By, (t) = Taken By, (c) = Cosigned By    Initials Name Provider Type    Pao Pelayo COTA/L Occupational Therapy Assistant        Therapy Charges for Today     Code Description Service Date Service Provider Modifiers Qty    35846801143 HC OT THERAPEUTIC ACT EA 15 MIN 1/12/2020 Pao Mejia COTA/L GO 2    68743126807 HC OT SELF CARE/MGMT/TRAIN EA 15 MIN 1/12/2020 Pao Mejia COTA/L GO 1               MELISSA Bar/JANICE  1/12/2020

## 2020-01-12 NOTE — PLAN OF CARE
Problem: Patient Care Overview  Goal: Plan of Care Review  Outcome: Ongoing (interventions implemented as appropriate)  Flowsheets (Taken 1/12/2020 0718)  Progress: improving  Plan of Care Reviewed With: patient  Outcome Summary: Pt supine in bed upon entry. Sup-sit-SBA, toilet t/f-SBA, amb ~608' SBA of 1 w/ RW, pt required 1 standing RB 2' 02 sats decreased to 87% w/ gait. Pt educated and then performed PLB tech, 02 sats then increased to 92%.  Pt ind w/ pericare care. SBA for grooming activity sink side. Cont POC.

## 2020-01-12 NOTE — PLAN OF CARE
Pt resting between care. BP elevated, other VSS. Pt only c/o soreness that is improved with tylenol.

## 2020-01-12 NOTE — PLAN OF CARE
Problem: Patient Care Overview  Goal: Plan of Care Review  Outcome: Ongoing (interventions implemented as appropriate)  Flowsheets (Taken 1/12/2020 9999)  Progress: no change  Plan of Care Reviewed With: patient  Outcome Summary: pt tolerating reg diet amd walking halls

## 2020-01-12 NOTE — THERAPY TREATMENT NOTE
Acute Care - Physical Therapy Treatment Note  [facility]     Patient Name: Sharon Esposito  : 1945  MRN: [id number]  Today's Date: 2020  Onset of Illness/Injury or Date of Surgery: 20     Referring Physician: ELLIE Mcneill MD    Admit Date: 2020    Visit Dx:    ICD-10-CM ICD-9-CM   1. Adenomatous polyp D36.9 229.9   2. Decreased functional mobility R26.89 781.99   3. Impaired mobility and activities of daily living Z74.09 799.89     Patient Active Problem List   Diagnosis   • Need for hepatitis C screening test   • Hyperlipidemia   • Acquired hypothyroidism   • Seasonal allergies   • Osteopenia   • Essential hypertension   • Edema   • Diverticulitis of colon   • Allergic rhinitis   • Postmenopausal   • Ingrown toenail   • Paresthesia of both feet   • Type 2 diabetes mellitus without complication, without long-term current use of insulin (CMS/HCC)   • Cobalamin deficiency   • Diabetic peripheral neuropathy (CMS/HCC)   • Encounter for screening for malignant neoplasm of colon   • Villous adenoma of colon 2019   • Adenomatous polyp       Therapy Treatment    Rehabilitation Treatment Summary     Row Name 20 1346 20 0920 20 0718       Treatment Time/Intention    Discipline  physical therapy assistant  -TW  physical therapy assistant  -TW  occupational therapy assistant  -KD    Document Type  therapy note (daily note)  -TW  therapy note (daily note)  -TW  therapy note (daily note)  -KD    Subjective Information  no complaints  -TW  no complaints  -TW  no complaints  -KD    Mode of Treatment  physical therapy;individual therapy  -TW  physical therapy;individual therapy  -TW  occupational therapy  -KD    Patient/Family Observations  Pt with friend/family present.  -TW  Pt up in chair with no familypresent.  -TW  Supine in bed  -KD    Therapy Frequency (OT Eval)  --  --  daily  -KD    Patient Effort  good  -TW  good  -TW  good  -KD    Existing Precautions/Restrictions   fall Pt no longer on O2.   -TW  fall Pt no longer on O2.   -TW  fall  -KD    Recorded by [TW] Paolo Gonzales, PTA 01/12/20 1449 [TW] Paolo Gonzales, PTA 01/12/20 1138 [KD] Pao Mejia COTA/L 01/12/20 1304    Row Name 01/12/20 1346 01/12/20 0920 01/12/20 0718       Vital Signs    Pre Systolic BP Rehab  196  -TW  212  -TW  170  -KD    Pre Treatment Diastolic BP  81  -TW  94  -TW  86  -KD    Post Systolic BP Rehab  208  -TW  206  -TW  --    Post Treatment Diastolic BP  92 Nsg aware  -TW  88  -TW  --    Pretreatment Heart Rate (beats/min)  80  -TW  84  -TW  82  -KD    Posttreatment Heart Rate (beats/min)  86  -TW  80  -TW  83  -KD    Pre SpO2 (%)  92  -TW  94  -TW  94  -KD    O2 Delivery Pre Treatment  room air  -TW  room air  -TW  room air  -KD    Intra SpO2 (%)  --  95  -TW  --    Post SpO2 (%)  92  -TW  96  -TW  93  -KD    O2 Delivery Post Treatment  --  room air  -TW  room air  -KD    Pre Patient Position  Supine  -TW  Sitting  -TW  Supine  -KD    Intra Patient Position  Standing  -TW  Standing  -TW  Standing  -KD    Post Patient Position  Supine  -TW  Sitting  -TW  Sitting  -KD    Recorded by [TW] Paolo Gonzales, PTA 01/12/20 1449 [TW] Paolo Gonzales, PTA 01/12/20 1138 [KD] Pao Mejia COTA/L 01/12/20 1308    Row Name 01/12/20 1346 01/12/20 0920 01/12/20 0718       Cognitive Assessment/Intervention- PT/OT    Affect/Mental Status (Cognitive)  WFL  -TW  WFL  -TW  WFL  -KD    Orientation Status (Cognition)  oriented x 4  -TW  oriented x 4  -TW  oriented x 4  -KD    Follows Commands (Cognition)  WFL  -TW  WFL  -TW  WFL  -KD    Cognitive Function (Cognitive)  WFL  -TW  WFL  -TW  WFL  -KD    Personal Safety Interventions  fall prevention program maintained;gait belt;nonskid shoes/slippers when out of bed  -TW  fall prevention program maintained;gait belt;nonskid shoes/slippers when out of bed  -TW  fall prevention program maintained  -KD    Recorded by [TW] Paolo Gonzales, PTA 01/12/20  1449 [TW] Paolo Gonzales, PTA 01/12/20 1138 [KD] Pao Mejia COTA/L 01/12/20 1308    Row Name 01/12/20 1346 01/12/20 0920          Safety Issues, Functional Mobility    Impairments Affecting Function (Mobility)  coordination;endurance/activity tolerance;pain;strength  -TW  coordination;endurance/activity tolerance;pain;strength  -TW     Recorded by [TW] Paolo Gonzales, PTA 01/12/20 1449 [TW] Paolo Gonzales, PTA 01/12/20 1138     Row Name 01/12/20 1346 01/12/20 0920 01/12/20 0718       Bed Mobility Assessment/Treatment    Supine-Sit-Supine [place] (Bed Mobility)  conditional independence  -TW  --  conditional independence  -KD    Assistive Device (Bed Mobility)  head of bed elevated  -TW  --  head of bed elevated;bed rails  -KD    Comment (Bed Mobility)  --  Pt up in chair upon arrival and returned to chair after tx.  -TW  --    Recorded by [TW] Paolo Gonzales, PTA 01/12/20 1449 [TW] Paolo Gonzales, PTA 01/12/20 1138 [KD] Pao Mejia COTA/L 01/12/20 1308    Row Name 01/12/20 0718             Functional Mobility    Functional Mobility- Ind. Level  contact guard assist  -KD      Functional Mobility- Device  rolling walker  -KD      Functional Mobility-Distance (Feet)  608  -KD      Functional Mobility- Comment  Pt required 1 standing RB 2' 02 decreased to 87%, then araceli w/ PLB to 91%  -KD      Recorded by [KD] Pao Mejia COTA/L 01/12/20 1308      Row Name 01/12/20 1346 01/12/20 0920          Transfer Assessment/Treatment    Transfer Assessment/Treatment  sit-stand transfer;stand-sit transfer  -TW  sit-stand transfer;stand-sit transfer  -TW     Recorded by [TW] Paolo Gonzales, PTA 01/12/20 1449 [TW] Paolo Gonzales, PTA 01/12/20 1138     Row Name 01/12/20 1346 01/12/20 0920 01/12/20 0718       Sit-Stand Transfer    Sit-Stand [place] (Transfers)  stand by assist;supervision  -TW  stand by assist  -TW  supervision  -KD    Assistive Device (Sit-Stand Transfers)   other (see comments) None  -TW  walker, front-wheeled  -TW  -- no AD  -KD    Recorded by [TW] Paolo Gonzales, PTA 01/12/20 1449 [TW] Paolo Gonzales, PTA 01/12/20 1138 [KD] Pao Mejia TORRES/L 01/12/20 1308    Row Name 01/12/20 1346 01/12/20 0920 01/12/20 0718       Stand-Sit Transfer    Stand-Sit [place] (Transfers)  supervision  -TW  stand by assist  -TW  supervision  -KD    Assistive Device (Stand-Sit Transfers)  other (see comments) None  -TW  walker, front-wheeled  -TW  --    Recorded by [TW] Paolo Gonzales, PTA 01/12/20 1449 [TW] Paolo Gonzales, PTA 01/12/20 1138 [KD] Pao Mejia TORRES/L 01/12/20 1308    Row Name 01/12/20 0718             Toilet Transfer    Type (Toilet Transfer)  sit-stand;stand-sit  -KD      [place] Level (Toilet Transfer)  supervision  -KD      Assistive Device (Toilet Transfer)  -- No AD  -KD      Recorded by [KD] Pao Mejia TORRES/L 01/12/20 1308      Row Name 01/12/20 1346 01/12/20 0920          Gait/Stairs Assessment/Training    Gait/Stairs Assessment/Training  gait/ambulation independence  -TW  gait/ambulation assistive device  -TW     [place] Level (Gait)  stand by assist  -TW  stand by assist;contact guard  -TW     Assistive Device (Gait)  other (see comments) None  -TW  walker, front-wheeled  -TW     Distance in Feet (Gait)  612ft  -TW  600ft  -TW     Deviations/Abnormal Patterns (Gait)  armando decreased  -TW  armando decreased  -TW     [place] Level (Stairs)  stand by assist  -TW  stand by assist  -TW     Handrail Location (Stairs)  right side (ascending)  -TW  right side (ascending)  -TW     Number of Steps (Stairs)  5x 2  -TW  5x2  -TW     Ascending Technique (Stairs)  step-over-step  -TW  step-over-step  -TW     Descending Technique (Stairs)  step-over-step  -TW  step-over-step  -TW     Recorded by [TW] Paolo Gonzales, BRYCE 01/12/20 1449 [TW] Paolo Gonzales, BRYCE 01/12/20 1138     Row Name 01/12/20 0718              Toileting Assessment/Training    [place] Level (Toileting)  toileting skills;bridging;perform perineal hygiene;supervision  -KD      Assistive Devices (Toileting)  commode;grab bar/safety frame  -KD      Toileting Position  supported sitting  -KD      Recorded by [KD] Pao Mejia COTA/L 01/12/20 1308      Row Name 01/12/20 1346 01/12/20 0920          Therapeutic Exercise    Lower Extremity (Therapeutic Exercise)  LAQ (long arc quad), bilateral;marching while seated  -TW  LAQ (long arc quad), bilateral;marching while seated  -TW     Lower Extremity Range of Motion (Therapeutic Exercise)  hip abduction/adduction, bilateral;ankle dorsiflexion/plantar flexion, bilateral  -TW  hip abduction/adduction, bilateral;ankle dorsiflexion/plantar flexion, bilateral  -TW     Exercise Type (Therapeutic Exercise)  AROM (active range of motion)  -TW  AROM (active range of motion)  -TW     Position (Therapeutic Exercise)  seated  -TW  seated  -TW     Sets/Reps (Therapeutic Exercise)  2/15  -TW  2/15  -TW     Recorded by [TW] Paolo Gonzales PTA 01/12/20 1449 [TW] Paolo Gonzales PTA 01/12/20 1138     Row Name 01/12/20 1346 01/12/20 0920 01/12/20 0718       Positioning and Restraints    Pre-Treatment Position  in bed  -TW  sitting in chair/recliner  -TW  in bed  -KD    Post Treatment Position  bed  -TW  chair  -TW  chair  -KD    In Bed  supine;call light within reach;encouraged to call for assist;exit alarm on;with family/caregiver  -TW  --  sitting;call light within reach;encouraged to call for assist;exit alarm on  -KD    In Chair  --  sitting;call light within reach;encouraged to call for assist;exit alarm on  -TW  --    Recorded by [TW] Paolo Gonzales PTA 01/12/20 1449 [TW] Paolo Gonzales PTA 01/12/20 1138 [KD] Pao Mejia COTA/L 01/12/20 1308    Row Name 01/12/20 1346 01/12/20 0920 01/12/20 0718       Pain Scale: Numbers Pre/Post-Treatment    Pain Scale: Numbers, Pretreatment  0/10 - no pain   -TW  0/10 - no pain  -TW  0/10 - no pain  -KD    Pain Scale: Numbers, Post-Treatment  0/10 - no pain  -TW  0/10 - no pain  -TW  0/10 - no pain  -KD    Recorded by [TW] Paolo Gonzales, PTA 01/12/20 1449 [TW] Paolo Gonzales, PTA 01/12/20 1138 [KD] Pao Mejia COTA/L 01/12/20 1308    Row Name                Wound 01/09/20 0816 abdomen Incision    Wound - Properties Group Date first assessed: 01/09/20 [TR] Time first assessed: 0816 [TR] Present on Hospital Admission: N [TR] Location: abdomen [TR] Primary Wound Type: Incision [TR] Recorded by:  [TR] Ariane Menendez RN 01/09/20 0832    Row Name 01/12/20 0718             Outcome Summary/Treatment Plan (OT)    Daily Summary of Progress (OT)  progress toward functional goals as expected  -KD      Plan for Continued Treatment (OT)  cont ot poc  -KD      Anticipated Discharge Disposition (OT)  anticipate therapy at next level of care  -KD      Recorded by [KD] Pao Mejia COTA/L 01/12/20 1308      Row Name 01/12/20 1346 01/12/20 0920          Outcome Summary/Treatment Plan (PT)    Daily Summary of Progress (PT)  progress toward functional goals is good  -TW  progress toward functional goals as expected  -TW     Plan for Continued Treatment (PT)  Cont   -TW  Cont  -TW     Anticipated Discharge Disposition (PT)  anticipate therapy at next level of care  -TW  anticipate therapy at next level of care  -TW     Recorded by [TW] Paolo Gonzales, PTA 01/12/20 1449 [TW] Paolo Gonzales, PTA 01/12/20 1138       User Key  (r) = Recorded By, (t) = Taken By, (c) = Cosigned By    Initials Name Effective Dates Discipline    TW Paolo Gonzales, PTA 03/07/18 -  PT    KD Pao Mejia TORRES/L 03/07/18 -  OT    TR Ariane Menendez RN 10/17/16 -  Nurse          Wound 01/09/20 0816 abdomen Incision (Active)   Dressing Appearance intact;dry 1/12/2020  8:52 AM   Closure GIGI 1/12/2020  8:52 AM   Base dressing in place, unable to visualize 1/12/2020  8:52 AM   Drainage  Amount none 1/12/2020  8:52 AM       Rehab Goal Summary     Row Name 01/12/20 1346 01/12/20 0920 01/12/20 0718       Physical Therapy Goals    Problem Specific Goal Selection (PT)  problem specific goal 2, PT;problem specific goal 1, PT  -TW  problem specific goal 2, PT;problem specific goal 1, PT  -TW  --    Additional Documentation  Problem Specific Goal Selection (PT) (Row)  -TW  Problem Specific Goal Selection (PT) (Row)  -TW  --       Bed Mobility Goal 1 (PT)    Activity/Assistive Device (Bed Mobility Goal 1, PT)  sit to supine;supine to sit  -TW  sit to supine;supine to sit  -TW  --    [place] Level/Cues Needed (Bed Mobility Goal 1, PT)  independent  -TW  independent  -TW  --    Time Frame (Bed Mobility Goal 1, PT)  by discharge  -TW  by discharge  -TW  --    Progress/Outcomes (Bed Mobility Goal 1, PT)  goal not met  -TW  goal not met  -TW  --       Transfer Goal 1 (PT)    Activity/Assistive Device (Transfer Goal 1, PT)  bed-to-chair/chair-to-bed  -TW  bed-to-chair/chair-to-bed  -TW  --    [place] Level/Cues Needed (Transfer Goal 1, PT)  independent;conditional independence  -TW  independent;conditional independence  -TW  --    Time Frame (Transfer Goal 1, PT)  by discharge  -TW  by discharge  -TW  --    Progress/Outcome (Transfer Goal 1, PT)  goal not met  -TW  goal not met  -TW  --       Gait Training Goal 1 (PT)    Activity/Assistive Device (Gait Training Goal 1, PT)  gait (walking locomotion);assistive device use;backward stepping  -TW  gait (walking locomotion);assistive device use;backward stepping  -TW  --    [place] Level (Gait Training Goal 1, PT)  independent  -TW  independent  -TW  --    Distance (Gait Goal 1, PT)  600 ftx1  -TW  600 ftx1  -TW  --    Time Frame (Gait Training Goal 1, PT)  by discharge  -TW  by discharge  -TW  --    Progress/Outcome (Gait Training Goal 1, PT)  goal not met  -TW  goal not met  -TW  --       Stairs Goal 1 (PT)    Activity/Assistive Device (Stairs Goal 1,  PT)  stairs, all skills;ascending stairs;descending stairs  -TW  stairs, all skills;ascending stairs;descending stairs  -TW  --    [place] Level/Cues Needed (Stairs Goal 1, PT)  independent  -TW  independent  -TW  --    Number of Stairs (Stairs Goal 1, PT)  3   -TW  3   -TW  --    Time Frame (Stairs Goal 1, PT)  by discharge  -TW  by discharge  -TW  --    Progress/Outcome (Stairs Goal 1, PT)  goal not met  -TW  goal not met  -TW  --       Problem Specific Goal 1 (PT)    Problem Specific Goal 1 (PT)  Patient will completed tinetti balance and gait with medically appropriate.  -TW  Patient will completed [place] balance and gait with medically appropriate.  -TW  --    Time Frame (Problem Specific Goal 1, PT)  2 days  -TW  2 days  -TW  --    Progress/Outcome (Problem Specific Goal 1, PT)  goal not met  -TW  goal not met  -TW  --       Problem Specific Goal 2 (PT)    Problem Specific Goal 2 (PT)  Patient will score >24/28 on tinetti to indicate low fall risk.  -TW  Patient will score >24/28 on tinetti to indicate low fall risk.  -TW  --    Time Frame (Problem Specific Goal 2, PT)  by discharge  -TW  by discharge  -TW  --    Progress/Outcome (Problem Specific Goal 2, PT)  goal not met  -TW  goal not met  -TW  --       Patient Education Goal (PT)    Activity (Patient Education Goal, PT)  Patient will understand the how to implent intentional walking program to improve CV endurance and strength  -TW  Patient will understand the how to implent intentional walking program to improve CV endurance and strength  -TW  --    [place]/Cues/Accuracy (Memory Goal 2, PT)  demonstrates adequately;verbalizes understanding  -TW  demonstrates adequately;verbalizes understanding  -TW  --    Time Frame (Patient Education Goal, PT)  by discharge  -TW  by discharge  -TW  --    Progress/Outcome (Patient Education Goal, PT)  goal not met  -TW  goal not met  -TW  --       Occupational Therapy Goals    Transfer Goal Selection (OT)  --   --  transfer, OT goal 1  -KD    Bathing Goal Selection (OT)  --  --  bathing, OT goal 1  -KD    Dressing Goal Selection (OT)  --  --  dressing, OT goal 1  -KD    Toileting Goal Selection (OT)  --  --  toileting, OT goal 1  -KD    Grooming Goal Selection (OT)  --  --  grooming, OT goal 1  -KD    Activity Tolerance Goal Selection (OT)  --  --  activity tolerance, OT goal 1  -KD       Transfer Goal 1 (OT)    Activity/Assistive Device (Transfer Goal 1, OT)  --  --  transfers, all  -KD    [place] Level/Cues Needed (Transfer Goal 1, OT)  --  --  verbal cues required;tactile cues required;set-up required;supervision required  -KD    Time Frame (Transfer Goal 1, OT)  --  --  long term goal (LTG);by discharge  -KD    Progress/Outcome (Transfer Goal 1, OT)  --  --  goal met  -KD       Bathing Goal 1 (OT)    Activity/Assistive Device (Bathing Goal 1, OT)  --  --  bathing skills, all  -KD    [place] Level/Cues Needed (Bathing Goal 1, OT)  --  --  standby assist;verbal cues required;tactile cues required;set-up required  -KD    Time Frame (Bathing Goal 1, OT)  --  --  long term goal (LTG);by discharge  -KD    Progress/Outcomes (Bathing Goal 1, OT)  --  --  goal not met  -KD       Dressing Goal 1 (OT)    Activity/Assistive Device (Dressing Goal 1, OT)  --  --  dressing skills, all  -KD    [place]/Cues Needed (Dressing Goal 1, OT)  --  --  standby assist;verbal cues required;tactile cues required;set-up required  -KD    Time Frame (Dressing Goal 1, OT)  --  --  long term goal (LTG);by discharge  -KD    Progress/Outcome (Dressing Goal 1, OT)  --  --  goal not met  -KD       Toileting Goal 1 (OT)    Activity/Device (Toileting Goal 1, OT)  --  --  toileting skills, all  -KD    [place] Level/Cues Needed (Toileting Goal 1, OT)  --  --  supervision required;verbal cues required;tactile cues required;set-up required  -KD    Time Frame (Toileting Goal 1, OT)  --  --  long term goal (LTG);by discharge  -KD     Progress/Outcome (Toileting Goal 1, OT)  --  --  goal met  -KD       Grooming Goal 1 (OT)    Activity/Device (Grooming Goal 1, OT)  --  --  grooming skills, all  -KD    [place] (Grooming Goal 1, OT)  --  --  supervision required;verbal cues required;tactile cues required;set-up required  -KD    Time Frame (Grooming Goal 1, OT)  --  --  long term goal (LTG);by discharge  -KD    Progress/Outcome (Grooming Goal 1, OT)  --  --  goal not met  -KD        Activity Tolerance Goal 1 (OT)    Activity Level (Endurance Goal 1, OT)  --  --  -- 20 min functional activity with 1-2 rest breaks   -KD    Time Frame (Activity Tolerance Goal 1, OT)  --  --  long term goal (LTG)  -KD    Progress/Outcome (Activity Tolerance Goal 1, OT)  --  --  goal not met  -KD      User Key  (r) = Recorded By, (t) = Taken By, (c) = Cosigned By    Initials Name Provider Type Discipline    TW Paolo Gonzales, PTA Physical Therapy Assistant PT    KD Pao Mejia, TORRES/L Occupational Therapy Assistant OT              PT Recommendation and Plan  Anticipated Discharge Disposition (PT): anticipate therapy at next level of care  Outcome Summary/Treatment Plan (PT)  Daily Summary of Progress (PT): progress toward functional goals is good  Plan for Continued Treatment (PT): Cont   Anticipated Discharge Disposition (PT): anticipate therapy at next level of care  Plan of Care Reviewed With: friend, spouse  Progress: improving  Outcome Summary: Pt able to t/f with sup sup to sit back to sup. Pt stood with SBA and amb without AD for 612ft including up/down 5 steps x 2 with sba of 1. Pt voiced no c/o during or after tx and although BP cont to be slightly elevated, pt was nonsymptomatic throughout tx. Pt would cont to benefit from therapy upon DC.  Outcome Measures     Row Name 01/12/20 0920 01/12/20 0718 01/11/20 1200       How much help from another person do you currently need...    Turning from your back to your side while in flat bed without using  bedrails?  4  -TW  --  --    Moving from lying on back to sitting on the side of a flat bed without bedrails?  4  -TW  --  --    Moving to and from a bed to a chair (including a wheelchair)?  3  -TW  --  --    Standing up from a chair using your arms (e.g., wheelchair, bedside chair)?  3  -TW  --  --    Climbing 3-5 steps with a railing?  3  -TW  --  --    To walk in hospital room?  3  -TW  --  --    AM-PAC 6 Clicks Score (PT)  20  -TW  --  --       How much help from another is currently needed...    Putting on and taking off regular lower body clothing?  --  2  -KD  2  -ALBERTO    Bathing (including washing, rinsing, and drying)  --  2  -KD  2  -ALBERTO    Toileting (which includes using toilet bed pan or urinal)  --  3  -KD  3  -ALBERTO    Putting on and taking off regular upper body clothing  --  3  -KD  3  -ALBERTO    Taking care of personal grooming (such as brushing teeth)  --  3  -KD  3  -ALBERTO    Eating meals  --  4  -KD  4  -ALBERTO    AM-PAC 6 Clicks Score (OT)  --  17  -KD  17  -ALBERTO       Functional Assessment    Outcome Measure Options  AM-PAC 6 Clicks Basic Mobility (PT)  -TW  --  AM-PAC 6 Clicks Daily Activity (OT)  -ALBERTO    Row Name 01/11/20 0933 01/10/20 1507 01/10/20 0937       How much help from another person do you currently need...    Turning from your back to your side while in flat bed without using bedrails?  3  -TW  3  -PAOLA  --    Moving from lying on back to sitting on the side of a flat bed without bedrails?  3  -TW  3  -PAOLA  --    Moving to and from a bed to a chair (including a wheelchair)?  3  -TW  3  -PAOLA  --    Standing up from a chair using your arms (e.g., wheelchair, bedside chair)?  3  -TW  3  -PAOLA  --    Climbing 3-5 steps with a railing?  2  -TW  2  -PAOLA  --    To walk in hospital room?  3  -TW  3  -PAOLA  --    AM-PAC 6 Clicks Score (PT)  17  -TW  17  -PAOLA  --       How much help from another is currently needed...    Putting on and taking off regular lower body clothing?  --  --  2  -JH    Bathing (including  washing, rinsing, and drying)  --  --  2  -JH    Toileting (which includes using toilet bed pan or urinal)  --  --  2  -JH    Putting on and taking off regular upper body clothing  --  --  3  -JH    Taking care of personal grooming (such as brushing teeth)  --  --  3  -JH    Eating meals  --  --  4  -JH    AM-PAC 6 Clicks Score (OT)  --  --  16  -       Functional Assessment    Outcome Measure Options  AM-PAC 6 Clicks Basic Mobility (PT)  -TW  AM-PAC 6 Clicks Basic Mobility (PT)  -PAOLA  AM-PAC 6 Clicks Daily Activity (OT)  -      User Key  (r) = Recorded By, (t) = Taken By, (c) = Cosigned By    Initials Name Provider Type    Timothy Rebolledo PTA Physical Therapy Assistant    TW Paolo Gonzales PTA Physical Therapy Assistant    Pao Pelayo, MELISSA/L Occupational Therapy Assistant     Brent Marte OT Occupational Therapist    La Nena Hoang OTA Occupational Therapy Assistant         Time Calculation:   PT Charges     Row Name 01/12/20 1452 01/12/20 1142          Time Calculation    Start Time  1346  -TW  0920  -TW     Stop Time  1426  -TW  0959  -TW     Time Calculation (min)  40 min  -TW  39 min  -TW     PT Received On  01/12/20  -TW  01/12/20  -TW     PT Goal Re-Cert Due Date  01/23/20  -TW  01/23/20  -TW        Time Calculation- PT    Total Timed Code Minutes- PT  40 minute(s)  -TW  39 minute(s)  -TW       User Key  (r) = Recorded By, (t) = Taken By, (c) = Cosigned By    Initials Name Provider Type    Paolo Samaniego PTA Physical Therapy Assistant        Therapy Charges for Today     Code Description Service Date Service Provider Modifiers Qty    76631436492 HC GAIT TRAINING EA 15 MIN 1/11/2020 Paolo Gonzales, PTA GP 1    89738742666 HC PT THER SUPP EA 15 MIN 1/11/2020 Paolo Gonzales, PTA GP 1    25303903140 HC GAIT TRAINING EA 15 MIN 1/12/2020 Paolo Gonzales, PTA GP 1    35051831017 HC PT THERAPEUTIC ACT EA 15 MIN 1/12/2020 Paolo Gonzales, PTA GP 1    [id number]  HC PT THER PROC EA 15 MIN 1/12/2020 Paolo Gonzales, PTA GP 1    85683912137 HC GAIT TRAINING EA 15 MIN 1/12/2020 Paolo Gonzales, PTA GP 1    75025186089 HC PT THERAPEUTIC ACT EA 15 MIN 1/12/2020 Paolo Gonzales, PTA GP 1    25438270918 HC PT THER PROC EA 15 MIN 1/12/2020 Paolo Gonzales, PTA GP 1          PT G-Codes  Outcome Measure Options: AM-PAC 6 Clicks Basic Mobility (PT)  AM-PAC 6 Clicks Score (PT): 20  AM-PAC 6 Clicks Score (OT): 17    Paolo Gonzales PTA  1/12/2020

## 2020-01-12 NOTE — PROGRESS NOTES
GENERAL SURGERY PROGRESS NOTE  Chief Complaint:  Surgery Follow up   LOS: 3 days       Subjective     Interval History:     Had BM this AM. Concerned about high BP. Tolerating diet.    Objective     Vital Signs  Temp:  [96.7 °F (35.9 °C)-97.9 °F (36.6 °C)] 97.9 °F (36.6 °C)  Heart Rate:  [75-88] 84  Resp:  [16-18] 18  BP: (152-170)/(72-86) 170/86    Physical Exam:   Prevena in place  Labs:  Lab Results (last 24 hours)     ** No results found for the last 24 hours. **           Results Review:     Labs and imaging for today were reviewed.    Assessment/Plan     Sharon Esposito is a 74 y.o. female who is s/p colon resection.      DC IVF and PCA.  Regular diet  Add home BP meds  Anticipate home tomorrow.          This document has been electronically signed by Jacobo Zurita MD on January 12, 2020 9:14 AM        Jacobo Zurita MD  01/12/20  9:14 AM

## 2020-01-12 NOTE — THERAPY TREATMENT NOTE
Acute Care - Physical Therapy Treatment Note  [facility]     Patient Name: Sharon Esposito  : 1945  MRN: [id number]  Today's Date: 2020  Onset of Illness/Injury or Date of Surgery: 20     Referring Physician: ELLIE Mcneill MD    Admit Date: 2020    Visit Dx:    ICD-10-CM ICD-9-CM   1. Adenomatous polyp D36.9 229.9   2. Decreased functional mobility R26.89 781.99   3. Impaired mobility and activities of daily living Z74.09 799.89     Patient Active Problem List   Diagnosis   • Need for hepatitis C screening test   • Hyperlipidemia   • Acquired hypothyroidism   • Seasonal allergies   • Osteopenia   • Essential hypertension   • Edema   • Diverticulitis of colon   • Allergic rhinitis   • Postmenopausal   • Ingrown toenail   • Paresthesia of both feet   • Type 2 diabetes mellitus without complication, without long-term current use of insulin (CMS/HCC)   • Cobalamin deficiency   • Diabetic peripheral neuropathy (CMS/HCC)   • Encounter for screening for malignant neoplasm of colon   • Villous adenoma of colon 2019   • Adenomatous polyp       Therapy Treatment    Rehabilitation Treatment Summary     Row Name 20 0920             Treatment Time/Intention    Discipline  physical therapy assistant  -TW      Document Type  therapy note (daily note)  -TW      Subjective Information  no complaints  -TW      Mode of Treatment  physical therapy;individual therapy  -TW      Patient/Family Observations  Pt up in chair with no familypresent.  -TW      Patient Effort  good  -TW      Existing Precautions/Restrictions  fall Pt no longer on O2.   -TW      Recorded by [TW] Paolo Gonzales PTA 20 1138      Row Name 20 0920             Vital Signs    Pre Systolic BP Rehab  212  -TW      Pre Treatment Diastolic BP  94  -TW      Post Systolic BP Rehab  206  -TW      Post Treatment Diastolic BP  88  -TW      Pretreatment Heart Rate (beats/min)  84  -TW      Posttreatment Heart Rate  (beats/min)  80  -TW      Pre SpO2 (%)  94  -TW      O2 Delivery Pre Treatment  room air  -TW      Intra SpO2 (%)  95  -TW      Post SpO2 (%)  96  -TW      O2 Delivery Post Treatment  room air  -TW      Pre Patient Position  Sitting  -TW      Intra Patient Position  Standing  -TW      Post Patient Position  Sitting  -TW      Recorded by [TW] Paolo Gonzales, PTA 01/12/20 1138      Row Name 01/12/20 0920             Cognitive Assessment/Intervention- PT/OT    Affect/Mental Status (Cognitive)  WFL  -TW      Orientation Status (Cognition)  oriented x 4  -TW      Follows Commands (Cognition)  WFL  -TW      Cognitive Function (Cognitive)  WFL  -TW      Personal Safety Interventions  fall prevention program maintained;gait belt;nonskid shoes/slippers when out of bed  -TW      Recorded by [TW] Paolo Gonzales, PTA 01/12/20 1138      Row Name 01/12/20 0920             Safety Issues, Functional Mobility    Impairments Affecting Function (Mobility)  coordination;endurance/activity tolerance;pain;strength  -TW      Recorded by [TW] Paolo Gonzales PTA 01/12/20 1138      Row Name 01/12/20 0920             Bed Mobility Assessment/Treatment    Comment (Bed Mobility)  Pt up in chair upon arrival and returned to chair after tx.  -TW      Recorded by [TW] Paolo Gonzales PTA 01/12/20 1138      Row Name 01/12/20 0920             Transfer Assessment/Treatment    Transfer Assessment/Treatment  sit-stand transfer;stand-sit transfer  -TW      Recorded by [TW] Paolo Gonzales PTA 01/12/20 1138      Row Name 01/12/20 0920             Sit-Stand Transfer    Sit-Stand [place] (Transfers)  stand by assist  -TW      Assistive Device (Sit-Stand Transfers)  walker, front-wheeled  -TW      Recorded by [TW] Paolo Gonzales PTA 01/12/20 1138      Row Name 01/12/20 0920             Stand-Sit Transfer    Stand-Sit [place] (Transfers)  stand by assist  -TW      Assistive Device (Stand-Sit Transfers)  walker,  front-wheeled  -TW      Recorded by [TW] Paolo Gonzales PTA 01/12/20 1138      Row Name 01/12/20 0920             Gait/Stairs Assessment/Training    Gait/Stairs Assessment/Training  gait/ambulation assistive device  -TW      [place] Level (Gait)  stand by assist;contact guard  -TW      Assistive Device (Gait)  walker, front-wheeled  -TW      Distance in Feet (Gait)  600ft  -TW      Deviations/Abnormal Patterns (Gait)  armando decreased  -TW      [place] Level (Stairs)  stand by assist  -TW      Handrail Location (Stairs)  right side (ascending)  -TW      Number of Steps (Stairs)  5x2  -TW      Ascending Technique (Stairs)  step-over-step  -TW      Descending Technique (Stairs)  step-over-step  -TW      Recorded by [TW] Paolo Gonzales PTA 01/12/20 1138      Row Name 01/12/20 0920             Therapeutic Exercise    Lower Extremity (Therapeutic Exercise)  LAQ (long arc quad), bilateral;marching while seated  -TW      Lower Extremity Range of Motion (Therapeutic Exercise)  hip abduction/adduction, bilateral;ankle dorsiflexion/plantar flexion, bilateral  -TW      Exercise Type (Therapeutic Exercise)  AROM (active range of motion)  -TW      Position (Therapeutic Exercise)  seated  -TW      Sets/Reps (Therapeutic Exercise)  2/15  -TW      Recorded by [TW] Paolo Gonzales PTA 01/12/20 1138      Row Name 01/12/20 0920             Positioning and Restraints    Pre-Treatment Position  sitting in chair/recliner  -TW      Post Treatment Position  chair  -TW      In Chair  sitting;call light within reach;encouraged to call for assist;exit alarm on  -TW      Recorded by [TW] Paolo Gonzales PTA 01/12/20 1138      Row Name 01/12/20 0920             Pain Scale: Numbers Pre/Post-Treatment    Pain Scale: Numbers, Pretreatment  0/10 - no pain  -TW      Pain Scale: Numbers, Post-Treatment  0/10 - no pain  -TW      Recorded by [TW] Paolo Gonzales PTA 01/12/20 1138      Row Name                Wound  01/09/20 0816 abdomen Incision    Wound - Properties Group Date first assessed: 01/09/20 [TR] Time first assessed: 0816 [TR] Present on Hospital Admission: N [TR] Location: abdomen [TR] Primary Wound Type: Incision [TR] Recorded by:  [TR] Ariane Menendez RN 01/09/20 0832    Row Name 01/12/20 0920             Outcome Summary/Treatment Plan (PT)    Daily Summary of Progress (PT)  progress toward functional goals as expected  -TW      Plan for Continued Treatment (PT)  Cont  -TW      Anticipated Discharge Disposition (PT)  anticipate therapy at next level of care  -TW      Recorded by [TW] Paolo Gonzales PTA 01/12/20 1138        User Key  (r) = Recorded By, (t) = Taken By, (c) = Cosigned By    Initials Name Effective Dates Discipline    TW Paolo Gonzales PTA 03/07/18 -  PT    TR Ariane Menendez RN 10/17/16 -  Nurse          Wound 01/09/20 0816 abdomen Incision (Active)   Dressing Appearance intact;dry 1/12/2020  8:52 AM   Closure GIGI 1/12/2020  8:52 AM   Base dressing in place, unable to visualize 1/12/2020  8:52 AM   Drainage Amount none 1/12/2020  8:52 AM       Rehab Goal Summary     Row Name 01/12/20 0920             Physical Therapy Goals    Problem Specific Goal Selection (PT)  problem specific goal 2, PT;problem specific goal 1, PT  -TW      Additional Documentation  Problem Specific Goal Selection (PT) (Row)  -TW         Bed Mobility Goal 1 (PT)    Activity/Assistive Device (Bed Mobility Goal 1, PT)  sit to supine;supine to sit  -TW      [place] Level/Cues Needed (Bed Mobility Goal 1, PT)  independent  -TW      Time Frame (Bed Mobility Goal 1, PT)  by discharge  -TW      Progress/Outcomes (Bed Mobility Goal 1, PT)  goal not met  -TW         Transfer Goal 1 (PT)    Activity/Assistive Device (Transfer Goal 1, PT)  bed-to-chair/chair-to-bed  -TW      [place] Level/Cues Needed (Transfer Goal 1, PT)  independent;conditional independence  -TW      Time Frame (Transfer Goal 1, PT)  by discharge   -TW      Progress/Outcome (Transfer Goal 1, PT)  goal not met  -TW         Gait Training Goal 1 (PT)    Activity/Assistive Device (Gait Training Goal 1, PT)  gait (walking locomotion);assistive device use;backward stepping  -TW      [place] Level (Gait Training Goal 1, PT)  independent  -TW      Distance (Gait Goal 1, PT)  600 ftx1  -TW      Time Frame (Gait Training Goal 1, PT)  by discharge  -TW      Progress/Outcome (Gait Training Goal 1, PT)  goal not met  -TW         Stairs Goal 1 (PT)    Activity/Assistive Device (Stairs Goal 1, PT)  stairs, all skills;ascending stairs;descending stairs  -TW      [place] Level/Cues Needed (Stairs Goal 1, PT)  independent  -TW      Number of Stairs (Stairs Goal 1, PT)  3   -TW      Time Frame (Stairs Goal 1, PT)  by discharge  -TW      Progress/Outcome (Stairs Goal 1, PT)  goal not met  -TW         Problem Specific Goal 1 (PT)    Problem Specific Goal 1 (PT)  Patient will completed tinetti balance and gait with medically appropriate.  -TW      Time Frame (Problem Specific Goal 1, PT)  2 days  -TW      Progress/Outcome (Problem Specific Goal 1, PT)  goal not met  -TW         Problem Specific Goal 2 (PT)    Problem Specific Goal 2 (PT)  Patient will score >24/28 on tinetti to indicate low fall risk.  -TW      Time Frame (Problem Specific Goal 2, PT)  by discharge  -TW      Progress/Outcome (Problem Specific Goal 2, PT)  goal not met  -TW         Patient Education Goal (PT)    Activity (Patient Education Goal, PT)  Patient will understand the how to implent intentional walking program to improve CV endurance and strength  -TW      [place]/Cues/Accuracy (Memory Goal 2, PT)  demonstrates adequately;verbalizes understanding  -TW      Time Frame (Patient Education Goal, PT)  by discharge  -TW      Progress/Outcome (Patient Education Goal, PT)  goal not met  -TW        User Key  (r) = Recorded By, (t) = Taken By, (c) = Cosigned By    Initials Name Provider Type  Discipline    TW Paolo Gonzales, PTA Physical Therapy Assistant PT              PT Recommendation and Plan  Anticipated Discharge Disposition (PT): anticipate therapy at next level of care  Outcome Summary/Treatment Plan (PT)  Daily Summary of Progress (PT): progress toward functional goals as expected  Plan for Continued Treatment (PT): Cont  Anticipated Discharge Disposition (PT): anticipate therapy at next level of care  Plan of Care Reviewed With: patient  Progress: improving  Outcome Summary: Pt up in chair upon arrival and agreeable to therapy. Pt with slightly elevated BP and nsg aware and okayed tx. Pt able to stand with SBA andamb with RW for 600ft including up/down 5 steps x 2 with SBA/supervision. Pt with no LOB noted throughout. Pt does report SOA after gait but sats within good level (95%). Pt tolerated B LE seated therex for 2 sets x 15 reps each. Pt would cont to benefit from therapy upon DC.  Outcome Measures     Row Name 01/12/20 0920 01/11/20 1200 01/11/20 0933       How much help from another person do you currently need...    Turning from your back to your side while in flat bed without using bedrails?  4  -TW  --  3  -TW    Moving from lying on back to sitting on the side of a flat bed without bedrails?  4  -TW  --  3  -TW    Moving to and from a bed to a chair (including a wheelchair)?  3  -TW  --  3  -TW    Standing up from a chair using your arms (e.g., wheelchair, bedside chair)?  3  -TW  --  3  -TW    Climbing 3-5 steps with a railing?  3  -TW  --  2  -TW    To walk in hospital room?  3  -TW  --  3  -TW    AM-PAC 6 Clicks Score (PT)  20  -TW  --  17  -TW       How much help from another is currently needed...    Putting on and taking off regular lower body clothing?  --  2  -ALBERTO  --    Bathing (including washing, rinsing, and drying)  --  2  -ALBERTO  --    Toileting (which includes using toilet bed pan or urinal)  --  3  -ALBERTO  --    Putting on and taking off regular upper body clothing  --   3  -ALBERTO  --    Taking care of personal grooming (such as brushing teeth)  --  3  -ALBERTO  --    Eating meals  --  4  -ALBERTO  --    AM-PAC 6 Clicks Score (OT)  --  17  -ALBERTO  --       Functional Assessment    Outcome Measure Options  AM-PAC 6 Clicks Basic Mobility (PT)  -  AM-PAC 6 Clicks Daily Activity (OT)  -ALBERTO  AM-PAC 6 Clicks Basic Mobility (PT)  -    Row Name 01/10/20 1507 01/10/20 0937          How much help from another person do you currently need...    Turning from your back to your side while in flat bed without using bedrails?  3  -PAOLA  --     Moving from lying on back to sitting on the side of a flat bed without bedrails?  3  -PAOLA  --     Moving to and from a bed to a chair (including a wheelchair)?  3  -PAOLA  --     Standing up from a chair using your arms (e.g., wheelchair, bedside chair)?  3  -PAOLA  --     Climbing 3-5 steps with a railing?  2  -PAOLA  --     To walk in hospital room?  3  -PAOLA  --     AM-PAC 6 Clicks Score (PT)  17  -PAOLA  --        How much help from another is currently needed...    Putting on and taking off regular lower body clothing?  --  2  -JH     Bathing (including washing, rinsing, and drying)  --  2  -JH     Toileting (which includes using toilet bed pan or urinal)  --  2  -JH     Putting on and taking off regular upper body clothing  --  3  -JH     Taking care of personal grooming (such as brushing teeth)  --  3  -JH     Eating meals  --  4  -JH     AM-PAC 6 Clicks Score (OT)  --  16  -        Functional Assessment    Outcome Measure Options  AM-PAC 6 Clicks Basic Mobility (PT)  -  AM-PAC 6 Clicks Daily Activity (OT)  -       User Key  (r) = Recorded By, (t) = Taken By, (c) = Cosigned By    Initials Name Provider Type    Timothy Rebolledo PTA Physical Therapy Assistant    Paolo Samaniego PTA Physical Therapy Assistant    Brent Al, OT Occupational Therapist    La Nena Hoang OTA Occupational Therapy Assistant         Time Calculation:   PT Charges     Row Name  01/12/20 1142             Time Calculation    Start Time  0920  -TW      Stop Time  0959  -TW      Time Calculation (min)  39 min  -TW      PT Received On  01/12/20  -TW      PT Goal Re-Cert Due Date  01/23/20  -TW         Time Calculation- PT    Total Timed Code Minutes- PT  39 minute(s)  -TW        User Key  (r) = Recorded By, (t) = Taken By, (c) = Cosigned By    Initials Name Provider Type     Paolo Gonzales PTA Physical Therapy Assistant        Therapy Charges for Today     Code Description Service Date Service Provider Modifiers Qty    17485392816 HC GAIT TRAINING EA 15 MIN 1/11/2020 Paolo Gonzales, BRYCE GP 1    50787530557 HC PT THER SUPP EA 15 MIN 1/11/2020 Paolo Gonzales, BRYCE GP 1    20597607397 HC GAIT TRAINING EA 15 MIN 1/12/2020 Paolo Gonzales, BRYCE GP 1    23394897824 HC PT THERAPEUTIC ACT EA 15 MIN 1/12/2020 Paolo Gonzales, BRYCE GP 1    41851043721 HC PT THER PROC EA 15 MIN 1/12/2020 Paolo Gonzales, BRYCE GP 1          PT G-Codes  Outcome Measure Options: AM-PAC 6 Clicks Basic Mobility (PT)  AM-PAC 6 Clicks Score (PT): 20  AM-PAC 6 Clicks Score (OT): 17    Paolo Gonzales PTA  1/12/2020

## 2020-01-12 NOTE — PLAN OF CARE
Problem: Patient Care Overview  Goal: Plan of Care Review  Outcome: Ongoing (interventions implemented as appropriate)  Flowsheets (Taken 1/12/2020 0920)  Progress: improving  Plan of Care Reviewed With: patient  Outcome Summary: Pt up in chair upon arrival and agreeable to therapy. Pt with slightly elevated BP and nsg aware and okayed tx. Pt able to stand with SBA andamb with RW for 600ft including up/down 5 steps x 2 with SBA/supervision. Pt with no LOB noted throughout. Pt does report SOA after gait but sats within good level (95%). Pt tolerated B LE seated therex for 2 sets x 15 reps each. Pt would cont to benefit from therapy upon DC.

## 2020-01-13 VITALS
RESPIRATION RATE: 18 BRPM | OXYGEN SATURATION: 92 % | HEART RATE: 75 BPM | TEMPERATURE: 96.5 F | DIASTOLIC BLOOD PRESSURE: 80 MMHG | WEIGHT: 211.64 LBS | HEIGHT: 64 IN | BODY MASS INDEX: 36.13 KG/M2 | SYSTOLIC BLOOD PRESSURE: 185 MMHG

## 2020-01-13 LAB
LAB AP CASE REPORT: NORMAL
PATH REPORT.FINAL DX SPEC: NORMAL
PATH REPORT.GROSS SPEC: NORMAL

## 2020-01-13 PROCEDURE — 97110 THERAPEUTIC EXERCISES: CPT

## 2020-01-13 PROCEDURE — 97535 SELF CARE MNGMENT TRAINING: CPT

## 2020-01-13 PROCEDURE — 25010000002 ENOXAPARIN PER 10 MG: Performed by: SURGERY

## 2020-01-13 RX ADMIN — FAMOTIDINE 20 MG: 10 INJECTION INTRAVENOUS at 08:01

## 2020-01-13 RX ADMIN — LOSARTAN POTASSIUM: 50 TABLET, FILM COATED ORAL at 08:02

## 2020-01-13 RX ADMIN — METOPROLOL TARTRATE 12.5 MG: 25 TABLET, FILM COATED ORAL at 08:02

## 2020-01-13 RX ADMIN — ENOXAPARIN SODIUM 40 MG: 40 INJECTION SUBCUTANEOUS at 08:01

## 2020-01-13 NOTE — THERAPY TREATMENT NOTE
Acute Care - Physical Therapy Treatment Note  [facility]     Patient Name: Sharon Esposito  : 1945  MRN: [id number]  Today's Date: 2020  Onset of Illness/Injury or Date of Surgery: 20     Referring Physician: ELLIE Mcneill MD    Admit Date: 2020    Visit Dx:    ICD-10-CM ICD-9-CM   1. Adenomatous polyp D36.9 229.9   2. Decreased functional mobility R26.89 781.99   3. Impaired mobility and activities of daily living Z74.09 799.89     Patient Active Problem List   Diagnosis   • Need for hepatitis C screening test   • Hyperlipidemia   • Acquired hypothyroidism   • Seasonal allergies   • Osteopenia   • Essential hypertension   • Edema   • Diverticulitis of colon   • Allergic rhinitis   • Postmenopausal   • Ingrown toenail   • Paresthesia of both feet   • Type 2 diabetes mellitus without complication, without long-term current use of insulin (CMS/HCC)   • Cobalamin deficiency   • Diabetic peripheral neuropathy (CMS/HCC)   • Encounter for screening for malignant neoplasm of colon   • Villous adenoma of colon 2019   • Adenomatous polyp       Therapy Treatment    Rehabilitation Treatment Summary     Row Name 20 1002 20 0830          Treatment Time/Intention    Discipline  physical therapy assistant  -PAOLA  occupational therapy assistant  -CS     Document Type  therapy note (daily note)  -PAOLA  therapy note (daily note)  -CS     Subjective Information  --  no complaints  -CS     Mode of Treatment  physical therapy;individual therapy  -PAOLA  occupational therapy  -CS     Therapy Frequency (OT Eval)  --  daily  -CS     Patient Effort  good  -PAOLA  excellent  -CS     Existing Precautions/Restrictions  fall Pt no longer on O2.   -PAOLA  fall  -CS     Recorded by [PAOLA] Timothy Doe, BRYCE 20 1011 [CS] Jolene Bermudez COTA/L 20 1244     Row Name 20 1002 20 0830          Vital Signs    Pre Systolic BP Rehab  198 nsg notified. no mobility performed at this time.   -PAOLA   --     Pre Treatment Diastolic BP  82  -JC2  --     Post Systolic BP Rehab  190  -JC2  --     Post Treatment Diastolic BP  86  -JC2  --     Pretreatment Heart Rate (beats/min)  84  -JC2  76  -CS     Posttreatment Heart Rate (beats/min)  76  -JC2  --     Pre SpO2 (%)  96  -JC2  95  -CS     O2 Delivery Pre Treatment  room air  -JC2  room air  -CS     Post SpO2 (%)  97  -JC2  --     O2 Delivery Post Treatment  room air  -JC2  --     Pre Patient Position  Standing  -PAOLA  Sitting  -CS     Intra Patient Position  --  Standing  -CS     Post Patient Position  Sitting  -JC2  Sitting  -CS     Recorded by [PAOLA] Timothy Doe, PTA 01/13/20 1201  [JC2] Timothy Doe, PTA 01/13/20 1044 [CS] Jolene Bermudez COTA/L 01/13/20 1244     Row Name 01/13/20 1002 01/13/20 0830          Cognitive Assessment/Intervention- PT/OT    Affect/Mental Status (Cognitive)  WFL  -PAOLA  WFL  -CS     Orientation Status (Cognition)  oriented x 4  -PAOLA  oriented x 4  -CS     Follows Commands (Cognition)  WFL  -PAOLA  WFL  -CS     Cognitive Function (Cognitive)  WFL  -PAOLA  WFL  -CS     Personal Safety Interventions  fall prevention program maintained;gait belt;muscle strengthening facilitated;nonskid shoes/slippers when out of bed;supervised activity  -JC2  --     Recorded by [PAOLA] Timothy oDe, PTA 01/13/20 1011  [JC2] Timothy Doe, PTA 01/13/20 1201 [CS] Jolene Bermudez COTA/L 01/13/20 1244     Row Name 01/13/20 1002             Safety Issues, Functional Mobility    Impairments Affecting Function (Mobility)  --  -PAOLA      Recorded by [PAOLA] Timothy Doe, PTA 01/13/20 1243      Row Name 01/13/20 1002             Bed Mobility Assessment/Treatment    Supine-Sit-Supine [place] (Bed Mobility)  --  -PAOLA      Assistive Device (Bed Mobility)  --  -PAOLA      Recorded by [PAOLA] Timothy Doe, PTA 01/13/20 1243      Row Name 01/13/20 1002 01/13/20 0830          Transfer Assessment/Treatment    Transfer Assessment/Treatment  stand-sit transfer  -PAOLA   sit-stand transfer;stand-sit transfer;toilet transfer  -CS     Recorded by [PAOLA] Timothy Doe, PTA 01/13/20 1243 [CS] Jolene Bermudez COTA/L 01/13/20 1244     Row Name 01/13/20 1002 01/13/20 0830          Sit-Stand Transfer    Sit-Stand [place] (Transfers)  --  -PAOLA  independent  -CS     Assistive Device (Sit-Stand Transfers)  --  -PAOLA  other (see comments) none  -CS     Recorded by [PAOLA] Timothy Doe, PTA 01/13/20 1243 [CS] Jolene Bermudez COTA/L 01/13/20 1244     Row Name 01/13/20 1002 01/13/20 0830          Stand-Sit Transfer    Stand-Sit [place] (Transfers)  independent  -PAOLA  independent  -CS     Assistive Device (Stand-Sit Transfers)  --  -PAOLA  other (see comments) none  -CS     Recorded by [PAOLA] Timothy Doe, PTA 01/13/20 1243 [CS] Jolene Bermudez TORRES/L 01/13/20 1244     Row Name 01/13/20 0830             Toilet Transfer    Type (Toilet Transfer)  sit-stand;stand-sit  -CS      [place] Level (Toilet Transfer)  supervision  -CS      Assistive Device (Toilet Transfer)  commode  -CS      Recorded by [CS] Jolene Bermudez COTA/L 01/13/20 1244      Row Name 01/13/20 1002             Gait/Stairs Assessment/Training    Gait/Stairs Assessment/Training  --  -PAOLA      [place] Level (Gait)  --  -PAOLA      Assistive Device (Gait)  --  -PAOLA      Deviations/Abnormal Patterns (Gait)  --  -PAOLA      [place] Level (Stairs)  --  -PAOLA      Handrail Location (Stairs)  --  -PAOLA      Ascending Technique (Stairs)  --  -PAOLA      Descending Technique (Stairs)  --  -PAOLA      Recorded by [PAOLA] Timothy Doe, PTA 01/13/20 1243      Row Name 01/13/20 0830             ADL Assessment/Intervention    BADL Assessment/Intervention  upper body dressing;lower body dressing;grooming;toileting  -CS      Recorded by [CS] Jolene Bermudez COTA/L 01/13/20 1244      Row Name 01/13/20 0830             Upper Body Dressing Assessment/Training    Upper Body Dressing [place] Level  don;bra/undergarment;front opening  garment;independent  -CS      Upper Body Dressing Position  unsupported standing  -CS      Recorded by [CS] Jolene Bermudez COTA/L 01/13/20 1244      Row Name 01/13/20 0830             Lower Body Dressing Assessment/Training    Lower Body Dressing [place] Level  don;pants/bottoms;shoes/slippers;socks;undergarment;supervision  -CS      Lower Body Dressing Position  unsupported sitting;unsupported standing  -CS      Recorded by [CS] Jolene Bermudez COTA/L 01/13/20 1244      Row Name 01/13/20 0830             Grooming Assessment/Training    [place] Level (Grooming)  grooming skills;wash face, hands;independent  -CS      Grooming Position  unsupported standing  -CS      Recorded by [CS] Jolene Bermudez COTA/L 01/13/20 1244      Row Name 01/13/20 0830             Toileting Assessment/Training    [place] Level (Toileting)  toileting skills;adjust/manage clothing;perform perineal hygiene;supervision  -CS      Assistive Devices (Toileting)  commode  -CS      Toileting Position  unsupported sitting;unsupported standing  -CS      Recorded by [CS] Jolene Bermudez COTA/L 01/13/20 1244      Row Name 01/13/20 0830             Motor Skills Assessment/Interventions    Additional Documentation  Therapeutic Exercise (Group)  -CS      Recorded by [CS] Jolene Bermudez COTA/L 01/13/20 1244      Row Name 01/13/20 0830             Therapeutic Exercise    Upper Extremity (Therapeutic Exercise)  bicep curl, bilateral  -CS      Upper Extremity Range of Motion (Therapeutic Exercise)  shoulder flexion/extension, bilateral;shoulder abduction/adduction, bilateral;shoulder horizontal abduction/adduction, bilateral;elbow flexion/extension, bilateral  -CS      Weight/Resistance (Therapeutic Exercise)  red  -CS      Exercise Type (Therapeutic Exercise)  AROM (active range of motion)  -CS      Position (Therapeutic Exercise)  seated  -CS      Sets/Reps (Therapeutic Exercise)  20  -CS      Equipment (Therapeutic Exercise)   resistive bands  -CS      Expected Outcome (Therapeutic Exercise)  improve functional tolerance, self-care activity;improve performance, transfer skills  -CS      Recorded by [CS] Jolene Bermudez COTA/L 01/13/20 1244      Row Name 01/13/20 1002 01/13/20 0830          Positioning and Restraints    Pre-Treatment Position  standing in room  -PAOLA  sitting in chair/recliner  -CS     Post Treatment Position  bed  -PAOLA  bed  -CS     In Bed  sitting EOB;encouraged to call for assist;with family/caregiver  -PAOLA  sitting EOB;call light within reach;encouraged to call for assist  -CS     Recorded by [PAOLA] Timothy Doe, PTA 01/13/20 1243 [CS] Jolene Bermudez COTA/L 01/13/20 1244     Row Name 01/13/20 1002 01/13/20 0830          Pain Scale: Numbers Pre/Post-Treatment    Pain Scale: Numbers, Pretreatment  0/10 - no pain  -PAOLA  0/10 - no pain  -CS     Pain Scale: Numbers, Post-Treatment  0/10 - no pain  -PAOLA  0/10 - no pain  -CS     Recorded by [PAOLA] iTmothy Doe, PTA 01/13/20 1011 [CS] Jolene Bermudez TORRES/L 01/13/20 1244     Row Name                Wound 01/09/20 0816 abdomen Incision    Wound - Properties Group Date first assessed: 01/09/20 [TR] Time first assessed: 0816 [TR] Present on Hospital Admission: N [TR] Location: abdomen [TR] Primary Wound Type: Incision [TR] Recorded by:  [TR] Ariane Menendez RN 01/09/20 0832    Row Name 01/13/20 0830             Outcome Summary/Treatment Plan (OT)    Daily Summary of Progress (OT)  progress toward functional goals as expected  -CS      Plan for Continued Treatment (OT)  cont OT POC  -CS      Anticipated Discharge Disposition (OT)  anticipate therapy at next level of care  -CS      Recorded by [CS] Jolene Bermudez TORRES/L 01/13/20 1244      Row Name 01/13/20 1002             Outcome Summary/Treatment Plan (PT)    Daily Summary of Progress (PT)  progress toward functional goals as expected  -PAOLA      Plan for Continued Treatment (PT)  continue  -PAOLA      Anticipated  Discharge Disposition (PT)  anticipate therapy at next level of care  -JC2      Recorded by [PAOLA] Nader Timothy R, PTA 01/13/20 1243  [JC2] Timothy Doe, PTA 01/13/20 1011        User Key  (r) = Recorded By, (t) = Taken By, (c) = Cosigned By    Initials Name Effective Dates Discipline    PAOLA Timothy Doe, PTA 03/07/18 -  PT    CS Jolene Bermudez, TORRES/L 03/07/18 -  OT    Ariane Vernon RN 10/17/16 -  Nurse          Wound 01/09/20 0816 abdomen Incision (Active)   Dressing Appearance dry;intact 1/13/2020  8:04 AM   Closure Open to air;[place] 1/13/2020  8:04 AM   Base dressing in place, unable to visualize 1/12/2020  8:10 PM   Drainage Amount none 1/12/2020  8:10 PM       Rehab Goal Summary     Row Name 01/13/20 1002 01/13/20 0830          Physical Therapy Goals    Problem Specific Goal Selection (PT)  problem specific goal 2, PT;problem specific goal 1, PT  -PAOLA  --     Additional Documentation  Problem Specific Goal Selection (PT) (Row)  -  --        Bed Mobility Goal 1 (PT)    Activity/Assistive Device (Bed Mobility Goal 1, PT)  sit to supine;supine to sit  -PAOLA  --     [place] Level/Cues Needed (Bed Mobility Goal 1, PT)  independent  -PAOLA  --     Time Frame (Bed Mobility Goal 1, PT)  by discharge  -PAOLA  --     Progress/Outcomes (Bed Mobility Goal 1, PT)  goal not met  -PAOLA  --        Transfer Goal 1 (PT)    Activity/Assistive Device (Transfer Goal 1, PT)  bed-to-chair/chair-to-bed  -PAOLA  --     [place] Level/Cues Needed (Transfer Goal 1, PT)  independent;conditional independence  -PAOLA  --     Time Frame (Transfer Goal 1, PT)  by discharge  -PAOLA  --     Progress/Outcome (Transfer Goal 1, PT)  goal not met  -PAOLA  --        Gait Training Goal 1 (PT)    Activity/Assistive Device (Gait Training Goal 1, PT)  gait (walking locomotion);assistive device use;backward stepping  -  --     [place] Level (Gait Training Goal 1, PT)  independent  -PAOLA  --     Distance (Gait Goal 1, PT)  600 ftx1  -PAOLA  --      Time Frame (Gait Training Goal 1, PT)  by discharge  -PAOLA  --     Progress/Outcome (Gait Training Goal 1, PT)  goal not met  -PAOLA  --        Stairs Goal 1 (PT)    Activity/Assistive Device (Stairs Goal 1, PT)  stairs, all skills;ascending stairs;descending stairs  -PAOLA  --     [place] Level/Cues Needed (Stairs Goal 1, PT)  independent  -PAOLA  --     Number of Stairs (Stairs Goal 1, PT)  3   -PAOLA  --     Time Frame (Stairs Goal 1, PT)  by discharge  -PAOLA  --     Progress/Outcome (Stairs Goal 1, PT)  goal not met  -PAOLA  --        Problem Specific Goal 1 (PT)    Problem Specific Goal 1 (PT)  Patient will completed tinetti balance and gait with medically appropriate.  -PAOLA  --     Time Frame (Problem Specific Goal 1, PT)  2 days  -PAOLA  --     Progress/Outcome (Problem Specific Goal 1, PT)  goal not met  -PAOLA  --        Problem Specific Goal 2 (PT)    Problem Specific Goal 2 (PT)  Patient will score >24/28 on tinetti to indicate low fall risk.  -PAOLA  --     Time Frame (Problem Specific Goal 2, PT)  by discharge  -PAOLA  --     Progress/Outcome (Problem Specific Goal 2, PT)  goal not met  -PAOLA  --        Patient Education Goal (PT)    Activity (Patient Education Goal, PT)  Patient will understand the how to implent intentional walking program to improve CV endurance and strength  -PAOLA  --     [place]/Cues/Accuracy (Memory Goal 2, PT)  demonstrates adequately;verbalizes understanding  -PAOLA  --     Time Frame (Patient Education Goal, PT)  by discharge  -PAOLA  --     Progress/Outcome (Patient Education Goal, PT)  goal not met  -PAOLA  --        Occupational Therapy Goals    Transfer Goal Selection (OT)  --  transfer, OT goal 1  -CS     Bathing Goal Selection (OT)  --  bathing, OT goal 1  -CS     Dressing Goal Selection (OT)  --  dressing, OT goal 1  -CS     Toileting Goal Selection (OT)  --  toileting, OT goal 1  -CS     Grooming Goal Selection (OT)  --  grooming, OT goal 1  -CS     Activity Tolerance Goal Selection (OT)  --  activity  tolerance, OT goal 1  -CS        Transfer Goal 1 (OT)    Activity/Assistive Device (Transfer Goal 1, OT)  --  transfers, all  -CS     [place] Level/Cues Needed (Transfer Goal 1, OT)  --  verbal cues required;tactile cues required;set-up required;supervision required  -CS     Time Frame (Transfer Goal 1, OT)  --  long term goal (LTG);by discharge  -CS     Progress/Outcome (Transfer Goal 1, OT)  --  goal met  -CS        Bathing Goal 1 (OT)    Activity/Assistive Device (Bathing Goal 1, OT)  --  bathing skills, all  -CS     [place] Level/Cues Needed (Bathing Goal 1, OT)  --  standby assist;verbal cues required;tactile cues required;set-up required  -CS     Time Frame (Bathing Goal 1, OT)  --  long term goal (LTG);by discharge  -CS     Progress/Outcomes (Bathing Goal 1, OT)  --  goal not met  -CS        Dressing Goal 1 (OT)    Activity/Assistive Device (Dressing Goal 1, OT)  --  dressing skills, all  -CS     [place]/Cues Needed (Dressing Goal 1, OT)  --  standby assist;verbal cues required;tactile cues required;set-up required  -CS     Time Frame (Dressing Goal 1, OT)  --  long term goal (LTG);by discharge  -CS     Progress/Outcome (Dressing Goal 1, OT)  --  goal met  -CS        Toileting Goal 1 (OT)    Activity/Device (Toileting Goal 1, OT)  --  toileting skills, all  -CS     [place] Level/Cues Needed (Toileting Goal 1, OT)  --  supervision required;verbal cues required;tactile cues required;set-up required  -CS     Time Frame (Toileting Goal 1, OT)  --  long term goal (LTG);by discharge  -CS     Progress/Outcome (Toileting Goal 1, OT)  --  goal met  -CS        Grooming Goal 1 (OT)    Activity/Device (Grooming Goal 1, OT)  --  grooming skills, all  -CS     [place] (Grooming Goal 1, OT)  --  supervision required;verbal cues required;tactile cues required;set-up required  -CS     Time Frame (Grooming Goal 1, OT)  --  long term goal (LTG);by discharge  -CS     Progress/Outcome (Grooming Goal 1, OT)   --  goal not met  -CS         Activity Tolerance Goal 1 (OT)    Activity Level (Endurance Goal 1, OT)  --  -- 20 min functional activity with 1-2 rest breaks   -CS     Time Frame (Activity Tolerance Goal 1, OT)  --  long term goal (LTG);by discharge  -CS     Progress/Outcome (Activity Tolerance Goal 1, OT)  --  goal met  -CS       User Key  (r) = Recorded By, (t) = Taken By, (c) = Cosigned By    Initials Name Provider Type Discipline    PAOLA Timothy Doe, PTA Physical Therapy Assistant PT    CS Jolene Bermudez, MELISSA/JANICE Occupational Therapy Assistant OT              PT Recommendation and Plan  Anticipated Discharge Disposition (PT): anticipate therapy at next level of care  Therapy Frequency (PT Clinical Impression): 2 times/day  Outcome Summary/Treatment Plan (PT)  Daily Summary of Progress (PT): progress toward functional goals as expected  Plan for Continued Treatment (PT): continue  Anticipated Discharge Disposition (PT): anticipate therapy at next level of care  Plan of Care Reviewed With: patient  Progress: improving  Outcome Summary: tx limited by elevated BP. pt was educated on HEP and home safety. pt appears to have good understanding. all DC concerns/questions assessed. no new goals met at this time. pt would continue to benefit from PT services.  Outcome Measures     Row Name 01/13/20 1200 01/13/20 1002 01/12/20 0920       How much help from another person do you currently need...    Turning from your back to your side while in flat bed without using bedrails?  --  4  -PAOLA  4  -TW    Moving from lying on back to sitting on the side of a flat bed without bedrails?  --  4  -PAOLA  4  -TW    Moving to and from a bed to a chair (including a wheelchair)?  --  3  -PAOLA  3  -TW    Standing up from a chair using your arms (e.g., wheelchair, bedside chair)?  --  3  -PAOLA  3  -TW    Climbing 3-5 steps with a railing?  --  3  -PAOLA  3  -TW    To walk in hospital room?  --  3  -PAOLA  3  -TW    AM-PAC 6 Clicks Score (PT)  --   20  -PAOLA  20  -TW       How much help from another is currently needed...    Putting on and taking off regular lower body clothing?  4  -CS  --  --    Bathing (including washing, rinsing, and drying)  3  -CS  --  --    Toileting (which includes using toilet bed pan or urinal)  3  -CS  --  --    Putting on and taking off regular upper body clothing  4  -CS  --  --    Taking care of personal grooming (such as brushing teeth)  3  -CS  --  --    Eating meals  4  -CS  --  --    AM-PAC 6 Clicks Score (OT)  21  -CS  --  --       Functional Assessment    Outcome Measure Options  --  AM-PAC 6 Clicks Basic Mobility (PT)  -PAOLA  AM-PAC 6 Clicks Basic Mobility (PT)  -TW    Row Name 01/12/20 0718 01/11/20 1200 01/11/20 0933       How much help from another person do you currently need...    Turning from your back to your side while in flat bed without using bedrails?  --  --  3  -TW    Moving from lying on back to sitting on the side of a flat bed without bedrails?  --  --  3  -TW    Moving to and from a bed to a chair (including a wheelchair)?  --  --  3  -TW    Standing up from a chair using your arms (e.g., wheelchair, bedside chair)?  --  --  3  -TW    Climbing 3-5 steps with a railing?  --  --  2  -TW    To walk in hospital room?  --  --  3  -TW    AM-PAC 6 Clicks Score (PT)  --  --  17  -TW       How much help from another is currently needed...    Putting on and taking off regular lower body clothing?  2  -KD  2  -ALBERTO  --    Bathing (including washing, rinsing, and drying)  2  -KD  2  -ALBERTO  --    Toileting (which includes using toilet bed pan or urinal)  3  -KD  3  -ALBERTO  --    Putting on and taking off regular upper body clothing  3  -KD  3  -ALBERTO  --    Taking care of personal grooming (such as brushing teeth)  3  -KD  3  -ALBERTO  --    Eating meals  4  -KD  4  -ALBERTO  --    AM-PAC 6 Clicks Score (OT)  17  -KD  17  -ALBERTO  --       Functional Assessment    Outcome Measure Options  --  AM-PAC 6 Clicks Daily Activity (OT)  -ALBERTO  AM-PAC 6  Clicks Basic Mobility (PT)  -    Row Name 01/10/20 1507             How much help from another person do you currently need...    Turning from your back to your side while in flat bed without using bedrails?  3  -PAOLA      Moving from lying on back to sitting on the side of a flat bed without bedrails?  3  -PAOLA      Moving to and from a bed to a chair (including a wheelchair)?  3  -PAOLA      Standing up from a chair using your arms (e.g., wheelchair, bedside chair)?  3  -PAOLA      Climbing 3-5 steps with a railing?  2  -PAOLA      To walk in hospital room?  3  -PAOLA      AM-PAC 6 Clicks Score (PT)  17  -PAOLA         Functional Assessment    Outcome Measure Options  AM-PAC 6 Clicks Basic Mobility (PT)  -        User Key  (r) = Recorded By, (t) = Taken By, (c) = Cosigned By    Initials Name Provider Type    Timothy Rebolledo PTA Physical Therapy Assistant    TW Paolo Gonzales PTA Physical Therapy Assistant    KD Pao Mejia, TORRES/L Occupational Therapy Assistant    CS Jolene Bermudez, TORRES/L Occupational Therapy Assistant    [place] Hoang OTA Occupational Therapy Assistant         Time Calculation:   PT Charges     Row Name 01/13/20 1251             Time Calculation    Start Time  1002  -PAOLA      Stop Time  1045  -PAOLA      Time Calculation (min)  43 min  -PAOLA         Time Calculation- PT    Total Timed Code Minutes- PT  43 minute(s)  -PAOLA        User Key  (r) = Recorded By, (t) = Taken By, (c) = Cosigned By    Initials Name Provider Type    PAOLA Timothy Doe PTA Physical Therapy Assistant        Therapy Charges for Today     Code Description Service Date Service Provider Modifiers Qty    [id number] HC PT SELF CARE/MGMT/TRAIN EA 15 MIN 1/13/2020 Timothy Doe PTA GP 3          PT G-Codes  Outcome Measure Options: AM-PAC 6 Clicks Basic Mobility (PT)  AM-PAC 6 Clicks Score (PT): 20  AM-PAC 6 Clicks Score (OT): 21    Timothy Doe PTA  1/13/2020

## 2020-01-13 NOTE — DISCHARGE SUMMARY
Discharge Summary    Date of Admission: 1/9/2020  Date of Discharge:  1/13/2020  Service: General Surgery  Attending: Ozzy Young    Procedures Performed: Procedure(s):  COLON RESECTION LAPAROSCOPIC CONVERTED TO OPEN  COLONOSCOPY    DONE IN OR WITH ENDO             (colonoscpy first)  Consults:   Consults     No orders found from 12/11/2019 to 1/10/2020.        Discharge Diagnoses:   Active Hospital Problems    Diagnosis   • **Adenomatous polyp     Added automatically from request for surgery [id number]         Hospital Course: 74-year-old female who underwent a laparoscopic partial open right colectomy for tubular adenomatous polyp of the right colon.  Postoperatively she is doing well.  She is tolerating regular diet and has full return of bowel function.  She is only required Tylenol for pain.  She can resume all her home medications she will follow-up with us in the office on Friday or sooner she has any other concerns or questions    Discharge Condition: Postoperatively Stable    Discharge Medications:     Your medication list      CHANGE how you take these medications      Instructions Last Dose Given Next Dose Due   metoprolol tartrate 25 MG tablet  Commonly known as:  LOPRESSOR  What changed:  See the new instructions.      TAKE 1/2 TABLET BY MOUTH EVERY 12 (TWELVE) HOURS.       Vitamin B-12 1000 MCG sublingual tablet  What changed:    · how much to take  · how to take this  · when to take this      Place 1 tablet under your tongue daily for the rest of your life.          CONTINUE taking these medications      Instructions Last Dose Given Next Dose Due   atorvastatin 40 MG tablet  Commonly known as:  LIPITOR      Take 1 tablet by mouth Daily.       fluticasone 50 MCG/ACT nasal spray  Commonly known as:  FLONASE      2 sprays into the nostril(s) as directed by provider Daily.       levothyroxine 88 MCG tablet  Commonly known as:  SYNTHROID, LEVOTHROID      Take 1 tablet by mouth Daily.       Loratadine 10 MG  capsule      Take 1 tablet by mouth Daily As Needed.       losartan-hydrochlorothiazide 100-25 MG per tablet  Commonly known as:  HYZAAR      Take 1 tablet by mouth Daily.       metFORMIN  MG 24 hr tablet  Commonly known as:  GLUCOPHAGE-XR      Take 2 tablets by mouth Daily Before Supper.       ONE TOUCH ULTRA SYSTEM KIT w/Device kit      Use for Home Glucose Monitoring       ONE TOUCH ULTRA TEST test strip  Generic drug:  glucose blood      TEST BLOOD SUGARS ONCE DAILY       vitamin C 500 MG tablet  Commonly known as:  ASCORBIC ACID      Take 500 mg by mouth Daily.          STOP taking these medications    erythromycin 500 MG EC tablet  Commonly known as:  GEORGE-TAB        metroNIDAZOLE 500 MG tablet  Commonly known as:  FLAGYL                 Activity as instructed by Dr. Young.  No lifting over 20 lbs until seen in the office.  Shower as instructed.      Regular Diet      Follow-up Appointments  Your Scheduled Appointments    Jan 29, 2020  2:45 PM CST  Follow Up with Sonido Moyer MD  [facility] OTOLARYNGOLOGY (--) [address] [place] [address] [address]  [facility] [address] [place] [id number] [phone]   Arrive 15 minutes prior to appointment.     Jun 03, 2020  9:00 AM CDT  Follow Up with Jeffry Irby MD  [facility] FAMILY MEDICINE (--) [address]  [facility] [place] [id number] [phone]   Arrive 15 minutes prior to appointment.                  This document has been electronically signed by Ozzy Young MD on January 13, 2020 7:36 AM

## 2020-01-13 NOTE — THERAPY TREATMENT NOTE
Acute Care - Occupational Therapy Treatment Note  [facility]     Patient Name: Sharon Esposito  : 1945  MRN: [id number]  Today's Date: 2020  Onset of Illness/Injury or Date of Surgery: 20  Date of Referral to OT: 01/10/20  Referring Physician: ELLIE Mcneill MD    Admit Date: 2020       ICD-10-CM ICD-9-CM   1. Adenomatous polyp D36.9 229.9   2. Decreased functional mobility R26.89 781.99   3. Impaired mobility and activities of daily living Z74.09 799.89     Patient Active Problem List   Diagnosis   • Need for hepatitis C screening test   • Hyperlipidemia   • Acquired hypothyroidism   • Seasonal allergies   • Osteopenia   • Essential hypertension   • Edema   • Diverticulitis of colon   • Allergic rhinitis   • Postmenopausal   • Ingrown toenail   • Paresthesia of both feet   • Type 2 diabetes mellitus without complication, without long-term current use of insulin (CMS/HCC)   • Cobalamin deficiency   • Diabetic peripheral neuropathy (CMS/HCC)   • Encounter for screening for malignant neoplasm of colon   • Villous adenoma of colon 2019   • Adenomatous polyp     Past Medical History:   Diagnosis Date   • Acquired hypothyroidism     with hashimoto's thyroiditis      • Acute bronchitis    • Acute sinusitis    • Allergic rhinitis    • Bilateral hand pain    • Cough    • Diabetes mellitus (CMS/HCC)    • Diverticulitis of colon    • Edema     idiopathic state, history of      • Encounter for gynecological examination (general) (routine) without abnormal findings    • Encounter for screening for osteoporosis    • Essential hypertension    • Hemorrhoids     internal & external      • Hx of bone density study     DEXA BONE DENSITY APPENDICULAR: osteopenia, 2013   • Hx of screening mammography     MAMMOGRAM SCREENING: x2, 2014   • Hyperlipidemia     with elevated low density lipoprotein   • 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     history   •  Infection of skin and subcutaneous tissue     Infection of skin AND/OR subcutaneous tissue      • Onychogryposis     recurrent ingrown toenail      • Open wound of face    • Osteopenia    • Screening for malignant neoplasm of breast    • Screening for osteoporosis    • Seasonal allergies     Seasonal allergy      • Vitamin deficiency    • Wears glasses      Past Surgical History:   Procedure Laterality Date   • CATARACT EXTRACTION, BILATERAL     • COLON RESECTION N/A 1/9/2020    Procedure: COLON RESECTION LAPAROSCOPIC CONVERTED TO OPEN;  Surgeon: Ozzy Young MD;  Location: [facility] OR;  Service: General   • COLONOSCOPY      Approximately 04/2009   • COLONOSCOPY N/A 8/14/2019    Procedure: COLONOSCOPY Monday or Wed;  Surgeon: Tony Curiel MD;  Location: [facility] ENDOSCOPY;  Service: Gastroenterology   • COLONOSCOPY N/A 11/13/2019    Procedure: COLONOSCOPY A Wed in Nov;  Surgeon: Tony Curiel MD;  Location: [facility] ENDOSCOPY;  Service: Gastroenterology   • COLONOSCOPY N/A 1/9/2020    Procedure: COLONOSCOPY    DONE IN OR WITH ENDO             (colonoscpy first);  Surgeon: Ozzy Young MD;  Location: [facility] OR;  Service: General   • ENDOSCOPY AND COLONOSCOPY  04/20/2009    A few small diverticula in the sigmoid & the descending colon. Small internal & external hemorrhoids were present. Colonoscopy otherwise normal   • INJECTION OF MEDICATION  06/23/2015    Celestone (betamethasone) x2   • INJECTION OF MEDICATION  12/08/2014    Toradol    • PAP SMEAR  06/23/2009    Negative for intraepithelial lesion or malignancy   • SKIN BIOPSY  09/30/2010    L neck lesion- seborrheic keratosis. right infraorbital-seborrheic keratosis, right lateral orbital- intradermal nevus. forehead lesion-early squamous papilloma       Therapy Treatment    Rehabilitation Treatment Summary     Row Name 01/13/20 1002 01/13/20 0830          Treatment Time/Intention    Discipline  physical therapy assistant  -PAOLA  occupational therapy  assistant  -CS     Document Type  therapy note (daily note)  -PAOLA  therapy note (daily note)  -CS     Subjective Information  --  no complaints  -CS     Mode of Treatment  physical therapy;individual therapy  -PAOLA  occupational therapy  -CS     Therapy Frequency (OT Eval)  --  daily  -CS     Patient Effort  good  -PAOLA  excellent  -CS     Existing Precautions/Restrictions  fall Pt no longer on O2.   -PAOLA  fall  -CS     Recorded by [PAOLA] Timothy Doe, BRYCE 01/13/20 1011 [CS] Jolene Bermudez TORRES/L 01/13/20 1244     Row Name 01/13/20 1002 01/13/20 0830          Vital Signs    Pre Systolic BP Rehab  198 nsg notified. no mobility performed at this time.   -PAOLA  --     Pre Treatment Diastolic BP  82  -JC2  --     Post Systolic BP Rehab  190  -JC2  --     Post Treatment Diastolic BP  86  -JC2  --     Pretreatment Heart Rate (beats/min)  84  -JC2  76  -CS     Posttreatment Heart Rate (beats/min)  76  -JC2  --     Pre SpO2 (%)  96  -JC2  95  -CS     O2 Delivery Pre Treatment  room air  -JC2  room air  -CS     Post SpO2 (%)  97  -JC2  --     O2 Delivery Post Treatment  room air  -JC2  --     Pre Patient Position  Standing  -PAOLA  Sitting  -CS     Intra Patient Position  --  Standing  -CS     Post Patient Position  Sitting  -JC2  Sitting  -CS     Recorded by [PAOLA] Timothy Doe, PTA 01/13/20 1201  [JC2] Timothy Doe, PTA 01/13/20 1044 [CS] Jolene Bermudez TORRES/L 01/13/20 1244     Row Name 01/13/20 1002 01/13/20 0830          Cognitive Assessment/Intervention- PT/OT    Affect/Mental Status (Cognitive)  WFL  -PAOLA  WFL  -CS     Orientation Status (Cognition)  oriented x 4  -PAOLA  oriented x 4  -CS     Follows Commands (Cognition)  WFL  -PAOLA  WFL  -CS     Cognitive Function (Cognitive)  WFL  -PAOLA  WFL  -CS     Personal Safety Interventions  fall prevention program maintained;gait belt;muscle strengthening facilitated;nonskid shoes/slippers when out of bed;supervised activity  -JC2  --     Recorded by [PAOLA] Timothy Doe, PTA  01/13/20 1011  [JC2] Timothy Doe, PTA 01/13/20 1201 [CS] Jolene Bermudez, TORRES/L 01/13/20 1244     Row Name 01/13/20 1002             Safety Issues, Functional Mobility    Impairments Affecting Function (Mobility)  --  -PAOLA      Recorded by [PAOLA] Timothy Doe, PTA 01/13/20 1243      Row Name 01/13/20 1002             Bed Mobility Assessment/Treatment    Supine-Sit-Supine [place] (Bed Mobility)  --  -PAOLA      Assistive Device (Bed Mobility)  --  -PAOLA      Recorded by [PAOLA] Timothy Doe, PTA 01/13/20 1243      Row Name 01/13/20 1002 01/13/20 0830          Transfer Assessment/Treatment    Transfer Assessment/Treatment  stand-sit transfer  -PAOLA  sit-stand transfer;stand-sit transfer;toilet transfer  -CS     Recorded by [PAOLA] Timothy Doe, PTA 01/13/20 1243 [CS] Jolene Bermudez, TORRES/L 01/13/20 1244     Row Name 01/13/20 1002 01/13/20 0830          Sit-Stand Transfer    Sit-Stand [place] (Transfers)  --  -PAOLA  independent  -CS     Assistive Device (Sit-Stand Transfers)  --  -PAOLA  other (see comments) none  -CS     Recorded by [PAOLA] Timothy Doe, PTA 01/13/20 1243 [CS] Jolene Bermudez TORRES/L 01/13/20 1244     Row Name 01/13/20 1002 01/13/20 0830          Stand-Sit Transfer    Stand-Sit [place] (Transfers)  independent  -PAOLA  independent  -CS     Assistive Device (Stand-Sit Transfers)  --  -PAOLA  other (see comments) none  -CS     Recorded by [PAOLA] Timothy Doe, PTA 01/13/20 1243 [CS] Jolene Bermudez, TORRES/L 01/13/20 1244     Row Name 01/13/20 0830             Toilet Transfer    Type (Toilet Transfer)  sit-stand;stand-sit  -CS      [place] Level (Toilet Transfer)  supervision  -CS      Assistive Device (Toilet Transfer)  commode  -CS      Recorded by [CS] Jolene Bermudez TORRES/L 01/13/20 1244      Row Name 01/13/20 1002             Gait/Stairs Assessment/Training    Gait/Stairs Assessment/Training  --  -PAOLA      [place] Level (Gait)  --  -PAOLA      Assistive Device (Gait)  --   -PAOLA      Deviations/Abnormal Patterns (Gait)  --  -PAOLA      [place] Level (Stairs)  --  -PAOLA      Handrail Location (Stairs)  --  -PAOLA      Ascending Technique (Stairs)  --  -PAOLA      Descending Technique (Stairs)  --  -PAOLA      Recorded by [PAOLA] Timothy Doe PTA 01/13/20 1243      Row Name 01/13/20 0830             ADL Assessment/Intervention    BADL Assessment/Intervention  upper body dressing;lower body dressing;grooming;toileting  -CS      Recorded by [CS] Jolene Bermudez COTA/L 01/13/20 1244      Row Name 01/13/20 0830             Upper Body Dressing Assessment/Training    Upper Body Dressing [place] Level  don;bra/undergarment;front opening garment;independent  -CS      Upper Body Dressing Position  unsupported standing  -CS      Recorded by [CS] Jolene Bermudez COTA/L 01/13/20 1244      Row Name 01/13/20 0830             Lower Body Dressing Assessment/Training    Lower Body Dressing [place] Level  don;pants/bottoms;shoes/slippers;socks;undergarment;supervision  -CS      Lower Body Dressing Position  unsupported sitting;unsupported standing  -CS      Recorded by [CS] Jolene Bermudez COTA/L 01/13/20 1244      Row Name 01/13/20 0830             Grooming Assessment/Training    [place] Level (Grooming)  grooming skills;wash face, hands;independent  -CS      Grooming Position  unsupported standing  -CS      Recorded by [CS] Jolene Bermudez COTA/L 01/13/20 1244      Row Name 01/13/20 0830             Toileting Assessment/Training    [place] Level (Toileting)  toileting skills;adjust/manage clothing;perform perineal hygiene;supervision  -CS      Assistive Devices (Toileting)  commode  -CS      Toileting Position  unsupported sitting;unsupported standing  -CS      Recorded by [CS] Jolene Bermudez COTA/L 01/13/20 1244      Row Name 01/13/20 0830             Motor Skills Assessment/Interventions    Additional Documentation  Therapeutic Exercise (Group)  -CS      Recorded by [CS]  Jolene Bermudez COTA/L 01/13/20 1244      Row Name 01/13/20 0830             Therapeutic Exercise    Upper Extremity (Therapeutic Exercise)  bicep curl, bilateral  -CS      Upper Extremity Range of Motion (Therapeutic Exercise)  shoulder flexion/extension, bilateral;shoulder abduction/adduction, bilateral;shoulder horizontal abduction/adduction, bilateral;elbow flexion/extension, bilateral  -CS      Weight/Resistance (Therapeutic Exercise)  red  -CS      Exercise Type (Therapeutic Exercise)  AROM (active range of motion)  -CS      Position (Therapeutic Exercise)  seated  -CS      Sets/Reps (Therapeutic Exercise)  20  -CS      Equipment (Therapeutic Exercise)  resistive bands  -CS      Expected Outcome (Therapeutic Exercise)  improve functional tolerance, self-care activity;improve performance, transfer skills  -CS      Recorded by [CS] Jolene Bermudez COTA/L 01/13/20 1244      Row Name 01/13/20 1002 01/13/20 0830          Positioning and Restraints    Pre-Treatment Position  standing in room  -PAOLA  sitting in chair/recliner  -CS     Post Treatment Position  bed  -PAOLA  bed  -CS     In Bed  sitting EOB;encouraged to call for assist;with family/caregiver  -PAOLA  sitting EOB;call light within reach;encouraged to call for assist  -CS     Recorded by [PAOLA] Timothy Doe, PTA 01/13/20 1243 [CS] Jolene Bermudez COTA/L 01/13/20 1244     Row Name 01/13/20 1002 01/13/20 0830          Pain Scale: Numbers Pre/Post-Treatment    Pain Scale: Numbers, Pretreatment  0/10 - no pain  -PAOLA  0/10 - no pain  -CS     Pain Scale: Numbers, Post-Treatment  0/10 - no pain  -PAOLA  0/10 - no pain  -CS     Recorded by [PAOLA] Timothy Doe, PTA 01/13/20 1011 [CS] Jolene Bermudez COTA/L 01/13/20 1244     Row Name                Wound 01/09/20 0816 abdomen Incision    Wound - Properties Group Date first assessed: 01/09/20 [TR] Time first assessed: 0816 [TR] Present on Hospital Admission: N [TR] Location: abdomen [TR] Primary Wound Type:  Incision [TR] Recorded by:  [TR] Ariane Menendez RN 01/09/20 0832    Row Name 01/13/20 0830             Outcome Summary/Treatment Plan (OT)    Daily Summary of Progress (OT)  progress toward functional goals as expected  -CS      Plan for Continued Treatment (OT)  cont OT POC  -CS      Anticipated Discharge Disposition (OT)  anticipate therapy at next level of care  -CS      Recorded by [CS] Jolene Bermudez TORRES/L 01/13/20 1244      Row Name 01/13/20 1002             Outcome Summary/Treatment Plan (PT)    Daily Summary of Progress (PT)  progress toward functional goals as expected  -PAOLA      Plan for Continued Treatment (PT)  continue  -PAOLA      Anticipated Discharge Disposition (PT)  anticipate therapy at next level of care  -JC2      Recorded by [PAOLA] Timothy Doe, PTA 01/13/20 1243  [JC2] Timothy Doe, PTA 01/13/20 1011        User Key  (r) = Recorded By, (t) = Taken By, (c) = Cosigned By    Initials Name Effective Dates Discipline    PAOLA Timothy Doe, PTA 03/07/18 -  PT    CS Jolene Bermudez TORRES/L 03/07/18 -  OT    TR Ariane Menendez RN 10/17/16 -  Nurse        Wound 01/09/20 0816 abdomen Incision (Active)   Dressing Appearance dry;intact 1/13/2020  8:04 AM   Closure Open to air;Staples 1/13/2020  8:04 AM   Base dressing in place, unable to visualize 1/12/2020  8:10 PM   Drainage Amount none 1/12/2020  8:10 PM     Rehab Goal Summary     Row Name 01/13/20 1002 01/13/20 0830          Physical Therapy Goals    Problem Specific Goal Selection (PT)  problem specific goal 2, PT;problem specific goal 1, PT  -PAOLA  --     Additional Documentation  Problem Specific Goal Selection (PT) (Row)  -  --        Bed Mobility Goal 1 (PT)    Activity/Assistive Device (Bed Mobility Goal 1, PT)  sit to supine;supine to sit  -PAOLA  --     [place] Level/Cues Needed (Bed Mobility Goal 1, PT)  independent  -PAOLA  --     Time Frame (Bed Mobility Goal 1, PT)  by discharge  -PAOLA  --     Progress/Outcomes (Bed Mobility Goal  1, PT)  goal not met  -PAOLA  --        Transfer Goal 1 (PT)    Activity/Assistive Device (Transfer Goal 1, PT)  bed-to-chair/chair-to-bed  -PAOLA  --     [place] Level/Cues Needed (Transfer Goal 1, PT)  independent;conditional independence  -PAOLA  --     Time Frame (Transfer Goal 1, PT)  by discharge  -PAOLA  --     Progress/Outcome (Transfer Goal 1, PT)  goal not met  -PAOLA  --        Gait Training Goal 1 (PT)    Activity/Assistive Device (Gait Training Goal 1, PT)  gait (walking locomotion);assistive device use;backward stepping  -PAOLA  --     [place] Level (Gait Training Goal 1, PT)  independent  -PAOLA  --     Distance (Gait Goal 1, PT)  600 ftx1  -PAOLA  --     Time Frame (Gait Training Goal 1, PT)  by discharge  -PAOLA  --     Progress/Outcome (Gait Training Goal 1, PT)  goal not met  -PAOLA  --        Stairs Goal 1 (PT)    Activity/Assistive Device (Stairs Goal 1, PT)  stairs, all skills;ascending stairs;descending stairs  -PAOLA  --     [place] Level/Cues Needed (Stairs Goal 1, PT)  independent  -PAOLA  --     Number of Stairs (Stairs Goal 1, PT)  3   -PAOLA  --     Time Frame (Stairs Goal 1, PT)  by discharge  -PAOLA  --     Progress/Outcome (Stairs Goal 1, PT)  goal not met  -PAOLA  --        Problem Specific Goal 1 (PT)    Problem Specific Goal 1 (PT)  Patient will completed tinetti balance and gait with medically appropriate.  -PAOLA  --     Time Frame (Problem Specific Goal 1, PT)  2 days  -PAOLA  --     Progress/Outcome (Problem Specific Goal 1, PT)  goal not met  -PAOLA  --        Problem Specific Goal 2 (PT)    Problem Specific Goal 2 (PT)  Patient will score >24/28 on tinetti to indicate low fall risk.  -PAOLA  --     Time Frame (Problem Specific Goal 2, PT)  by discharge  -PAOLA  --     Progress/Outcome (Problem Specific Goal 2, PT)  goal not met  -PAOLA  --        Patient Education Goal (PT)    Activity (Patient Education Goal, PT)  Patient will understand the how to implent intentional walking program to improve CV endurance and strength   -PAOLA  --     [place]/Cues/Accuracy (Memory Goal 2, PT)  demonstrates adequately;verbalizes understanding  -PAOLA  --     Time Frame (Patient Education Goal, PT)  by discharge  -  --     Progress/Outcome (Patient Education Goal, PT)  goal not met  -PAOLA  --        Occupational Therapy Goals    Transfer Goal Selection (OT)  --  transfer, OT goal 1  -CS     Bathing Goal Selection (OT)  --  bathing, OT goal 1  -CS     Dressing Goal Selection (OT)  --  dressing, OT goal 1  -CS     Toileting Goal Selection (OT)  --  toileting, OT goal 1  -CS     Grooming Goal Selection (OT)  --  grooming, OT goal 1  -CS     Activity Tolerance Goal Selection (OT)  --  activity tolerance, OT goal 1  -CS        Transfer Goal 1 (OT)    Activity/Assistive Device (Transfer Goal 1, OT)  --  transfers, all  -CS     [place] Level/Cues Needed (Transfer Goal 1, OT)  --  verbal cues required;tactile cues required;set-up required;supervision required  -CS     Time Frame (Transfer Goal 1, OT)  --  long term goal (LTG);by discharge  -CS     Progress/Outcome (Transfer Goal 1, OT)  --  goal met  -CS        Bathing Goal 1 (OT)    Activity/Assistive Device (Bathing Goal 1, OT)  --  bathing skills, all  -CS     [place] Level/Cues Needed (Bathing Goal 1, OT)  --  standby assist;verbal cues required;tactile cues required;set-up required  -CS     Time Frame (Bathing Goal 1, OT)  --  long term goal (LTG);by discharge  -CS     Progress/Outcomes (Bathing Goal 1, OT)  --  goal not met  -CS        Dressing Goal 1 (OT)    Activity/Assistive Device (Dressing Goal 1, OT)  --  dressing skills, all  -CS     [place]/Cues Needed (Dressing Goal 1, OT)  --  standby assist;verbal cues required;tactile cues required;set-up required  -CS     Time Frame (Dressing Goal 1, OT)  --  long term goal (LTG);by discharge  -CS     Progress/Outcome (Dressing Goal 1, OT)  --  goal met  -CS        Toileting Goal 1 (OT)    Activity/Device (Toileting Goal 1, OT)  --  toileting  skills, all  -CS     [place] Level/Cues Needed (Toileting Goal 1, OT)  --  supervision required;verbal cues required;tactile cues required;set-up required  -CS     Time Frame (Toileting Goal 1, OT)  --  long term goal (LTG);by discharge  -CS     Progress/Outcome (Toileting Goal 1, OT)  --  goal met  -CS        Grooming Goal 1 (OT)    Activity/Device (Grooming Goal 1, OT)  --  grooming skills, all  -CS     [place] (Grooming Goal 1, OT)  --  supervision required;verbal cues required;tactile cues required;set-up required  -CS     Time Frame (Grooming Goal 1, OT)  --  long term goal (LTG);by discharge  -CS     Progress/Outcome (Grooming Goal 1, OT)  --  goal not met  -CS         Activity Tolerance Goal 1 (OT)    Activity Level (Endurance Goal 1, OT)  --  -- 20 min functional activity with 1-2 rest breaks   -CS     Time Frame (Activity Tolerance Goal 1, OT)  --  long term goal (LTG);by discharge  -CS     Progress/Outcome (Activity Tolerance Goal 1, OT)  --  goal met  -CS       User Key  (r) = Recorded By, (t) = Taken By, (c) = Cosigned By    Initials Name Provider Type Discipline    PAOLA Timothy Doe, PTA Physical Therapy Assistant PT    Jolene Broussard COTA/JANICE Occupational Therapy Assistant OT            OT Recommendation and Plan  Outcome Summary/Treatment Plan (OT)  Daily Summary of Progress (OT): progress toward functional goals as expected  Plan for Continued Treatment (OT): cont OT POC  Anticipated Discharge Disposition (OT): anticipate therapy at next level of care  Therapy Frequency (OT Eval): daily  Daily Summary of Progress (OT): progress toward functional goals as expected  Outcome Measures     Row Name 01/13/20 1200 01/13/20 1002 01/12/20 0920       How much help from another person do you currently need...    Turning from your back to your side while in flat bed without using bedrails?  --  4  -PAOLA  4  -TW    Moving from lying on back to sitting on the side of a flat bed without bedrails?  --   4  -PAOLA  4  -TW    Moving to and from a bed to a chair (including a wheelchair)?  --  3  -PAOLA  3  -TW    Standing up from a chair using your arms (e.g., wheelchair, bedside chair)?  --  3  -PAOLA  3  -TW    Climbing 3-5 steps with a railing?  --  3  -PAOLA  3  -TW    To walk in hospital room?  --  3  -PAOLA  3  -TW    AM-PAC 6 Clicks Score (PT)  --  20  -PAOLA  20  -TW       How much help from another is currently needed...    Putting on and taking off regular lower body clothing?  4  -CS  --  --    Bathing (including washing, rinsing, and drying)  3  -CS  --  --    Toileting (which includes using toilet bed pan or urinal)  3  -CS  --  --    Putting on and taking off regular upper body clothing  4  -CS  --  --    Taking care of personal grooming (such as brushing teeth)  3  -CS  --  --    Eating meals  4  -CS  --  --    AM-[facility] 6 Clicks Score (OT)  21  -CS  --  --       Functional Assessment    Outcome Measure Options  --  -[facility] 6 Clicks Basic Mobility (PT)  -[facility] 6 Clicks Basic Mobility (PT)  -    Row Name 01/12/20 0718 01/11/20 1200 01/11/20 0933       How much help from another person do you currently need...    Turning from your back to your side while in flat bed without using bedrails?  --  --  3  -TW    Moving from lying on back to sitting on the side of a flat bed without bedrails?  --  --  3  -TW    Moving to and from a bed to a chair (including a wheelchair)?  --  --  3  -TW    Standing up from a chair using your arms (e.g., wheelchair, bedside chair)?  --  --  3  -TW    Climbing 3-5 steps with a railing?  --  --  2  -TW    To walk in hospital room?  --  --  3  -TW    AM-PAC 6 Clicks Score (PT)  --  --  17  -TW       How much help from another is currently needed...    Putting on and taking off regular lower body clothing?  2  -KD  2  -ALBERTO  --    Bathing (including washing, rinsing, and drying)  2  -KD  2  -ALBERTO  --    Toileting (which includes using toilet bed pan or urinal)  3  -KD  3  -ALBERTO  --    Putting on and  taking off regular upper body clothing  3  -KD  3  -ALBERTO  --    Taking care of personal grooming (such as brushing teeth)  3  -KD  3  -ALBERTO  --    Eating meals  4 -KD  4  -ALBERTO  --    AM-PAC 6 Clicks Score (OT)  17 -KD  17  -JB  --       Functional Assessment    Outcome Measure Options  --  AM-PAC 6 Clicks Daily Activity (OT)  -ALBERTO  AM-PAC 6 Clicks Basic Mobility (PT)  -    Row Name 01/10/20 1507             How much help from another person do you currently need...    Turning from your back to your side while in flat bed without using bedrails?  3  -PAOLA      Moving from lying on back to sitting on the side of a flat bed without bedrails?  3  -PAOLA      Moving to and from a bed to a chair (including a wheelchair)?  3  -PAOLA      Standing up from a chair using your arms (e.g., wheelchair, bedside chair)?  3  -PAOLA      Climbing 3-5 steps with a railing?  2  -PAOLA      To walk in hospital room?  3  -PAOLA      AM-PAC 6 Clicks Score (PT)  17  -PAOLA         Functional Assessment    Outcome Measure Options  AM-PAC 6 Clicks Basic Mobility (PT)  -        User Key  (r) = Recorded By, (t) = Taken By, (c) = Cosigned By    Initials Name Provider Type    PAOLA Timothy Doe PTA Physical Therapy Assistant    TW Paolo Gonzales, BRYCE Physical Therapy Assistant    KD Pao Mejia COTA/L Occupational Therapy Assistant    Jolene Broussard, MELISSA/JANICE Occupational Therapy Assistant    [place] Nena Hoang OTA Occupational Therapy Assistant           Time Calculation:   Time Calculation- OT     Row Name 01/13/20 1249             Time Calculation- OT    OT Start Time  0830  -CS      OT Stop Time  1000  -      OT Time Calculation (min)  90 min  -CS      Total Timed Code Minutes- OT  90 minute(s)  -      OT Received On  01/13/20  -        User Key  (r) = Recorded By, (t) = Taken By, (c) = Cosigned By    Initials Name Provider Type    Jolene Broussard, MELISSA/JANICE Occupational Therapy Assistant        Therapy Charges for Today     Code  Description Service Date Service Provider Modifiers Qty    42837012551 HC OT SELF CARE/MGMT/TRAIN EA 15 MIN 1/13/2020 Jolene Bermudez COTA/L GO 4    14549038335 HC OT THER PROC EA 15 MIN 1/13/2020 Jolene Bermudez COTA/L GO 2               DERRELL Schaefer  1/13/2020

## 2020-01-14 ENCOUNTER — READMISSION MANAGEMENT (OUTPATIENT)
Dept: CALL CENTER | Facility: HOSPITAL | Age: 75
End: 2020-01-14

## 2020-01-14 NOTE — OUTREACH NOTE
Prep Survey      Responses   Facility patient discharged from?  [place]   Is patient eligible?  Yes   Discharge diagnosis  Adenomatous polyp   Does the patient have one of the following disease processes/diagnoses(primary or secondary)?  General Surgery   Does the patient have Home health ordered?  No   Is there a DME ordered?  No   Prep survey completed?  Yes          Quynh Woods RN

## 2020-01-16 ENCOUNTER — READMISSION MANAGEMENT (OUTPATIENT)
Dept: CALL CENTER | Facility: HOSPITAL | Age: 75
End: 2020-01-16

## 2020-01-16 NOTE — OUTREACH NOTE
General Surgery Week 1 Survey      Responses   Facility patient discharged from?  [place]   Does the patient have one of the following disease processes/diagnoses(primary or secondary)?  General Surgery   Is there a successful TCM telephone encounter documented?  No   Week 1 attempt successful?  No   Unsuccessful attempts  Attempt 1          Kary Rios RN

## 2020-01-17 ENCOUNTER — OFFICE VISIT (OUTPATIENT)
Dept: SURGERY | Facility: CLINIC | Age: 75
End: 2020-01-17

## 2020-01-17 VITALS
HEIGHT: 64 IN | HEART RATE: 70 BPM | DIASTOLIC BLOOD PRESSURE: 80 MMHG | BODY MASS INDEX: 32.61 KG/M2 | TEMPERATURE: 97 F | WEIGHT: 191 LBS | SYSTOLIC BLOOD PRESSURE: 122 MMHG

## 2020-01-17 DIAGNOSIS — D36.9 ADENOMATOUS POLYP: Primary | ICD-10-CM

## 2020-01-17 PROCEDURE — 99024 POSTOP FOLLOW-UP VISIT: CPT | Performed by: SURGERY

## 2020-01-17 NOTE — PROGRESS NOTES
Final Diagnosis   RIGHT HEMICOLECTOMY:  TUBULAR ADENOMA WITH MODERATE DYSPLASIA (2), ASCENDING COLON.  THE EXCISIONAL MARGINS AND SEVENTEEN (17) REGIONAL LYMPH NODES ARE FREE OF NEOPLASM     Patient is now 8 days status post laparoscopic right colon resection for actually 2 dysplastic polyps.  Patient is doing well no complaints.  I went over pathology with her.  Her incisions are clean healing nicely.  We remove the staples and Place Steri-Strips.  Patient will follow-up with us in 1 month or sooner she has any other concerns or questions

## 2020-01-17 NOTE — PATIENT INSTRUCTIONS

## 2020-01-18 ENCOUNTER — READMISSION MANAGEMENT (OUTPATIENT)
Dept: CALL CENTER | Facility: HOSPITAL | Age: 75
End: 2020-01-18

## 2020-01-18 NOTE — OUTREACH NOTE
General Surgery Week 1 Survey      Responses   Facility patient discharged from?  [place]   Does the patient have one of the following disease processes/diagnoses(primary or secondary)?  General Surgery   Is there a successful TCM telephone encounter documented?  No   Week 1 attempt successful?  No   Unsuccessful attempts  Attempt 2          Ophelia Link RN

## 2020-01-20 ENCOUNTER — OFFICE VISIT (OUTPATIENT)
Dept: FAMILY MEDICINE CLINIC | Facility: CLINIC | Age: 75
End: 2020-01-20

## 2020-01-20 VITALS
HEIGHT: 64 IN | OXYGEN SATURATION: 98 % | BODY MASS INDEX: 32.78 KG/M2 | DIASTOLIC BLOOD PRESSURE: 72 MMHG | WEIGHT: 192 LBS | SYSTOLIC BLOOD PRESSURE: 130 MMHG | HEART RATE: 84 BPM

## 2020-01-20 DIAGNOSIS — E78.00 PURE HYPERCHOLESTEROLEMIA: ICD-10-CM

## 2020-01-20 DIAGNOSIS — I10 ESSENTIAL HYPERTENSION: Primary | ICD-10-CM

## 2020-01-20 DIAGNOSIS — E11.41 TYPE 2 DIABETES MELLITUS WITH DIABETIC MONONEUROPATHY, WITHOUT LONG-TERM CURRENT USE OF INSULIN (HCC): ICD-10-CM

## 2020-01-20 DIAGNOSIS — E03.9 ACQUIRED HYPOTHYROIDISM: ICD-10-CM

## 2020-01-20 DIAGNOSIS — E11.42 DIABETIC PERIPHERAL NEUROPATHY (HCC): ICD-10-CM

## 2020-01-20 DIAGNOSIS — E66.09 CLASS 1 OBESITY DUE TO EXCESS CALORIES WITH SERIOUS COMORBIDITY AND BODY MASS INDEX (BMI) OF 32.0 TO 32.9 IN ADULT: ICD-10-CM

## 2020-01-20 DIAGNOSIS — E53.8 COBALAMIN DEFICIENCY: ICD-10-CM

## 2020-01-20 PROCEDURE — 99214 OFFICE O/P EST MOD 30 MIN: CPT | Performed by: FAMILY MEDICINE

## 2020-01-20 NOTE — PROGRESS NOTES
Subjective   Sharon Esposito is a 74 y.o. female.     Hyperlipidemia   This is a chronic problem. The current episode started more than 1 year ago. The problem is controlled. Recent lipid tests were reviewed and are normal. Exacerbating diseases include hypothyroidism and obesity. Pertinent negatives include no chest pain, myalgias or shortness of breath.   Hypertension   This is a chronic problem. The current episode started more than 1 year ago. The problem is unchanged. The problem is controlled (pt checks occisionally at [facility] pharmacy, runs [id number]/90). Pertinent negatives include no chest pain, orthopnea, palpitations, peripheral edema, PND or shortness of breath.   Diabetes   She presents for her initial diabetic visit. She has type 2 diabetes mellitus. Pertinent negatives for diabetes include no chest pain, no foot paresthesias, no foot ulcerations and no polyuria. Her breakfast blood glucose is taken between 8-9 am. Her breakfast blood glucose range is generally 110-130 mg/dl. An ACE inhibitor/angiotensin II receptor blocker is being taken. Eye exam is not current.   Hypothyroidism   This is a chronic problem. The current episode started more than 1 year ago. The problem has been unchanged. Pertinent negatives include no chest pain or myalgias.        The following portions of the patient's history were reviewed and updated as appropriate: allergies, current medications, past family history, past medical history, past social history, past surgical history and problem list.    Review of Systems   Respiratory: Negative for shortness of breath.    Cardiovascular: Negative for chest pain, palpitations, orthopnea and PND.   Endocrine: Negative for polyuria.   Musculoskeletal: Negative for myalgias.       Objective   Physical Exam   Constitutional: She is oriented to person, place, and time. She appears well-developed and well-nourished. No distress.   HENT:   Head: Normocephalic and atraumatic.    "  Cardiovascular: Normal rate, regular rhythm, normal heart sounds and intact distal pulses. Exam reveals no gallop and no friction rub.   No murmur heard.  Pulmonary/Chest: Effort normal and breath sounds normal. No respiratory distress. She has no wheezes. She has no rales. She exhibits no tenderness.   Musculoskeletal: She exhibits no edema.    Sharon had a diabetic foot exam performed (callus noted) today.   During the foot exam she had a monofilament test performed (decreased).  Vascular Status -  Her right foot exhibits no edema. Her left foot exhibits no edema.  Neurological: She is alert and oriented to person, place, and time.   Skin: Skin is warm and dry. She is not diaphoretic.   Psychiatric: She has a normal mood and affect. Her behavior is normal. Judgment and thought content normal.   Nursing note and vitals reviewed.      Assessment/Plan   Problems Addressed this Visit        Cardiovascular and Mediastinum    Hyperlipidemia    Essential hypertension - Primary       Digestive    Cobalamin deficiency       Endocrine    Acquired hypothyroidism    Diabetic peripheral neuropathy (CMS/[facility])      Other Visit Diagnoses     Type 2 diabetes mellitus with diabetic mononeuropathy, without long-term current use of insulin (CMS/[facility])        Class 1 obesity due to excess calories with serious comorbidity and body mass index (BMI) of 32.0 to 32.9 in adult                Current outpatient and discharge medications have been reconciled for the patient.  Reviewed by: Jeffry Irby MD           Visit Vitals  /72   Pulse 84   Ht 162.6 cm (64\")   Wt 87.1 kg (192 lb)   LMP  (LMP Unknown)   SpO2 98%   BMI 32.96 kg/m²       Body mass index is 32.96 kg/m².            This document has been electronically signed by Jeffry Irby MD on January 20, 2020 9:53 AM    "

## 2020-01-21 ENCOUNTER — READMISSION MANAGEMENT (OUTPATIENT)
Dept: CALL CENTER | Facility: HOSPITAL | Age: 75
End: 2020-01-21

## 2020-01-21 NOTE — OUTREACH NOTE
General Surgery Week 2 Survey      Responses   Facility patient discharged from?  [place]   Does the patient have one of the following disease processes/diagnoses(primary or secondary)?  General Surgery   Week 2 attempt successful?  Yes   Call start time  1813   Call end time  1815   Discharge diagnosis  Adenomatous polyp   Meds reviewed with patient/caregiver?  Yes   Is the patient having any side effects they believe may be caused by any medication additions or changes?  No   Does the patient have all medications related to this admission filled (includes all antibiotics, pain medications, etc.)  Yes   Is the patient taking all medications as directed (includes completed medication regime)?  Yes   Does the patient have a follow up appointment scheduled with their surgeon?  Yes   Has the patient kept scheduled appointments due by today?  Yes   Psychosocial issues?  No   Did the patient receive a copy of their discharge instructions?  Yes   Nursing interventions  Reviewed instructions with patient   What is the patient's perception of their health status since discharge?  Same   Nursing interventions  Nurse provided patient education   Is the patient /caregiver able to teach back basic post-op care?  Lifting as instructed by MD in discharge instructions, No tub bath, swimming, or hot tub until instructed by MD, Continue use of incentive spirometry at least 1 week post discharge   Is the patient/caregiver able to teach back signs and symptoms of incisional infection?  Increased redness, swelling or pain at the incisonal site, Increased drainage or bleeding, Incisional warmth, Pus or odor from incision, Fever   Is the patient/caregiver able to teach back steps to recovery at home?  Set small, achievable goals for return to baseline health, Make a list of questions for surgeon's appointment, Eat a well-balance diet   Is the patient/caregiver able to teach back the hierarchy of who to call/visit for  symptoms/problems? PCP, Specialist, Home health nurse, Urgent Care, ED, 911  Yes   Week 2 call completed?  Yes          Brianne Ivory RN

## 2020-01-28 ENCOUNTER — READMISSION MANAGEMENT (OUTPATIENT)
Dept: CALL CENTER | Facility: HOSPITAL | Age: 75
End: 2020-01-28

## 2020-01-29 ENCOUNTER — OFFICE VISIT (OUTPATIENT)
Dept: OTOLARYNGOLOGY | Facility: CLINIC | Age: 75
End: 2020-01-29

## 2020-01-29 VITALS — HEIGHT: 64 IN | BODY MASS INDEX: 32.61 KG/M2 | HEART RATE: 70 BPM | OXYGEN SATURATION: 98 % | WEIGHT: 191 LBS

## 2020-01-29 DIAGNOSIS — J31.0 CHRONIC RHINITIS: Primary | ICD-10-CM

## 2020-01-29 PROCEDURE — 99213 OFFICE O/P EST LOW 20 MIN: CPT | Performed by: OTOLARYNGOLOGY

## 2020-01-29 RX ORDER — LANCETS 33 GAUGE
EACH MISCELLANEOUS
COMMUNITY
Start: 2020-01-20 | End: 2020-07-13

## 2020-01-29 NOTE — OUTREACH NOTE
General Surgery Week 3 Survey      Responses   Facility patient discharged from?  [place]   Does the patient have one of the following disease processes/diagnoses(primary or secondary)?  General Surgery   Week 3 attempt successful?  Yes   Call start time  1829   Meds reviewed with patient/caregiver?  Yes   Is the patient taking all medications as directed (includes completed medication regime)?  Yes   Has the patient kept scheduled appointments due by today?  Yes   Week 3 call completed?  Yes          Marlyn Johnson RN

## 2020-02-01 NOTE — PROGRESS NOTES
Subjective   Sharon Esposito is a 74 y.o. female.       History of Present Illness   Patient is followed with chronic rhinitis.  Uses Flonase every day.  Reports that she has had no significant infectious episodes since I saw her last but does seem to be having more postnasal drainage lately particularly in the last few weeks.  No purulent rhinorrhea.  Nothing in particular brought this on.      The following portions of the patient's history were reviewed and updated as appropriate: allergies, current medications, past family history, past medical history, past social history, past surgical history and problem list.     reports that she has never smoked. She has never used smokeless tobacco. She reports that she does not drink alcohol or use drugs.   Patient is not a tobacco user and has not been counseled for use of tobacco products      Review of Systems   Constitutional: Negative for fever.           Objective   Physical Exam  General: Well-developed well-nourished female in no acute distress.  Alert and oriented x-3.  Voice:Strong. Speech:Fluent  Ears: External ears no deformity, canals no discharge, tympanic membranes intact clear and mobile bilaterally.  Nose: Nares show no discharge mass polyp or purulence.  Boggy mucosa is present.  No gross external deformity.  Septum: Midline  Oral cavity: Lips and gums without lesions.  Tongue and floor of mouth without lesions.  Parotid and submandibular ducts unobstructed.  No mucosal lesions on the buccal mucosa or vestibule of the mouth.  Pharynx: No erythema exudate mass or ulcer  Neck: No lymphadenopathy.  No thyromegaly.  Trachea and larynx midline.  No masses in the parotid or submandibular glands.      Assessment/Plan   Sharon was seen today for follow-up.    Diagnoses and all orders for this visit:    Chronic rhinitis      Plan: Continue Flonase daily.  Add Mucinex 1 p.o. twice daily as needed for symptomatic postnasal drainage.  Use nasal saline spray  as needed as well.  If symptoms return to baseline return in 1 year but call sooner for problems.

## 2020-02-05 ENCOUNTER — READMISSION MANAGEMENT (OUTPATIENT)
Dept: CALL CENTER | Facility: HOSPITAL | Age: 75
End: 2020-02-05

## 2020-02-05 NOTE — OUTREACH NOTE
General Surgery Week 4 Survey      Responses   Facility patient discharged from?  [place]   Does the patient have one of the following disease processes/diagnoses(primary or secondary)?  General Surgery   Week 4 attempt successful?  No          Nupur Toney RN

## 2020-02-14 ENCOUNTER — OFFICE VISIT (OUTPATIENT)
Dept: SURGERY | Facility: CLINIC | Age: 75
End: 2020-02-14

## 2020-02-14 VITALS
WEIGHT: 186 LBS | TEMPERATURE: 98.1 F | OXYGEN SATURATION: 95 % | HEART RATE: 96 BPM | HEIGHT: 64 IN | DIASTOLIC BLOOD PRESSURE: 70 MMHG | SYSTOLIC BLOOD PRESSURE: 150 MMHG | BODY MASS INDEX: 31.76 KG/M2

## 2020-02-14 DIAGNOSIS — Z86.010 HX OF ADENOMATOUS COLONIC POLYPS: Primary | ICD-10-CM

## 2020-02-14 PROCEDURE — 99024 POSTOP FOLLOW-UP VISIT: CPT | Performed by: SURGERY

## 2020-02-14 NOTE — PROGRESS NOTES
74-year-old female who is now 1 month status post laparoscopic right colectomy for a tubular adenomatous polyp with moderate dysplasia.  Lesion was completely removed and 17 lymph nodes were negative.  Patient is doing well.  She is tolerating regular diet having normal bowel function.  No fever no chills no abdominal pain.  Incisions are all clean healing nicely she has good support in her abdominal wall.  Patient will follow-up with us in December to be set up for a follow-up colonoscopy or sooner if she has any other concerns or questions.

## 2020-02-14 NOTE — PATIENT INSTRUCTIONS

## 2020-02-18 RX ORDER — LOSARTAN POTASSIUM AND HYDROCHLOROTHIAZIDE 25; 100 MG/1; MG/1
1 TABLET ORAL DAILY
Qty: 90 TABLET | Refills: 2 | Status: SHIPPED | OUTPATIENT
Start: 2020-02-18 | End: 2020-11-02

## 2020-02-18 RX ORDER — ATORVASTATIN CALCIUM 40 MG/1
40 TABLET, FILM COATED ORAL DAILY
Qty: 90 TABLET | Refills: 2 | Status: SHIPPED | OUTPATIENT
Start: 2020-02-18 | End: 2020-11-02

## 2020-02-18 RX ORDER — LEVOTHYROXINE SODIUM 88 UG/1
88 TABLET ORAL DAILY
Qty: 90 TABLET | Refills: 2 | Status: SHIPPED | OUTPATIENT
Start: 2020-02-18 | End: 2020-11-02

## 2020-02-18 RX ORDER — METFORMIN HYDROCHLORIDE 500 MG/1
1000 TABLET, EXTENDED RELEASE ORAL
Qty: 180 TABLET | Refills: 3 | Status: SHIPPED | OUTPATIENT
Start: 2020-02-18 | End: 2021-02-01 | Stop reason: SDUPTHER

## 2020-02-18 RX ORDER — METFORMIN HYDROCHLORIDE 500 MG/1
1000 TABLET, EXTENDED RELEASE ORAL
Qty: 180 TABLET | Refills: 3 | Status: SHIPPED | OUTPATIENT
Start: 2020-02-18 | End: 2020-02-18 | Stop reason: SDUPTHER

## 2020-02-19 NOTE — TELEPHONE ENCOUNTER
Received fax from patients Mail order pharmacy [facility] for several medication refill.  Requested Refills were sent by Dr. Irby on 02/18/2020 for 90 day suppy and 3 refills  No refills were needed today

## 2020-02-26 ENCOUNTER — TELEPHONE (OUTPATIENT)
Dept: OBSTETRICS AND GYNECOLOGY | Facility: CLINIC | Age: 75
End: 2020-02-26

## 2020-04-13 ENCOUNTER — TELEPHONE (OUTPATIENT)
Dept: OBSTETRICS AND GYNECOLOGY | Facility: CLINIC | Age: 75
End: 2020-04-13

## 2020-04-13 NOTE — TELEPHONE ENCOUNTER
Patient left  about her appointment 04/14/2020..called the pt and told her that her appointments have been cancelled due to COVID-19    TOLD HER TO [place] AFTER IT IS OVER

## 2020-06-03 ENCOUNTER — OFFICE VISIT (OUTPATIENT)
Dept: OBSTETRICS AND GYNECOLOGY | Facility: CLINIC | Age: 75
End: 2020-06-03

## 2020-06-03 VITALS
SYSTOLIC BLOOD PRESSURE: 134 MMHG | BODY MASS INDEX: 32.61 KG/M2 | WEIGHT: 191 LBS | DIASTOLIC BLOOD PRESSURE: 80 MMHG | HEIGHT: 64 IN

## 2020-06-03 DIAGNOSIS — Z01.419 ENCOUNTER FOR GYNECOLOGICAL EXAMINATION WITHOUT ABNORMAL FINDING: Primary | ICD-10-CM

## 2020-06-03 DIAGNOSIS — Z12.31 ENCOUNTER FOR SCREENING MAMMOGRAM FOR MALIGNANT NEOPLASM OF BREAST: ICD-10-CM

## 2020-06-03 DIAGNOSIS — Z12.31 SCREENING MAMMOGRAM, ENCOUNTER FOR: Primary | ICD-10-CM

## 2020-06-03 PROCEDURE — G0101 CA SCREEN;PELVIC/BREAST EXAM: HCPCS | Performed by: NURSE PRACTITIONER

## 2020-06-03 NOTE — PROGRESS NOTES
"Subjective   Chief Complaint   Patient presents with   • Gynecologic Exam     Sharon Esposito is a 74 y.o. year old  presenting to for gyn annual and mammogram.      She is not sexually active.  In the past 12 months there has not been new sexual partners.  Condoms are not typically used.  She would not like to be screened for STD's at today's exam. She is not sexually active, but does have a friend.    She exercises regularly: not asked.  She wears her seat belt:yes.  She has concerns about domestic violence: no.  She has noticed changes in height: no.    The following portions of the patient's history were reviewed and updated as appropriate:problem list, current medications, allergies, past family history, past medical history, past social history and past surgical history.  Social History    Tobacco Use      Smoking status: Never Smoker      Smokeless tobacco: Never Used    Review of Systems   Constitutional: Negative for chills, fatigue, fever, unexpected weight gain and unexpected weight loss.   HENT: Negative for sneezing and sore throat.    Respiratory: Negative for shortness of breath.    Cardiovascular: Negative for chest pain and palpitations.   Gastrointestinal: Negative for abdominal pain, constipation, diarrhea and nausea.   Endocrine: Negative for cold intolerance and heat intolerance.   Genitourinary: Negative for amenorrhea, breast discharge, breast lump, breast pain, difficulty urinating, dysuria, frequency, menstrual problem, pelvic pain, pelvic pressure, urinary incontinence, vaginal bleeding, vaginal discharge and vaginal pain.   Skin: Negative for rash.   Neurological: Negative for weakness and headache.   Psychiatric/Behavioral: Negative for sleep disturbance, depressed mood and stress.        Objective   /80   Ht 162.6 cm (64\")   Wt 86.6 kg (191 lb)   LMP  (LMP Unknown)   BMI 32.79 kg/m²     General:  well developed; well nourished  no acute distress  appears stated age "   Skin:  No suspicious lesions seen  Mole(s) noted on bilateral breasts   Thyroid: normal to inspection and palpation   Breasts:  Examined in supine position  Symmetric without masses or skin dimpling  Nipples normal without inversion, lesions or discharge  There are no palpable axillary nodes  multiple light brown moles on both breasts   Abdomen: soft, non-tender; no masses  no umbilical or inguinal hernias are present  no hepato-splenomegaly   Pelvis: Clinical staff was present for exam  External genitalia:  normal appearance of the external genitalia including Bartholin's and [place]'s glands.  :  urethral meatus normal;  Vaginal:  normal pink mucosa without prolapse or lesions.  Cervix:  normal appearance.  Uterus:  normal size, shape and consistency.  Adnexa:  non palpable bilaterally.  Rectal:  digital rectal exam not performed; anus visually normal appearing. Pap test not indicated.      Lab Review   Pap smear-NIL 3/23/2016    Imaging  DEXA, Mammogram  Dexa-mild osteopenia 6/5/2019    Sharon was seen today for gynecologic exam.    Diagnoses and all orders for this visit:    Encounter for gynecological examination without abnormal finding    Encounter for screening mammogram for malignant neoplasm of breast      Counseled returning for any PMB and annual mammograms till 75 years of age or continue every year per pt's preference. Return in one year.       This note was electronically signed.

## 2020-07-07 ENCOUNTER — TELEPHONE (OUTPATIENT)
Dept: FAMILY MEDICINE CLINIC | Facility: CLINIC | Age: 75
End: 2020-07-07

## 2020-07-07 NOTE — TELEPHONE ENCOUNTER
Caller: Sharon Esposito    Relationship to patient: Self    Best call back number: [phone]    Patient is needing: PATIENT NEEDS TO SPEAK WITH YARON ABOUT HER METFORMIN BEING RECALLED.

## 2020-07-09 DIAGNOSIS — E11.9 DIABETES MELLITUS WITHOUT COMPLICATION (HCC): ICD-10-CM

## 2020-07-09 RX ORDER — BLOOD SUGAR DIAGNOSTIC
STRIP MISCELLANEOUS
Qty: 100 EACH | Refills: 3 | Status: SHIPPED | OUTPATIENT
Start: 2020-07-09 | End: 2020-11-10 | Stop reason: SDUPTHER

## 2020-07-09 NOTE — TELEPHONE ENCOUNTER
Spoke with patient and let her know that we received a letter stating that she needed to call [employer] and check the Lot # of her most recent Metformin prescription with the recalled Lot # and make sure her Rx was not part of the recall.  If it is, [facility] will replace her current medication.  Patient voiced understanding and will call [employer].

## 2020-07-13 RX ORDER — LANCETS 33 GAUGE
EACH MISCELLANEOUS
Qty: 100 EACH | Refills: 3 | Status: SHIPPED | OUTPATIENT
Start: 2020-07-13 | End: 2021-08-23 | Stop reason: SDUPTHER

## 2020-07-22 ENCOUNTER — OFFICE VISIT (OUTPATIENT)
Dept: FAMILY MEDICINE CLINIC | Facility: CLINIC | Age: 75
End: 2020-07-22

## 2020-07-22 VITALS
DIASTOLIC BLOOD PRESSURE: 64 MMHG | SYSTOLIC BLOOD PRESSURE: 134 MMHG | WEIGHT: 184 LBS | HEART RATE: 89 BPM | BODY MASS INDEX: 31.58 KG/M2

## 2020-07-22 DIAGNOSIS — I10 ESSENTIAL HYPERTENSION: ICD-10-CM

## 2020-07-22 DIAGNOSIS — E11.9 DIABETES MELLITUS WITHOUT COMPLICATION (HCC): Primary | ICD-10-CM

## 2020-07-22 DIAGNOSIS — E03.9 ACQUIRED HYPOTHYROIDISM: ICD-10-CM

## 2020-07-22 DIAGNOSIS — E78.00 PURE HYPERCHOLESTEROLEMIA: ICD-10-CM

## 2020-07-22 PROCEDURE — 99443 PR PHYS/QHP TELEPHONE EVALUATION 21-30 MIN: CPT | Performed by: FAMILY MEDICINE

## 2020-07-22 NOTE — PROGRESS NOTES
Subjective   Sharon Esposito is a 74 y.o. female.     Hypertension   This is a chronic problem. The current episode started more than 1 year ago. The problem is unchanged. The problem is controlled (pt checks occisionally at [facility] pharmacy, runs [id number]). Pertinent negatives include no chest pain, orthopnea, palpitations, peripheral edema, PND or shortness of breath.   Hyperlipidemia   This is a chronic problem. The current episode started more than 1 year ago. The problem is controlled. Recent lipid tests were reviewed and are normal. Exacerbating diseases include hypothyroidism and obesity. Pertinent negatives include no chest pain, myalgias or shortness of breath.   Diabetes   She presents for her initial diabetic visit. She has type 2 diabetes mellitus. Pertinent negatives for diabetes include no chest pain, no foot paresthesias, no foot ulcerations and no polyuria. Her breakfast blood glucose is taken between 8-9 am. Her breakfast blood glucose range is generally 110-130 mg/dl. (Glu 108) An ACE inhibitor/angiotensin II receptor blocker is being taken.   Hypothyroidism   This is a chronic problem. The current episode started more than 1 year ago. The problem has been unchanged. Pertinent negatives include no chest pain or myalgias.        The following portions of the patient's history were reviewed and updated as appropriate: allergies, current medications, past family history, past medical history, past social history, past surgical history and problem list.    Review of Systems   Respiratory: Negative for shortness of breath.    Cardiovascular: Negative for chest pain, palpitations, orthopnea and PND.   Endocrine: Negative for polyuria.   Musculoskeletal: Negative for myalgias.       Objective   Physical Exam   Constitutional: She is oriented to person, place, and time. No distress.   Neurological: She is alert and oriented to person, place, and time.   Psychiatric: She has a normal mood and affect.  Her behavior is normal. Judgment and thought content normal.   Vitals reviewed.      Assessment/Plan   Problems Addressed this Visit        Cardiovascular and Mediastinum    Hyperlipidemia    Essential hypertension       Endocrine    Acquired hypothyroidism      Other Visit Diagnoses     Diabetes mellitus without complication (CMS/[facility])    -  Primary            You have chosen to receive care through a telephone visit. Do you consent to use a telephone visit for your medical care today? Yes   This visit has been rescheduled as a phone visit to comply with patient safety concerns in accordance with CDC recommendations. Total time of discussion was 21 minutes.               Visit Vitals  /64   Pulse 89   Wt 83.5 kg (184 lb)   LMP  (LMP Unknown)   BMI 31.58 kg/m²       Body mass index is 31.58 kg/m².            This document has been electronically signed by Jeffry Irby MD on July 22, 2020 09:06

## 2020-11-02 RX ORDER — LOSARTAN POTASSIUM AND HYDROCHLOROTHIAZIDE 25; 100 MG/1; MG/1
TABLET ORAL
Qty: 90 TABLET | Refills: 0 | Status: SHIPPED | OUTPATIENT
Start: 2020-11-02 | End: 2020-11-10 | Stop reason: SDUPTHER

## 2020-11-02 RX ORDER — LEVOTHYROXINE SODIUM 88 UG/1
TABLET ORAL
Qty: 90 TABLET | Refills: 0 | Status: SHIPPED | OUTPATIENT
Start: 2020-11-02 | End: 2020-11-10 | Stop reason: SDUPTHER

## 2020-11-02 RX ORDER — ATORVASTATIN CALCIUM 40 MG/1
TABLET, FILM COATED ORAL
Qty: 90 TABLET | Refills: 0 | Status: SHIPPED | OUTPATIENT
Start: 2020-11-02 | End: 2020-11-10 | Stop reason: SDUPTHER

## 2020-11-10 ENCOUNTER — TELEPHONE (OUTPATIENT)
Dept: FAMILY MEDICINE CLINIC | Facility: CLINIC | Age: 75
End: 2020-11-10

## 2020-11-10 DIAGNOSIS — E11.9 DIABETES MELLITUS WITHOUT COMPLICATION (HCC): ICD-10-CM

## 2020-11-10 RX ORDER — BLOOD SUGAR DIAGNOSTIC
STRIP MISCELLANEOUS
Qty: 100 EACH | Refills: 0 | Status: SHIPPED | OUTPATIENT
Start: 2020-11-10 | End: 2021-08-23 | Stop reason: SDUPTHER

## 2020-11-10 RX ORDER — ATORVASTATIN CALCIUM 40 MG/1
40 TABLET, FILM COATED ORAL DAILY
Qty: 90 TABLET | Refills: 0 | Status: SHIPPED | OUTPATIENT
Start: 2020-11-10 | End: 2021-04-30 | Stop reason: SDUPTHER

## 2020-11-10 RX ORDER — LOSARTAN POTASSIUM AND HYDROCHLOROTHIAZIDE 25; 100 MG/1; MG/1
1 TABLET ORAL DAILY
Qty: 90 TABLET | Refills: 0 | Status: SHIPPED | OUTPATIENT
Start: 2020-11-10 | End: 2021-04-30 | Stop reason: SDUPTHER

## 2020-11-10 RX ORDER — LEVOTHYROXINE SODIUM 88 UG/1
88 TABLET ORAL DAILY
Qty: 90 TABLET | Refills: 0 | Status: SHIPPED | OUTPATIENT
Start: 2020-11-10 | End: 2021-04-30 | Stop reason: SDUPTHER

## 2020-11-10 NOTE — TELEPHONE ENCOUNTER
Caller: Sharon Esposito    Relationship: Self    Best call back number: [phone]     Medication needed:   Requested Prescriptions     Pending Prescriptions Disp Refills   • atorvastatin (LIPITOR) 40 MG tablet 90 tablet 0     Sig: Take 1 tablet by mouth Daily.   • levothyroxine (SYNTHROID, LEVOTHROID) 88 MCG tablet 90 tablet 0     Sig: Take 1 tablet by mouth Daily.   • losartan-hydrochlorothiazide (HYZAAR) 100-25 MG per tablet 90 tablet 0     Sig: Take 1 tablet by mouth Daily.   • metoprolol tartrate (LOPRESSOR) 25 MG tablet 90 tablet 0     Sig: Take 0.5 tablets by mouth 2 (Two) Times a Day.   • glucose blood (OneTouch Ultra) test strip 100 each 3     Sig: Use as instructed       When do you need the refill by: 11/10/2020    What details did the patient provide when requesting the medication:     Does the patient have less than a 3 day supply:  [] Yes  [x] No    What is the patient's preferred pharmacy: [facility] - [place], [place] - [address] AT PORTAL TO REGISTERED [facility] - [phone]  - [phone] FX

## 2020-12-09 ENCOUNTER — LAB (OUTPATIENT)
Dept: LAB | Facility: HOSPITAL | Age: 75
End: 2020-12-09

## 2020-12-09 ENCOUNTER — TELEPHONE (OUTPATIENT)
Dept: FAMILY MEDICINE CLINIC | Facility: CLINIC | Age: 75
End: 2020-12-09

## 2020-12-09 ENCOUNTER — OFFICE VISIT (OUTPATIENT)
Dept: FAMILY MEDICINE CLINIC | Facility: CLINIC | Age: 75
End: 2020-12-09

## 2020-12-09 VITALS — BODY MASS INDEX: 31.41 KG/M2 | HEIGHT: 64 IN | WEIGHT: 184 LBS

## 2020-12-09 DIAGNOSIS — I10 ESSENTIAL HYPERTENSION: ICD-10-CM

## 2020-12-09 DIAGNOSIS — E78.00 PURE HYPERCHOLESTEROLEMIA: ICD-10-CM

## 2020-12-09 DIAGNOSIS — Z00.00 MEDICARE ANNUAL WELLNESS VISIT, SUBSEQUENT: ICD-10-CM

## 2020-12-09 DIAGNOSIS — E53.8 COBALAMIN DEFICIENCY: ICD-10-CM

## 2020-12-09 DIAGNOSIS — E11.9 DIABETES MELLITUS WITHOUT COMPLICATION (HCC): Primary | ICD-10-CM

## 2020-12-09 DIAGNOSIS — E03.9 ACQUIRED HYPOTHYROIDISM: ICD-10-CM

## 2020-12-09 LAB
ALBUMIN SERPL-MCNC: 4.2 G/DL (ref 3.5–5.2)
ALBUMIN UR-MCNC: <1.2 MG/DL
ALBUMIN/GLOB SERPL: 1.3 G/DL
ALP SERPL-CCNC: 102 U/L (ref 39–117)
ALT SERPL W P-5'-P-CCNC: 19 U/L (ref 1–33)
ANION GAP SERPL CALCULATED.3IONS-SCNC: 12.3 MMOL/L (ref 5–15)
AST SERPL-CCNC: 20 U/L (ref 1–32)
BILIRUB SERPL-MCNC: 0.4 MG/DL (ref 0–1.2)
BUN SERPL-MCNC: 9 MG/DL (ref 8–23)
BUN/CREAT SERPL: 12.7 (ref 7–25)
CALCIUM SPEC-SCNC: 9.3 MG/DL (ref 8.6–10.5)
CHLORIDE SERPL-SCNC: 99 MMOL/L (ref 98–107)
CHOLEST SERPL-MCNC: 168 MG/DL (ref 0–200)
CO2 SERPL-SCNC: 25.7 MMOL/L (ref 22–29)
CREAT SERPL-MCNC: 0.71 MG/DL (ref 0.57–1)
CREAT UR-MCNC: 46.7 MG/DL
GFR SERPL CREATININE-BSD FRML MDRD: 81 ML/MIN/1.73
GLOBULIN UR ELPH-MCNC: 3.3 GM/DL
GLUCOSE SERPL-MCNC: 93 MG/DL (ref 65–99)
HBA1C MFR BLD: 6.53 % (ref 4.8–5.6)
HDLC SERPL-MCNC: 71 MG/DL (ref 40–60)
LDLC SERPL CALC-MCNC: 61 MG/DL (ref 0–100)
LDLC/HDLC SERPL: 0.72 {RATIO}
MICROALBUMIN/CREAT UR: NORMAL MG/G{CREAT}
POTASSIUM SERPL-SCNC: 4.1 MMOL/L (ref 3.5–5.2)
PROT SERPL-MCNC: 7.5 G/DL (ref 6–8.5)
SODIUM SERPL-SCNC: 137 MMOL/L (ref 136–145)
T4 FREE SERPL-MCNC: 1.36 NG/DL (ref 0.93–1.7)
TRIGL SERPL-MCNC: 231 MG/DL (ref 0–150)
TSH SERPL DL<=0.05 MIU/L-ACNC: 4.21 UIU/ML (ref 0.27–4.2)
VIT B12 BLD-MCNC: 1018 PG/ML (ref 211–946)
VLDLC SERPL-MCNC: 36 MG/DL (ref 5–40)

## 2020-12-09 PROCEDURE — 36415 COLL VENOUS BLD VENIPUNCTURE: CPT | Performed by: FAMILY MEDICINE

## 2020-12-09 PROCEDURE — G0439 PPPS, SUBSEQ VISIT: HCPCS | Performed by: FAMILY MEDICINE

## 2020-12-09 PROCEDURE — 83036 HEMOGLOBIN GLYCOSYLATED A1C: CPT | Performed by: FAMILY MEDICINE

## 2020-12-09 PROCEDURE — 82607 VITAMIN B-12: CPT | Performed by: FAMILY MEDICINE

## 2020-12-09 PROCEDURE — 84439 ASSAY OF FREE THYROXINE: CPT | Performed by: FAMILY MEDICINE

## 2020-12-09 PROCEDURE — 99214 OFFICE O/P EST MOD 30 MIN: CPT | Performed by: FAMILY MEDICINE

## 2020-12-09 PROCEDURE — 80061 LIPID PANEL: CPT | Performed by: FAMILY MEDICINE

## 2020-12-09 PROCEDURE — 80053 COMPREHEN METABOLIC PANEL: CPT | Performed by: FAMILY MEDICINE

## 2020-12-09 PROCEDURE — 82043 UR ALBUMIN QUANTITATIVE: CPT | Performed by: FAMILY MEDICINE

## 2020-12-09 PROCEDURE — 82570 ASSAY OF URINE CREATININE: CPT | Performed by: FAMILY MEDICINE

## 2020-12-09 PROCEDURE — 84443 ASSAY THYROID STIM HORMONE: CPT | Performed by: FAMILY MEDICINE

## 2020-12-09 NOTE — TELEPHONE ENCOUNTER
All medications have been sent with appropriate refills to mail order pharmacy and test strips with refills to [facility].

## 2020-12-09 NOTE — TELEPHONE ENCOUNTER
Sharon said, she forgot to tell Dr. Irby that she needs all her rx's sent in to [facility]ial order except for the test strips and [facility].

## 2020-12-09 NOTE — PROGRESS NOTES
Subjective   Sharon Esposito is a 75 y.o. female.     Hypertension  This is a chronic problem. The current episode started more than 1 year ago. The problem is unchanged. The problem is controlled (pt checks occisionally at [facility] pharmacy, runs [id number]). Pertinent negatives include no chest pain, orthopnea, palpitations, peripheral edema, PND or shortness of breath.   Hyperlipidemia  This is a chronic problem. The current episode started more than 1 year ago. The problem is controlled. Recent lipid tests were reviewed and are normal. Exacerbating diseases include hypothyroidism and obesity. Pertinent negatives include no chest pain, myalgias or shortness of breath.   Diabetes  She presents for her initial diabetic visit. She has type 2 diabetes mellitus. Pertinent negatives for diabetes include no chest pain and no foot paresthesias. Her breakfast blood glucose is taken between 8-9 am. Her breakfast blood glucose range is generally 110-130 mg/dl. (Glu 108) An ACE inhibitor/angiotensin II receptor blocker is being taken.   Hypothyroidism  This is a chronic problem. The current episode started more than 1 year ago. The problem has been unchanged. Pertinent negatives include no chest pain or myalgias.        The following portions of the patient's history were reviewed and updated as appropriate: allergies, current medications, past family history, past medical history, past social history, past surgical history and problem list.    Review of Systems   Respiratory: Negative for shortness of breath.    Cardiovascular: Negative for chest pain, palpitations, orthopnea and PND.   Musculoskeletal: Negative for myalgias.       Objective   Physical Exam   Constitutional: She is oriented to person, place, and time. No distress.   Neurological: She is alert and oriented to person, place, and time.   Psychiatric: Her behavior is normal. Judgment and thought content normal.   Vitals reviewed.      Assessment/Plan    "  Problems Addressed this Visit        Cardiovascular and Mediastinum    Hyperlipidemia    Relevant Orders    Lipid Panel    Essential hypertension    Relevant Orders    Comprehensive Metabolic Panel       Digestive    Cobalamin deficiency    Relevant Orders    Vitamin B12       Endocrine    Acquired hypothyroidism    Relevant Orders    T4, Free    TSH      Other Visit Diagnoses     Diabetes mellitus without complication (CMS/[facility])    -  Primary    Relevant Orders    Hemoglobin A1c    Microalbumin / Creatinine Urine Ratio - Urine, Clean Catch    Medicare annual wellness visit, subsequent          Diagnoses       Codes Comments    Diabetes mellitus without complication (CMS/[facility])    -  Primary ICD-10-CM: E11.9  ICD-9-CM: 250.00     Essential hypertension     ICD-10-CM: I10  ICD-9-CM: 401.9     Pure hypercholesterolemia     ICD-10-CM: E78.00  ICD-9-CM: 272.0     Acquired hypothyroidism     ICD-10-CM: E03.9  ICD-9-CM: 244.9     Cobalamin deficiency     ICD-10-CM: E53.8  ICD-9-CM: 266.2     Medicare annual wellness visit, subsequent     ICD-10-CM: Z00.00  ICD-9-CM: V70.0         I attest all information history review of systems is accurate.    For hypertension continue losartan hydrochlorothiazide, metoprolol, get a CMP.    For diabetes continue Metformin get hemoglobin A1c and urine for microalbuminuria.    Hyperlipidemia continue atorvastatin check lipid profile.    For hypothyroidism continue Levothroid check free T4 and TSH.    Vitamin D deficiency check B12 and continue B12 over-the-counter 1000 mcg/day.    You have chosen to receive care through a telephone visit. Do you consent to use a telephone visit for your medical care today? Yes   This visit has been rescheduled as a phone visit to comply with patient safety concerns in accordance with CDC recommendations. Total time of discussion was 20 minutes.               Visit Vitals  Ht 162.6 cm (64\")   Wt 83.5 kg (184 lb)   LMP  (LMP Unknown)   BMI 31.58 kg/m² "       Body mass index is 31.58 kg/m².            This document has been electronically signed by Jeffry Irby MD on December 9, 2020 08:40 CST

## 2020-12-09 NOTE — PATIENT INSTRUCTIONS
Medicare Wellness  Personal Prevention Plan of Service     Date of Office Visit:  2020  Encounter Provider:  Jeffry Irby MD  Place of Service:  [facility]  Patient Name: Sharon Esposito  :  1945    As part of the Medicare Wellness portion of your visit today, we are providing you with this personalized preventive plan of services (PPPS). This plan is based upon recommendations of the [place] Preventive Services Task Force (USPSTF) and the Advisory Committee on Immunization Practices (ACIP).    This lists the preventive care services that should be considered, and provides dates of when you are due. Items listed as completed are up-to-date and do not require any further intervention.    Health Maintenance   Topic Date Due   • ZOSTER VACCINE (2 of 2) 2017   • HEMOGLOBIN A1C  2020   • ANNUAL WELLNESS VISIT  2020   • LIPID PANEL  2020   • URINE MICROALBUMIN  2020   • INFLUENZA VACCINE  2021 (Originally 2020)   • DIABETIC FOOT EXAM  2021   • DIABETIC EYE EXAM  2021   • DXA SCAN  2021   • TDAP/TD VACCINES (2 - Td) 2021   • MAMMOGRAM  2022   • COLONOSCOPY  2030   • HEPATITIS C SCREENING  Completed   • Pneumococcal Vaccine 65+  Completed       Orders Placed This Encounter   Procedures   • Hemoglobin A1c   • Microalbumin / Creatinine Urine Ratio - Urine, Clean Catch   • Comprehensive Metabolic Panel   • Lipid Panel   • T4, Free   • TSH   • Vitamin B12       Return in about 6 months (around 2021), or Dr Hardin.          Calorie Counting for Weight Loss  Calories are units of energy. Your body needs a certain amount of calories from food to keep you going throughout the day. When you eat more calories than your body needs, your body stores the extra calories as fat. When you eat fewer calories than your body needs, your body burns fat to get the energy it needs.  Calorie counting  means keeping track of how many calories you eat and drink each day. Calorie counting can be helpful if you need to lose weight. If you make sure to eat fewer calories than your body needs, you should lose weight. Ask your health care provider what a healthy weight is for you.  For calorie counting to work, you will need to eat the right number of calories in a day in order to lose a healthy amount of weight per week. A dietitian can help you determine how many calories you need in a day and will give you suggestions on how to reach your calorie goal.  · A healthy amount of weight to lose per week is usually 1-2 lb (0.5-0.9 kg). This usually means that your daily calorie intake should be reduced by 500-750 calories.  · Eating 1,200 - 1,500 calories per day can help most women lose weight.  · Eating 1,500 - 1,800 calories per day can help most men lose weight.  What is my plan?  My goal is to have __________ calories per day.  If I have this many calories per day, I should lose around __________ pounds per week.  What do I need to know about calorie counting?  In order to meet your daily calorie goal, you will need to:  · Find out how many calories are in each food you would like to eat. Try to do this before you eat.  · Decide how much of the food you plan to eat.  · Write down what you ate and how many calories it had. Doing this is called keeping a food log.  To successfully lose weight, it is important to balance calorie counting with a healthy lifestyle that includes regular activity. Aim for 150 minutes of moderate exercise (such as walking) or 75 minutes of vigorous exercise (such as running) each week.  Where do I find calorie information?    The number of calories in a food can be found on a Nutrition Facts label. If a food does not have a Nutrition Facts label, try to look up the calories online or ask your dietitian for help.  Remember that calories are listed per serving. If you choose to have more than  one serving of a food, you will have to multiply the calories per serving by the amount of servings you plan to eat. For example, the label on a package of bread might say that a serving size is 1 slice and that there are 90 calories in a serving. If you eat 1 slice, you will have eaten 90 calories. If you eat 2 slices, you will have eaten 180 calories.  How do I keep a food log?  Immediately after each meal, record the following information in your food log:  · What you ate. Don't forget to include toppings, sauces, and other extras on the food.  · How much you ate. This can be measured in cups, ounces, or number of items.  · How many calories each food and drink had.  · The total number of calories in the meal.  Keep your food log near you, such as in a small notebook in your pocket, or use a mobile singh or website. Some programs will calculate calories for you and show you how many calories you have left for the day to meet your goal.  What are some calorie counting tips?    · Use your calories on foods and drinks that will fill you up and not leave you hungry:  ? Some examples of foods that fill you up are nuts and nut butters, vegetables, lean proteins, and high-fiber foods like whole grains. High-fiber foods are foods with more than 5 g fiber per serving.  ? Drinks such as sodas, specialty coffee drinks, alcohol, and juices have a lot of calories, yet do not fill you up.  · Eat nutritious foods and avoid empty calories. Empty calories are calories you get from foods or beverages that do not have many vitamins or protein, such as candy, sweets, and soda. It is better to have a nutritious high-calorie food (such as an avocado) than a food with few nutrients (such as a bag of chips).  · Know how many calories are in the foods you eat most often. This will help you calculate calorie counts faster.  · Pay attention to calories in drinks. Low-calorie drinks include water and unsweetened drinks.  · Pay attention to  "nutrition labels for \"low fat\" or \"fat free\" foods. These foods sometimes have the same amount of calories or more calories than the full fat versions. They also often have added sugar, starch, or salt, to make up for flavor that was removed with the fat.  · Find a way of tracking calories that works for you. Get creative. Try different apps or programs if writing down calories does not work for you.  What are some portion control tips?  · Know how many calories are in a serving. This will help you know how many servings of a certain food you can have.  · Use a measuring cup to measure serving sizes. You could also try weighing out portions on a kitchen scale. With time, you will be able to estimate serving sizes for some foods.  · Take some time to put servings of different foods on your favorite plates, bowls, and cups so you know what a serving looks like.  · Try not to eat straight from a bag or box. Doing this can lead to overeating. Put the amount you would like to eat in a cup or on a plate to make sure you are eating the right portion.  · Use smaller plates, glasses, and bowls to prevent overeating.  · Try not to multitask (for example, watch TV or use your computer) while eating. If it is time to eat, sit down at a table and enjoy your food. This will help you to know when you are full. It will also help you to be aware of what you are eating and how much you are eating.  What are tips for following this plan?  Reading food labels  · Check the calorie count compared to the serving size. The serving size may be smaller than what you are used to eating.  · Check the source of the calories. Make sure the food you are eating is high in vitamins and protein and low in saturated and trans fats.  Shopping  · Read nutrition labels while you shop. This will help you make healthy decisions before you decide to purchase your food.  · Make a grocery list and stick to it.  Cooking  · Try to cook your favorite foods in a " "healthier way. For example, try baking instead of frying.  · Use low-fat dairy products.  Meal planning  · Use more fruits and vegetables. Half of your plate should be fruits and vegetables.  · Include lean proteins like poultry and fish.  How do I count calories when eating out?  · Ask for smaller portion sizes.  · Consider sharing an entree and sides instead of getting your own entree.  · If you get your own entree, eat only half. Ask for a box at the beginning of your meal and put the rest of your entree in it so you are not tempted to eat it.  · If calories are listed on the menu, choose the lower calorie options.  · Choose dishes that include vegetables, fruits, whole grains, low-fat dairy products, and lean protein.  · Choose items that are boiled, broiled, grilled, or steamed. Stay away from items that are buttered, battered, fried, or served with cream sauce. Items labeled \"crispy\" are usually fried, unless stated otherwise.  · Choose water, low-fat milk, unsweetened iced tea, or other drinks without added sugar. If you want an alcoholic beverage, choose a lower calorie option such as a glass of wine or light beer.  · Ask for dressings, sauces, and syrups on the side. These are usually high in calories, so you should limit the amount you eat.  · If you want a salad, choose a garden salad and ask for grilled meats. Avoid extra toppings like hawk, cheese, or fried items. Ask for the dressing on the side, or ask for olive oil and vinegar or lemon to use as dressing.  · Estimate how many servings of a food you are given. For example, a serving of cooked rice is ½ cup or about the size of half a baseball. Knowing serving sizes will help you be aware of how much food you are eating at restaurants. The list below tells you how big or small some common portion sizes are based on everyday objects:  ? 1 oz--4 stacked dice.  ? 3 oz--1 deck of cards.  ? 1 tsp--1 die.  ? 1 Tbsp--½ a ping-pong ball.  ? 2 Tbsp--1 " ping-pong ball.  ? ½ cup--½ baseball.  ? 1 cup--1 baseball.  Summary  · Calorie counting means keeping track of how many calories you eat and drink each day. If you eat fewer calories than your body needs, you should lose weight.  · A healthy amount of weight to lose per week is usually 1-2 lb (0.5-0.9 kg). This usually means reducing your daily calorie intake by 500-750 calories.  · The number of calories in a food can be found on a Nutrition Facts label. If a food does not have a Nutrition Facts label, try to look up the calories online or ask your dietitian for help.  · Use your calories on foods and drinks that will fill you up, and not on foods and drinks that will leave you hungry.  · Use smaller plates, glasses, and bowls to prevent overeating.  This information is not intended to replace advice given to you by your health care provider. Make sure you discuss any questions you have with your health care provider.  Document Revised: 09/06/2019 Document Reviewed: 11/17/2017  Elsevier Patient Education © 2020 [employer]      Advance Care Planning and Advance Directives     You make decisions on a daily basis - decisions about where you want to live, your career, your home, your life. Perhaps one of the most important decisions you face is your choice for future medical care. Take time to talk with your family and your healthcare team and start planning today.  Advance Care Planning is a process that can help you:  · Understand possible future healthcare decisions in light of your own experiences  · Reflect on those decision in light of your goals and values  · Discuss your decisions with those closest to you and the healthcare professionals that care for you  · Make a plan by creating a document that reflects your wishes    Surrogate Decision Maker  In the event of a medical emergency, which has left you unable to communicate or to make your own decisions, you would need someone to make decisions for you.  It  is important to discuss your preferences for medical treatment with this person while you are in good health.     Qualities of a surrogate decision maker:  • Willing to take on this role and responsibility  • Knows what you want for future medical care  • Willing to follow your wishes even if they don't agree with them  • Able to make difficult medical decisions under stressful circumstances    Advance Directives  These are legal documents you can create that will guide your healthcare team and decision maker(s) when needed. These documents can be stored in the electronic medical record.    · Living Will - a legal document to guide your care if you have a terminal condition or a serious illness and are unable to communicate. States vary by statute in document names/types, but most forms may include one or more of the following:        -  Directions regarding life-prolonging treatments        -  Directions regarding artificially provided nutrition/hydration        -  Choosing a healthcare decision maker        -  Direction regarding organ/tissue donation    · Durable Power of  for Healthcare - this document names an -in-fact to make medical decisions for you, but it may also allow this person to make personal and financial decisions for you. Please seek the advice of an  if you need this type of document.    **Advance Directives are not required and no one may discriminate against you if you do not sign one.    Medical Orders  Many states allow specific forms/orders signed by your physician to record your wishes for medical treatment in your current state of health. This form, signed in personal communication with your physician, addresses resuscitation and other medical interventions that you may or may not want.      For more information or to schedule a time with a [facility] Advance Care Planning Facilitator contact: [facility] or call [phone] and someone will contact  you directly.

## 2020-12-09 NOTE — PROGRESS NOTES
The ABCs of the Annual Wellness Visit  Subsequent Medicare Wellness Visit    Chief Complaint   Patient presents with   • Annual Exam     M[facility]       Subjective   History of Present Illness:  Sharon Esposito is a 75 y.o. female who presents for a Subsequent Medicare Wellness Visit.    HEALTH RISK ASSESSMENT    Recent Hospitalizations:  No hospitalization(s) within the last year.    Current Medical Providers:  Patient Care Team:  Jeffry Irby MD as PCP - General  Laura Robles APRN (Gastroenterology)  Ozzy Young MD as Surgeon (General Surgery)    Smoking Status:  Social History     Tobacco Use   Smoking Status Never Smoker   Smokeless Tobacco Never Used       Alcohol Consumption:  Social History     Substance and Sexual Activity   Alcohol Use No       Depression Screen:   PHQ-2/PHQ-9 Depression Screening 12/4/2019   Little interest or pleasure in doing things 0   Feeling down, depressed, or hopeless 0   Trouble falling or staying asleep, or sleeping too much 0   Feeling tired or having little energy 0   Poor appetite or overeating 0   Feeling bad about yourself - or that you are a failure or have let yourself or your family down 0   Trouble concentrating on things, such as reading the newspaper or watching television 0   Moving or speaking so slowly that other people could have noticed. Or the opposite - being so fidgety or restless that you have been moving around a lot more than usual 0   Thoughts that you would be better off dead, or of hurting yourself in some way 0   Total Score 0   If you checked off any problems, how difficult have these problems made it for you to do your work, take care of things at home, or get along with other people? Not difficult at all       Fall Risk Screen:  STEADI Fall Risk Assessment has not been completed.    Health Habits and Functional and Cognitive Screening:  Functional & Cognitive Status 12/4/2019   Do you have difficulty preparing food and eating? No   Do you  have difficulty bathing yourself, getting dressed or grooming yourself? No   Do you have difficulty using the toilet? No   Do you have difficulty moving around from place to place? No   Do you have trouble with steps or getting out of a bed or a chair? No   Current Diet Well Balanced Diet   Dental Exam Up to date   Eye Exam Up to date   Exercise (times per week) 5 times per week   Current Exercise Activities Include Walking   Do you need help using the phone?  No   Are you deaf or do you have serious difficulty hearing?  No   Do you need help with transportation? No   Do you need help shopping? No   Do you need help preparing meals?  No   Do you need help with housework?  No   Do you need help with laundry? No   Do you need help taking your medications? No   Do you need help managing money? No   Do you ever drive or ride in a car without wearing a seat belt? No   Have you felt unusual stress, anger or loneliness in the last month? No   Who do you live with? Alone   If you need help, do you have trouble finding someone available to you? No   Have you been bothered in the last four weeks by sexual problems? No   Do you have difficulty concentrating, remembering or making decisions? No         Does the patient have evidence of cognitive impairment? No    Asprin use counseling:Does not need ASA (and currently is not on it)    Age-appropriate Screening Schedule:  Refer to the list below for future screening recommendations based on patient's age, sex and/or medical conditions. Orders for these recommended tests are listed in the plan section. The patient has been provided with a written plan.    Health Maintenance   Topic Date Due   • ZOSTER VACCINE (2 of 2) 01/17/2017   • HEMOGLOBIN A1C  06/04/2020   • INFLUENZA VACCINE  08/01/2020   • LIPID PANEL  12/04/2020   • URINE MICROALBUMIN  12/04/2020   • DIABETIC FOOT EXAM  01/20/2021   • DIABETIC EYE EXAM  03/02/2021   • DXA SCAN  06/05/2021   • TDAP/TD VACCINES (2 - Td)  09/19/2021   • MAMMOGRAM  06/03/2022   • COLONOSCOPY  01/09/2030          The following portions of the patient's history were reviewed and updated as appropriate: allergies, current medications, past family history, past medical history, past social history, past surgical history and problem list.    Outpatient Medications Prior to Visit   Medication Sig Dispense Refill   • atorvastatin (LIPITOR) 40 MG tablet Take 1 tablet by mouth Daily. 90 tablet 0   • Blood Glucose Monitoring Suppl (ONE TOUCH ULTRA SYSTEM KIT) W/DEVICE kit Use for Home Glucose Monitoring 1 each 0   • Cyanocobalamin (VITAMIN B-12) 1000 MCG sublingual tablet Place 1 tablet under your tongue daily for the rest of your life. (Patient taking differently: Place 1 tablet under the tongue Daily. Place 1 tablet under your tongue daily for the rest of your life.) 100 each 4   • fluticasone (FLONASE) 50 MCG/ACT nasal spray 2 sprays into the nostril(s) as directed by provider Daily. 3 bottle 3   • glucose blood (OneTouch Ultra) test strip Test Blood Sugars Once Daily As Instructed By Your Provider 100 each 0   • guaiFENesin (MUCINEX) 600 MG 12 hr tablet Take 1 tablet by mouth 2 (Two) Times a Day. 30 tablet 0   • Lancets (ONETOUCH DELICA PLUS KGBEDX93C) misc USE TO TEST BLOOD SUGAR ONCE DAILY E11.9 100 each 3   • levothyroxine (SYNTHROID, LEVOTHROID) 88 MCG tablet Take 1 tablet by mouth Daily. 90 tablet 0   • Loratadine 10 MG capsule Take 1 tablet by mouth Daily As Needed.     • losartan-hydrochlorothiazide (HYZAAR) 100-25 MG per tablet Take 1 tablet by mouth Daily. 90 tablet 0   • metFORMIN ER (GLUCOPHAGE-XR) 500 MG 24 hr tablet Take 2 tablets by mouth Daily Before Supper. 180 tablet 3   • metoprolol tartrate (LOPRESSOR) 25 MG tablet Take 0.5 tablets by mouth 2 (Two) Times a Day. 90 tablet 0   • vitamin C (ASCORBIC ACID) 500 MG tablet Take 500 mg by mouth Daily.       No facility-administered medications prior to visit.        Patient Active Problem List  "  Diagnosis   • Need for hepatitis C screening test   • Hyperlipidemia   • Acquired hypothyroidism   • Seasonal allergies   • Osteopenia   • Essential hypertension   • Edema   • Diverticulitis of colon   • Allergic rhinitis   • Postmenopausal   • Ingrown toenail   • Paresthesia of both feet   • Type 2 diabetes mellitus without complication, without long-term current use of insulin (CMS/HCC)   • Cobalamin deficiency   • Diabetic peripheral neuropathy (CMS/HCC)   • Encounter for screening for malignant neoplasm of colon   • Villous adenoma of colon 08/2019   • Adenomatous polyp   • Hx of adenomatous colonic polyps       Advanced Care Planning:  ACP discussion was held with the patient during this visit. Patient does not have an advance directive, information provided.    Review of Systems    Compared to one year ago, the patient feels her physical health is the same.  Compared to one year ago, the patient feels her mental health is the same.    Reviewed chart for potential of high risk medication in the elderly: yes  Reviewed chart for potential of harmful drug interactions in the elderly:yes    Objective         Vitals:    12/09/20 0825   Weight: 83.5 kg (184 lb)   Height: 162.6 cm (64\")       Body mass index is 31.58 kg/m².  Discussed the patient's BMI with her. The BMI is above average; BMI management plan is completed.    Physical Exam          Assessment/Plan   Medicare Risks and Personalized Health Plan  CMS Preventative Services Quick Reference  Advance Directive Discussion    The above risks/problems have been discussed with the patient.  Pertinent information has been shared with the patient in the After Visit Summary.  Follow up plans and orders are seen below in the Assessment/Plan Section.    There are no diagnoses linked to this encounter.  Follow Up:  No follow-ups on file.     An After Visit Summary and PPPS were given to the patient.             "

## 2020-12-18 RX ORDER — FLUTICASONE PROPIONATE 50 MCG
SPRAY, SUSPENSION (ML) NASAL
Qty: 48 ML | Refills: 3 | Status: SHIPPED | OUTPATIENT
Start: 2020-12-18 | End: 2021-11-23

## 2021-01-26 ENCOUNTER — OFFICE VISIT (OUTPATIENT)
Dept: OTOLARYNGOLOGY | Facility: CLINIC | Age: 76
End: 2021-01-26

## 2021-01-26 VITALS — WEIGHT: 199 LBS | HEIGHT: 64 IN | BODY MASS INDEX: 33.97 KG/M2 | TEMPERATURE: 98.3 F | OXYGEN SATURATION: 97 %

## 2021-01-26 DIAGNOSIS — J31.0 CHRONIC RHINITIS: Primary | ICD-10-CM

## 2021-01-26 PROCEDURE — 99213 OFFICE O/P EST LOW 20 MIN: CPT | Performed by: OTOLARYNGOLOGY

## 2021-01-26 NOTE — PROGRESS NOTES
Subjective   Sharon Esposito is a 75 y.o. female.       History of Present Illness   Patient is followed with chronic rhinitis.  Uses Flonase daily.  At last visit Mucinex was added as needed for postnasal drainage and she states this worked well.  She has not had any acute nasal infections this last year.        The following portions of the patient's history were reviewed and updated as appropriate: allergies, current medications, past family history, past medical history, past social history, past surgical history and problem list.     reports that she has never smoked. She has never used smokeless tobacco. She reports that she does not drink alcohol or use drugs.   Patient is not a tobacco user and has not been counseled for use of tobacco products      Review of Systems        Objective   Physical Exam  Nose: Nares show no discharge mass polyp or purulence.  Boggy mucosa is present.  No gross external deformity.   Oral cavity: Lips and gums without lesions.  Tongue and floor of mouth without lesions.  Parotid and submandibular ducts unobstructed.  No mucosal lesions on the buccal mucosa or vestibule of the mouth.  Pharynx: No erythema or exudate  Neck: No lymphadenopathy.  No thyromegaly.  Trachea and larynx midline.  No masses in the parotid or submandibular glands.      Assessment/Plan   Diagnoses and all orders for this visit:    1. Chronic rhinitis (Primary)      Plan: Continue Flonase daily and Mucinex as needed.  Encourage the patient to get her Covid vaccination.  Return in 1 year, call sooner for problems.

## 2021-02-01 RX ORDER — METFORMIN HYDROCHLORIDE 500 MG/1
1000 TABLET, EXTENDED RELEASE ORAL
Qty: 180 TABLET | Refills: 3 | Status: SHIPPED | OUTPATIENT
Start: 2021-02-01 | End: 2021-11-23

## 2021-02-10 ENCOUNTER — IMMUNIZATION (OUTPATIENT)
Dept: VACCINE CLINIC | Facility: HOSPITAL | Age: 76
End: 2021-02-10

## 2021-02-10 PROCEDURE — 0001A: CPT | Performed by: THORACIC SURGERY (CARDIOTHORACIC VASCULAR SURGERY)

## 2021-02-10 PROCEDURE — 91300 HC SARSCOV02 VAC 30MCG/0.3ML IM: CPT | Performed by: THORACIC SURGERY (CARDIOTHORACIC VASCULAR SURGERY)

## 2021-03-03 ENCOUNTER — IMMUNIZATION (OUTPATIENT)
Dept: VACCINE CLINIC | Facility: HOSPITAL | Age: 76
End: 2021-03-03

## 2021-03-03 PROCEDURE — 91300 HC SARSCOV02 VAC 30MCG/0.3ML IM: CPT | Performed by: THORACIC SURGERY (CARDIOTHORACIC VASCULAR SURGERY)

## 2021-03-03 PROCEDURE — 0002A: CPT | Performed by: THORACIC SURGERY (CARDIOTHORACIC VASCULAR SURGERY)

## 2021-03-10 ENCOUNTER — OFFICE VISIT (OUTPATIENT)
Dept: SURGERY | Facility: CLINIC | Age: 76
End: 2021-03-10

## 2021-03-10 VITALS
HEART RATE: 83 BPM | HEIGHT: 64 IN | BODY MASS INDEX: 34.15 KG/M2 | TEMPERATURE: 96.8 F | WEIGHT: 200 LBS | DIASTOLIC BLOOD PRESSURE: 76 MMHG | SYSTOLIC BLOOD PRESSURE: 118 MMHG

## 2021-03-10 DIAGNOSIS — Z86.010 HX OF ADENOMATOUS COLONIC POLYPS: Primary | ICD-10-CM

## 2021-03-10 PROCEDURE — S0260 H&P FOR SURGERY: HCPCS | Performed by: SURGERY

## 2021-03-10 RX ORDER — DEXTROSE AND SODIUM CHLORIDE 5; .45 G/100ML; G/100ML
100 INJECTION, SOLUTION INTRAVENOUS CONTINUOUS PRN
Status: CANCELLED | OUTPATIENT
Start: 2021-04-12

## 2021-03-10 NOTE — PATIENT INSTRUCTIONS
"BMI for Adults  What is BMI?  Body mass index (BMI) is a number that is calculated from a person's weight and height. BMI can help estimate how much of a person's weight is composed of fat. BMI does not measure body fat directly. Rather, it is an alternative to procedures that directly measure body fat, which can be difficult and expensive.  BMI can help identify people who may be at higher risk for certain medical problems.  What are BMI measurements used for?  BMI is used as a screening tool to identify possible weight problems. It helps determine whether a person is obese, overweight, a healthy weight, or underweight.  BMI is useful for:  · Identifying a weight problem that may be related to a medical condition or may increase the risk for medical problems.  · Promoting changes, such as changes in diet and exercise, to help reach a healthy weight. BMI screening can be repeated to see if these changes are working.  How is BMI calculated?  BMI involves measuring your weight in relation to your height. Both height and weight are measured, and the BMI is calculated from those numbers. This can be done either in English ([place] or metric measurements. Note that charts and online BMI calculators are available to help you find your BMI quickly and easily without having to do these calculations yourself.  To calculate your BMI in English ([place] measurements:    1. Measure your weight in pounds (lb).  2. Multiply the number of pounds by 703.  ? For example, for a person who weighs 180 lb, multiply that number by 703, which equals 126,540.  3. Measure your height in inches. Then multiply that number by itself to get a measurement called \"inches squared.\"  ? For example, for a person who is 70 inches tall, the \"inches squared\" measurement is 70 inches x 70 inches, which equals 4,900 inches squared.  4. Divide the total from step 2 (number of lb x 703) by the total from step 3 (inches squared): 126,540 ÷ 4,900 = 25.8. This is " "your BMI.  To calculate your BMI in metric measurements:  1. Measure your weight in kilograms (kg).  2. Measure your height in meters (m). Then multiply that number by itself to get a measurement called \"meters squared.\"  ? For example, for a person who is 1.75 m tall, the \"meters squared\" measurement is 1.75 m x 1.75 m, which is equal to 3.1 meters squared.  3. Divide the number of kilograms (your weight) by the meters squared number. In this example: 70 ÷ 3.1 = 22.6. This is your BMI.  What do the results mean?  BMI charts are used to identify whether you are underweight, normal weight, overweight, or obese. The following guidelines will be used:  · Underweight: BMI less than 18.5.  · Normal weight: BMI between 18.5 and 24.9.  · Overweight: BMI between 25 and 29.9.  · Obese: BMI of 30 or above.  Keep these notes in mind:  · Weight includes both fat and muscle, so someone with a muscular build, such as an athlete, may have a BMI that is higher than 24.9. In cases like these, BMI is not an accurate measure of body fat.  · To determine if excess body fat is the cause of a BMI of 25 or higher, further assessments may need to be done by a health care provider.  · BMI is usually interpreted in the same way for men and women.  Where to find more information  For more information about BMI, including tools to quickly calculate your BMI, go to these websites:  · Centers for Disease Control and Prevention: www.cdc.gov  · American Heart Association: www.heart.org  · National Heart, Lung, and Blood [place]: www.nhlbi.nih.gov  Summary  · Body mass index (BMI) is a number that is calculated from a person's weight and height.  · BMI may help estimate how much of a person's weight is composed of fat. BMI can help identify those who may be at higher risk for certain medical problems.  · BMI can be measured using English measurements or metric measurements.  · BMI charts are used to identify whether you are underweight, normal " weight, overweight, or obese.  This information is not intended to replace advice given to you by your health care provider. Make sure you discuss any questions you have with your health care provider.  Document Revised: 09/09/2020 Document Reviewed: 07/17/2020  Elsevier Patient Education © 2020 [employer]

## 2021-03-10 NOTE — PROGRESS NOTES
Chief Complaint   Patient presents with   • Follow-up     1 Year mirza Colon Resection 1/9/20       Sharon Esposito is a 75 y.o. female referred today for evaluation for colonoscopy.  She notes no change in bowel habits, no blood in the stool.  She still year out from a laparoscopic right colectomy for tubulovillous adenomatous polyp with dysplasia.  Patient doing well.  She needs follow-up on colonoscopy.    Prior Colonoscopy:yes  Prior Polyps:yes  Family History of Colon Cancer:no  On anticoagulation:no    Past Surgical History:   Procedure Laterality Date   • CATARACT EXTRACTION, BILATERAL     • COLON RESECTION N/A 1/9/2020    Procedure: COLON RESECTION LAPAROSCOPIC CONVERTED TO OPEN;  Surgeon: Ozzy Young MD;  Location: [facility] OR;  Service: General   • COLONOSCOPY      Approximately 04/2009   • COLONOSCOPY N/A 8/14/2019    Procedure: COLONOSCOPY Monday or Wed;  Surgeon: Tony Curiel MD;  Location: [facility] ENDOSCOPY;  Service: Gastroenterology   • COLONOSCOPY N/A 11/13/2019    Procedure: COLONOSCOPY A Wed in Nov;  Surgeon: Tony Curiel MD;  Location: [facility] ENDOSCOPY;  Service: Gastroenterology   • COLONOSCOPY N/A 1/9/2020    Procedure: COLONOSCOPY    DONE IN OR WITH ENDO             (colonoscpy first);  Surgeon: Ozzy Young MD;  Location: [facility] OR;  Service: General   • ENDOSCOPY AND COLONOSCOPY  04/20/2009    A few small diverticula in the sigmoid & the descending colon. Small internal & external hemorrhoids were present. Colonoscopy otherwise normal   • INJECTION OF MEDICATION  06/23/2015    Celestone (betamethasone) x2   • INJECTION OF MEDICATION  12/08/2014    Toradol    • PAP SMEAR  06/23/2009    Negative for intraepithelial lesion or malignancy   • SKIN BIOPSY  09/30/2010    L neck lesion- seborrheic keratosis. right infraorbital-seborrheic keratosis, right lateral orbital- intradermal nevus. forehead lesion-early squamous papilloma     Past Medical History:   Diagnosis Date   •  Acquired hypothyroidism     with hashimoto's thyroiditis      • Acute bronchitis    • Acute sinusitis    • Allergic rhinitis    • Bilateral hand pain    • Cough    • Diabetes mellitus (CMS/HCC)    • Diverticulitis of colon    • Edema     idiopathic state, history of      • Encounter for gynecological examination (general) (routine) without abnormal findings    • Encounter for screening for osteoporosis    • Essential hypertension    • Hemorrhoids     internal & external      • Hx of bone density study     DEXA BONE DENSITY APPENDICULAR: osteopenia, 12/18/2013   • Hx of screening mammography     MAMMOGRAM SCREENING: x2, 02/26/2014   • Hyperlipidemia     with elevated low density lipoprotein   • 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     history   • Infection of skin and subcutaneous tissue     Infection of skin AND/OR subcutaneous tissue      • Onychogryposis     recurrent ingrown toenail      • Open wound of face    • Osteopenia    • Screening for malignant neoplasm of breast    • Screening for osteoporosis    • Seasonal allergies     Seasonal allergy      • Vitamin deficiency    • Wears glasses      Social History     Socioeconomic History   • Marital status: Single     Spouse name: Not on file   • Number of children: Not on file   • Years of education: Not on file   • Highest education level: Not on file   Tobacco Use   • Smoking status: Never Smoker   • Smokeless tobacco: Never Used   Substance and Sexual Activity   • Alcohol use: No   • Drug use: No   • Sexual activity: Defer     Family History   Problem Relation Age of Onset   • Breast cancer Mother    • Cancer Mother 60        Breast   • Diabetes Mother    • Coronary artery disease Father    • Heart disease Father    • Hypertension Father    • Breast cancer Other    • Coronary artery disease Other    • Heart disease Other      Allergies   Allergen Reactions   • Codeine Nausea And Vomiting   • Latex Rash   • Neosporin  [Neomycin-Polymyxin-Gramicidin] Rash       Home Medications:  Prior to Admission medications    Medication Sig Start Date End Date Taking? Authorizing Provider   atorvastatin (LIPITOR) 40 MG tablet Take 1 tablet by mouth Daily. 11/10/20  Yes Jeffry Irby MD   Blood Glucose Monitoring Suppl (ONE TOUCH ULTRA SYSTEM KIT) W/DEVICE kit Use for Home Glucose Monitoring 4/21/17  Yes Jeffry Irby MD   Cyanocobalamin (VITAMIN B-12) 1000 MCG sublingual tablet Place 1 tablet under your tongue daily for the rest of your life.  Patient taking differently: Place 1 tablet under the tongue Daily. Place 1 tablet under your tongue daily for the rest of your life. 2/20/19  Yes Jeffry Irby MD   fluticasone (FLONASE) 50 MCG/ACT nasal spray INSTILL 2 SPRAYS INTO THE NOSTRIL(S) AS DIRECTED BY PROVIDER DAILY. 12/18/20  Yes Ra Adhikari MD   glucose blood (OneTouch Ultra) test strip Test Blood Sugars Once Daily As Instructed By Your Provider 11/10/20  Yes Jeffry Irby MD   guaiFENesin (MUCINEX) 600 MG 12 hr tablet Take 1 tablet by mouth 2 (Two) Times a Day. 2/8/20  Yes Susanna Sánchez APRN   Lancets (ONETOUCH DELICA PLUS [id number]) misc USE TO TEST BLOOD SUGAR ONCE DAILY E11.9 7/13/20  Yes Jeffry Irby MD   levothyroxine (SYNTHROID, LEVOTHROID) 88 MCG tablet Take 1 tablet by mouth Daily. 11/10/20  Yes Jeffry Irby MD   Loratadine 10 MG capsule Take 1 tablet by mouth Daily As Needed.   Yes Lesly Magana MD   losartan-hydrochlorothiazide (HYZAAR) 100-25 MG per tablet Take 1 tablet by mouth Daily. 11/10/20  Yes Jeffry Irby MD   metFORMIN ER (GLUCOPHAGE-XR) 500 MG 24 hr tablet Take 2 tablets by mouth Daily Before Supper. 2/1/21  Yes Diamond Hardin MD   metoprolol tartrate (LOPRESSOR) 25 MG tablet Take 0.5 tablets by mouth 2 (Two) Times a Day. 11/10/20  Yes Jeffry Irby MD   vitamin C (ASCORBIC ACID) 500 MG tablet Take 500 mg by mouth Daily.   Yes Provider, MD Lesly        Review of Systems   Constitutional: Negative.    HENT: Negative for hearing loss, nosebleeds and trouble swallowing.    Respiratory: Negative for apnea, chest tightness and shortness of breath.    Cardiovascular: Negative for chest pain and palpitations.   Gastrointestinal: Negative for abdominal distention, abdominal pain, blood in stool, constipation, diarrhea, nausea and vomiting.   Genitourinary: Negative for difficulty urinating, dysuria, frequency and urgency.   Musculoskeletal: Negative for back pain, joint swelling and neck pain.   Skin: Negative for rash.   Neurological: Negative for dizziness, seizures, weakness, light-headedness, numbness and headaches.   Hematological: Negative for adenopathy.   Psychiatric/Behavioral: Negative for agitation. The patient is not nervous/anxious.        Vitals:    03/10/21 1350   BP: 118/76   Pulse: 83   Temp: 96.8 °F (36 °C)       Physical Exam  Constitutional:       General: She is not in acute distress.     Appearance: She is well-developed.   HENT:      Head: Normocephalic and atraumatic.   Eyes:      General: No scleral icterus.     Conjunctiva/sclera: Conjunctivae normal.   Neck:      Thyroid: No thyromegaly.      Trachea: No tracheal deviation.   Cardiovascular:      Rate and Rhythm: Normal rate and regular rhythm.      Heart sounds: Normal heart sounds. No murmur. No friction rub. No gallop.    Pulmonary:      Effort: Pulmonary effort is normal. No respiratory distress.      Breath sounds: Normal breath sounds. No stridor. No wheezing or rales.   Chest:      Chest wall: No tenderness.   Abdominal:      General: Bowel sounds are normal. There is no distension.      Palpations: Abdomen is soft. There is no mass.      Tenderness: There is no abdominal tenderness. There is no guarding or rebound.      Hernia: No hernia is present.   Musculoskeletal:         General: No deformity. Normal range of motion.      Cervical back: Normal range of motion and neck supple.    Lymphadenopathy:      Cervical: No cervical adenopathy.   Skin:     General: Skin is warm and dry.      Coloration: Skin is not pale.      Findings: No erythema or rash.      Nails: There is no clubbing.   Neurological:      Mental Status: She is alert and oriented to person, place, and time.   Psychiatric:         Behavior: Behavior normal.         Thought Content: Thought content normal.         Assessment     In need of   colonoscopy.    Plan     Risks, benefits, rationale and prep for colonoscopy have been discussed with the patient.  The patient indicates understanding of these issues and agrees with the plan.

## 2021-04-09 ENCOUNTER — LAB (OUTPATIENT)
Dept: LAB | Facility: HOSPITAL | Age: 76
End: 2021-04-09

## 2021-04-09 DIAGNOSIS — Z01.818 PREOP TESTING: Primary | ICD-10-CM

## 2021-04-09 LAB — SARS-COV-2 N GENE RESP QL NAA+PROBE: NOT DETECTED

## 2021-04-09 PROCEDURE — C9803 HOPD COVID-19 SPEC COLLECT: HCPCS

## 2021-04-09 PROCEDURE — 87635 SARS-COV-2 COVID-19 AMP PRB: CPT

## 2021-04-12 ENCOUNTER — ANESTHESIA (OUTPATIENT)
Dept: GASTROENTEROLOGY | Facility: HOSPITAL | Age: 76
End: 2021-04-12

## 2021-04-12 ENCOUNTER — ANESTHESIA EVENT (OUTPATIENT)
Dept: GASTROENTEROLOGY | Facility: HOSPITAL | Age: 76
End: 2021-04-12

## 2021-04-12 ENCOUNTER — HOSPITAL ENCOUNTER (OUTPATIENT)
Facility: HOSPITAL | Age: 76
Setting detail: HOSPITAL OUTPATIENT SURGERY
Discharge: HOME | End: 2021-04-12
Attending: SURGERY | Admitting: SURGERY

## 2021-04-12 VITALS
HEIGHT: 64 IN | OXYGEN SATURATION: 97 % | BODY MASS INDEX: 34.02 KG/M2 | RESPIRATION RATE: 20 BRPM | TEMPERATURE: 97.3 F | DIASTOLIC BLOOD PRESSURE: 57 MMHG | SYSTOLIC BLOOD PRESSURE: 116 MMHG | HEART RATE: 95 BPM | WEIGHT: 199.3 LBS

## 2021-04-12 DIAGNOSIS — Z86.010 HX OF ADENOMATOUS COLONIC POLYPS: ICD-10-CM

## 2021-04-12 PROCEDURE — 25010000002 PROPOFOL 10 MG/ML EMULSION: Performed by: NURSE ANESTHETIST, CERTIFIED REGISTERED

## 2021-04-12 RX ORDER — ONDANSETRON 2 MG/ML
4 INJECTION INTRAMUSCULAR; INTRAVENOUS ONCE AS NEEDED
Status: DISCONTINUED | OUTPATIENT
Start: 2021-04-12 | End: 2021-04-12 | Stop reason: HOSPADM

## 2021-04-12 RX ORDER — DEXTROSE AND SODIUM CHLORIDE 5; .45 G/100ML; G/100ML
100 INJECTION, SOLUTION INTRAVENOUS CONTINUOUS PRN
Status: DISCONTINUED | OUTPATIENT
Start: 2021-04-12 | End: 2021-04-12 | Stop reason: HOSPADM

## 2021-04-12 RX ORDER — PROPOFOL 10 MG/ML
VIAL (ML) INTRAVENOUS AS NEEDED
Status: DISCONTINUED | OUTPATIENT
Start: 2021-04-12 | End: 2021-04-12 | Stop reason: SURG

## 2021-04-12 RX ADMIN — PROPOFOL 20 MG: 10 INJECTION, EMULSION INTRAVENOUS at 08:33

## 2021-04-12 RX ADMIN — PROPOFOL 20 MG: 10 INJECTION, EMULSION INTRAVENOUS at 08:28

## 2021-04-12 RX ADMIN — PROPOFOL 80 MG: 10 INJECTION, EMULSION INTRAVENOUS at 08:22

## 2021-04-12 RX ADMIN — PROPOFOL 20 MG: 10 INJECTION, EMULSION INTRAVENOUS at 08:35

## 2021-04-12 RX ADMIN — PROPOFOL 40 MG: 10 INJECTION, EMULSION INTRAVENOUS at 08:31

## 2021-04-12 RX ADMIN — PROPOFOL 20 MG: 10 INJECTION, EMULSION INTRAVENOUS at 08:24

## 2021-04-12 RX ADMIN — PROPOFOL 20 MG: 10 INJECTION, EMULSION INTRAVENOUS at 08:39

## 2021-04-12 RX ADMIN — PROPOFOL 20 MG: 10 INJECTION, EMULSION INTRAVENOUS at 08:23

## 2021-04-12 RX ADMIN — DEXTROSE AND SODIUM CHLORIDE 100 ML/HR: 5; 450 INJECTION, SOLUTION INTRAVENOUS at 08:12

## 2021-04-12 RX ADMIN — PROPOFOL 20 MG: 10 INJECTION, EMULSION INTRAVENOUS at 08:26

## 2021-04-12 NOTE — ANESTHESIA POSTPROCEDURE EVALUATION
Patient: Sharon Esposito    Procedure Summary     Date: 04/12/21 Room / Location: [facility] ENDOSCOPY 2 / [facility] ENDOSCOPY    Anesthesia Start: 0818 Anesthesia Stop: 0847    Procedure: COLONOSCOPY (N/A ) Diagnosis:       Hx of adenomatous colonic polyps      (Hx of adenomatous colonic polyps [Z86.010])    Surgeons: Ozzy Young MD Provider: Mar Kate CRNA    Anesthesia Type: MAC ASA Status: 3          Anesthesia Type: MAC    Vitals  No vitals data found for the desired time range.          Post Anesthesia Care and Evaluation    Patient location during evaluation: bedside  Patient participation: complete - patient participated  Level of consciousness: awake  Pain score: 0  Pain management: adequate  Airway patency: patent  Anesthetic complications: No anesthetic complications  PONV Status: none  Cardiovascular status: acceptable and hemodynamically stable  Respiratory status: acceptable and room air  Hydration status: acceptable

## 2021-04-12 NOTE — ANESTHESIA PREPROCEDURE EVALUATION
Anesthesia Evaluation     no history of anesthetic complications:  NPO Solid Status: > 8 hours  NPO Liquid Status: > 2 hours           Airway   Mallampati: II  TM distance: >3 FB  Neck ROM: full  No difficulty expected  Dental - normal exam     Pulmonary - negative pulmonary ROS and normal exam   (-) sleep apnea  Cardiovascular     Patient on routine beta blocker  Rhythm: regular  Rate: normal    (+) hypertension well controlled less than 2 medications, murmur, hyperlipidemia,   (-) CAD      Neuro/Psych  (+) numbness,     (-) seizures, CVA  GI/Hepatic/Renal/Endo    (+) obesity,   diabetes mellitus type 2 well controlled, thyroid problem (Hashimoto's) hypothyroidism    Musculoskeletal (-) negative ROS    Abdominal  - normal exam   Substance History - negative use     OB/GYN negative ob/gyn ROS         Other - negative ROS       (-) history of cancer                Anesthesia Plan    ASA 3     MAC     intravenous induction     Anesthetic plan, all risks, benefits, and alternatives have been provided, discussed and informed consent has been obtained with: patient.

## 2021-04-12 NOTE — H&P
No chief complaint on file.      Sharon Esposito is a 75 y.o. female referred today for evaluation for colonoscopy.  She notes no change in bowel habits, no blood in the stool. Hx rt colon resection 1/2020 for adenomatous polyps.    Prior Colonoscopy:yes  Prior Polyps:yes  Family History of Colon Cancer:no  On anticoagulation:no    Past Surgical History:   Procedure Laterality Date   • CATARACT EXTRACTION, BILATERAL     • COLON RESECTION N/A 1/9/2020    Procedure: COLON RESECTION LAPAROSCOPIC CONVERTED TO OPEN;  Surgeon: Ozzy Young MD;  Location: Health system OR;  Service: General   • COLONOSCOPY      Approximately 04/2009   • COLONOSCOPY N/A 8/14/2019    Procedure: COLONOSCOPY Monday or Wed;  Surgeon: Tony Curiel MD;  Location: Health system ENDOSCOPY;  Service: Gastroenterology   • COLONOSCOPY N/A 11/13/2019    Procedure: COLONOSCOPY A Wed in Nov;  Surgeon: Tony Curiel MD;  Location: Health system ENDOSCOPY;  Service: Gastroenterology   • COLONOSCOPY N/A 1/9/2020    Procedure: COLONOSCOPY    DONE IN OR WITH ENDO             (colonoscpy first);  Surgeon: Ozzy Young MD;  Location: Health system OR;  Service: General   • ENDOSCOPY AND COLONOSCOPY  04/20/2009    A few small diverticula in the sigmoid & the descending colon. Small internal & external hemorrhoids were present. Colonoscopy otherwise normal   • INJECTION OF MEDICATION  06/23/2015    Celestone (betamethasone) x2   • INJECTION OF MEDICATION  12/08/2014    Toradol    • PAP SMEAR  06/23/2009    Negative for intraepithelial lesion or malignancy   • SKIN BIOPSY  09/30/2010    L neck lesion- seborrheic keratosis. right infraorbital-seborrheic keratosis, right lateral orbital- intradermal nevus. forehead lesion-early squamous papilloma     Past Medical History:   Diagnosis Date   • Acquired hypothyroidism     with hashimoto's thyroiditis      • Acute bronchitis    • Acute sinusitis    • Allergic rhinitis    • Bilateral hand pain    • Cough    • Diabetes  mellitus (CMS/HCC)    • Diverticulitis of colon    • Edema     idiopathic state, history of      • Encounter for gynecological examination (general) (routine) without abnormal findings    • Encounter for screening for osteoporosis    • Essential hypertension    • Hemorrhoids     internal & external      • Hx of bone density study     DEXA BONE DENSITY APPENDICULAR: osteopenia, 12/18/2013   • Hx of screening mammography     MAMMOGRAM SCREENING: x2, 02/26/2014   • Hyperlipidemia     with elevated low density lipoprotein   • 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     history   • Infection of skin and subcutaneous tissue     Infection of skin AND/OR subcutaneous tissue      • Onychogryposis     recurrent ingrown toenail      • Open wound of face    • Osteopenia    • Screening for malignant neoplasm of breast    • Screening for osteoporosis    • Seasonal allergies     Seasonal allergy      • Vitamin deficiency    • Wears glasses      Social History     Socioeconomic History   • Marital status: Single     Spouse name: Not on file   • Number of children: Not on file   • Years of education: Not on file   • Highest education level: Not on file   Tobacco Use   • Smoking status: Never Smoker   • Smokeless tobacco: Never Used   Substance and Sexual Activity   • Alcohol use: No   • Drug use: No   • Sexual activity: Defer     Family History   Problem Relation Age of Onset   • Breast cancer Mother    • Cancer Mother 60        Breast   • Diabetes Mother    • Coronary artery disease Father    • Heart disease Father    • Hypertension Father    • Breast cancer Other    • Coronary artery disease Other    • Heart disease Other      Allergies   Allergen Reactions   • Codeine Nausea And Vomiting   • Latex Rash   • Neosporin [Neomycin-Polymyxin-Gramicidin] Rash       Home Medications:  Prior to Admission medications    Medication Sig Start Date End Date Taking? Authorizing Provider   atorvastatin (LIPITOR)  40 MG tablet Take 1 tablet by mouth Daily. 11/10/20  Yes Jeffry Irby MD   Blood Glucose Monitoring Suppl (ONE TOUCH ULTRA SYSTEM KIT) W/DEVICE kit Use for Home Glucose Monitoring 4/21/17  Yes Jeffry Irby MD   Cyanocobalamin (VITAMIN B-12) 1000 MCG sublingual tablet Place 1 tablet under your tongue daily for the rest of your life.  Patient taking differently: Place 1 tablet under the tongue Daily. Place 1 tablet under your tongue daily for the rest of your life. 2/20/19  Yes Jeffry Irby MD   fluticasone (FLONASE) 50 MCG/ACT nasal spray INSTILL 2 SPRAYS INTO THE NOSTRIL(S) AS DIRECTED BY PROVIDER DAILY. 12/18/20  Yes Ra Adhikari MD   glucose blood (OneTouch Ultra) test strip Test Blood Sugars Once Daily As Instructed By Your Provider 11/10/20  Yes Jeffry Irby MD   guaiFENesin (MUCINEX) 600 MG 12 hr tablet Take 1 tablet by mouth 2 (Two) Times a Day. 2/8/20  Yes Susanna Sánchez APRN   Lancets (ONETOUCH DELICA PLUS DYKKIV17A) misc USE TO TEST BLOOD SUGAR ONCE DAILY E11.9 7/13/20  Yes Jeffry Irby MD   levothyroxine (SYNTHROID, LEVOTHROID) 88 MCG tablet Take 1 tablet by mouth Daily. 11/10/20  Yes Jeffry Irby MD   Loratadine 10 MG capsule Take 1 tablet by mouth Daily As Needed.   Yes Lesly Magana MD   losartan-hydrochlorothiazide (HYZAAR) 100-25 MG per tablet Take 1 tablet by mouth Daily. 11/10/20  Yes Jeffry Irby MD   metFORMIN ER (GLUCOPHAGE-XR) 500 MG 24 hr tablet Take 2 tablets by mouth Daily Before Supper. 2/1/21  Yes Diamond Hardin MD   metoprolol tartrate (LOPRESSOR) 25 MG tablet Take 0.5 tablets by mouth 2 (Two) Times a Day. 11/10/20  Yes Jeffry Irby MD   vitamin C (ASCORBIC ACID) 500 MG tablet Take 500 mg by mouth Daily.   Yes ProviderLesly MD       Review of Systems   Constitutional: Negative.    HENT: Negative for hearing loss, nosebleeds and trouble swallowing.    Respiratory: Negative for apnea, chest tightness and shortness of  breath.    Cardiovascular: Negative for chest pain and palpitations.   Gastrointestinal: Negative for abdominal distention, abdominal pain, blood in stool, constipation, diarrhea, nausea and vomiting.   Genitourinary: Negative for difficulty urinating, dysuria, frequency and urgency.   Musculoskeletal: Negative for back pain, joint swelling and neck pain.   Skin: Negative for rash.   Neurological: Negative for dizziness, seizures, weakness, light-headedness, numbness and headaches.   Hematological: Negative for adenopathy.   Psychiatric/Behavioral: Negative for agitation. The patient is not nervous/anxious.        Vitals:    04/12/21 0800   BP: (!) 201/79   Pulse: 110   Resp: 21   Temp: 98 °F (36.7 °C)   SpO2: 98%       Physical Exam  Constitutional:       General: She is not in acute distress.     Appearance: She is well-developed.   HENT:      Head: Normocephalic and atraumatic.   Eyes:      General: No scleral icterus.     Conjunctiva/sclera: Conjunctivae normal.   Neck:      Thyroid: No thyromegaly.      Trachea: No tracheal deviation.   Cardiovascular:      Rate and Rhythm: Normal rate and regular rhythm.      Heart sounds: Normal heart sounds. No murmur heard.   No friction rub. No gallop.    Pulmonary:      Effort: Pulmonary effort is normal. No respiratory distress.      Breath sounds: Normal breath sounds. No stridor. No wheezing or rales.   Chest:      Chest wall: No tenderness.   Abdominal:      General: Bowel sounds are normal. There is no distension.      Palpations: Abdomen is soft. There is no mass.      Tenderness: There is no abdominal tenderness. There is no guarding or rebound.      Hernia: No hernia is present.   Musculoskeletal:         General: No deformity. Normal range of motion.      Cervical back: Normal range of motion and neck supple.   Lymphadenopathy:      Cervical: No cervical adenopathy.   Skin:     General: Skin is warm and dry.      Coloration: Skin is not pale.      Findings: No  erythema or rash.      Nails: There is no clubbing.   Neurological:      Mental Status: She is alert and oriented to person, place, and time.   Psychiatric:         Behavior: Behavior normal.         Thought Content: Thought content normal.         Assessment     In need of colonoscopy.    Plan     Risks, benefits, rationale and prep for colonoscopy have been discussed with the patient.  The patient indicates understanding of these issues and agrees with the plan.

## 2021-04-30 RX ORDER — ATORVASTATIN CALCIUM 40 MG/1
40 TABLET, FILM COATED ORAL DAILY
Qty: 90 TABLET | Refills: 0 | Status: SHIPPED | OUTPATIENT
Start: 2021-04-30 | End: 2021-06-30

## 2021-04-30 RX ORDER — LOSARTAN POTASSIUM AND HYDROCHLOROTHIAZIDE 25; 100 MG/1; MG/1
1 TABLET ORAL DAILY
Qty: 90 TABLET | Refills: 0 | Status: SHIPPED | OUTPATIENT
Start: 2021-04-30 | End: 2021-06-30

## 2021-04-30 RX ORDER — LEVOTHYROXINE SODIUM 88 UG/1
88 TABLET ORAL DAILY
Qty: 90 TABLET | Refills: 0 | Status: SHIPPED | OUTPATIENT
Start: 2021-04-30 | End: 2021-06-30

## 2021-05-25 ENCOUNTER — TELEPHONE (OUTPATIENT)
Dept: FAMILY MEDICINE CLINIC | Facility: CLINIC | Age: 76
End: 2021-05-25

## 2021-05-25 ENCOUNTER — OFFICE VISIT (OUTPATIENT)
Dept: FAMILY MEDICINE CLINIC | Facility: CLINIC | Age: 76
End: 2021-05-25

## 2021-05-25 ENCOUNTER — LAB (OUTPATIENT)
Dept: LAB | Facility: HOSPITAL | Age: 76
End: 2021-05-25

## 2021-05-25 VITALS
DIASTOLIC BLOOD PRESSURE: 70 MMHG | OXYGEN SATURATION: 97 % | SYSTOLIC BLOOD PRESSURE: 140 MMHG | HEART RATE: 83 BPM | WEIGHT: 203 LBS | HEIGHT: 64 IN | BODY MASS INDEX: 34.66 KG/M2

## 2021-05-25 DIAGNOSIS — E11.9 DIABETES MELLITUS WITHOUT COMPLICATION (HCC): Primary | ICD-10-CM

## 2021-05-25 DIAGNOSIS — L85.3 DRY SKIN DERMATITIS: ICD-10-CM

## 2021-05-25 DIAGNOSIS — I10 ESSENTIAL HYPERTENSION: ICD-10-CM

## 2021-05-25 DIAGNOSIS — H61.21 RIGHT EAR IMPACTED CERUMEN: ICD-10-CM

## 2021-05-25 DIAGNOSIS — Z76.89 ENCOUNTER TO ESTABLISH CARE: ICD-10-CM

## 2021-05-25 DIAGNOSIS — E11.9 DIABETES MELLITUS WITHOUT COMPLICATION (HCC): ICD-10-CM

## 2021-05-25 DIAGNOSIS — E03.9 ACQUIRED HYPOTHYROIDISM: ICD-10-CM

## 2021-05-25 DIAGNOSIS — M54.31 BILATERAL SCIATICA: ICD-10-CM

## 2021-05-25 DIAGNOSIS — M54.32 BILATERAL SCIATICA: ICD-10-CM

## 2021-05-25 LAB
HBA1C MFR BLD: 6.1 % (ref 4.8–5.6)
TSH SERPL DL<=0.05 MIU/L-ACNC: 2.92 UIU/ML (ref 0.27–4.2)

## 2021-05-25 PROCEDURE — 83036 HEMOGLOBIN GLYCOSYLATED A1C: CPT

## 2021-05-25 PROCEDURE — 36415 COLL VENOUS BLD VENIPUNCTURE: CPT

## 2021-05-25 PROCEDURE — 84443 ASSAY THYROID STIM HORMONE: CPT

## 2021-05-25 PROCEDURE — 99214 OFFICE O/P EST MOD 30 MIN: CPT | Performed by: FAMILY MEDICINE

## 2021-05-25 RX ORDER — IBUPROFEN 200 MG
200 TABLET ORAL EVERY 6 HOURS PRN
COMMUNITY

## 2021-05-25 RX ORDER — LATANOPROST 50 UG/ML
SOLUTION/ DROPS OPHTHALMIC
COMMUNITY
Start: 2021-04-26
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
## 2021-05-26 NOTE — TELEPHONE ENCOUNTER
Per Dr. Hardin, Ms. Esposito has been called with recent lab results and recommendations.   Continue current medications and follow-up as planned or sooner if any problems

## 2021-06-16 ENCOUNTER — OFFICE VISIT (OUTPATIENT)
Dept: OBSTETRICS AND GYNECOLOGY | Facility: CLINIC | Age: 76
End: 2021-06-16

## 2021-06-16 VITALS
DIASTOLIC BLOOD PRESSURE: 80 MMHG | SYSTOLIC BLOOD PRESSURE: 132 MMHG | BODY MASS INDEX: 35.17 KG/M2 | WEIGHT: 206 LBS | HEIGHT: 64 IN

## 2021-06-16 DIAGNOSIS — Z12.31 SCREENING MAMMOGRAM, ENCOUNTER FOR: ICD-10-CM

## 2021-06-16 DIAGNOSIS — Z01.419 WELL WOMAN EXAM WITH ROUTINE GYNECOLOGICAL EXAM: Primary | ICD-10-CM

## 2021-06-16 DIAGNOSIS — N95.1 MENOPAUSAL STATE: ICD-10-CM

## 2021-06-16 PROCEDURE — G0101 CA SCREEN;PELVIC/BREAST EXAM: HCPCS | Performed by: NURSE PRACTITIONER

## 2021-06-18 NOTE — PROGRESS NOTES
Subjective   Sharon Esposito is a 75 y.o. here for gyn annual    Post menopausal  Mammo: 6/2020 Normal  Dexa-mild osteopenia 2019  Pap: 2016 Normal    Sharon Esposito is a 75 yr old post-menopausal who presents today for her gyn annual. Denies any concerns. Pt has a , Royce for the last 27 years. She is still able to drive. Lives by herself. She is trying to stay active as much as possible. Not sexually active.     Gynecologic Exam  The patient's pertinent negatives include no genital itching, genital lesions, genital odor, genital rash, missed menses, pelvic pain, vaginal bleeding or vaginal discharge. Pertinent negatives include no abdominal pain, chills, constipation, diarrhea, dysuria, fever, frequency, nausea, rash or sore throat. No, her partner does not have an STD. She is postmenopausal.       The following portions of the patient's history were reviewed and updated as appropriate: allergies, current medications, past family history, past medical history, past social history, past surgical history and problem list.    Review of Systems   Constitutional: Negative for chills, fatigue, fever, unexpected weight gain and unexpected weight loss.   HENT: Negative for sneezing and sore throat.    Respiratory: Negative for shortness of breath.    Cardiovascular: Negative for chest pain and palpitations.   Gastrointestinal: Negative for abdominal pain, constipation, diarrhea and nausea.   Endocrine: Negative for cold intolerance and heat intolerance.   Genitourinary: Negative for amenorrhea, breast discharge, breast lump, breast pain, difficulty urinating, dysuria, frequency, menstrual problem, missed menses, pelvic pain, pelvic pressure, urinary incontinence, vaginal bleeding, vaginal discharge and vaginal pain.   Skin: Negative for rash.   Neurological: Negative for weakness and headache.   Psychiatric/Behavioral: Negative for sleep disturbance, depressed mood and stress.       Objective   Physical  Exam  Vitals and nursing note reviewed. Exam conducted with a chaperone present.   Constitutional:       Appearance: She is well-developed.   HENT:      Head: Normocephalic and atraumatic.   Eyes:      Conjunctiva/sclera: Conjunctivae normal.   Neck:      Thyroid: No thyromegaly.   Cardiovascular:      Rate and Rhythm: Normal rate and regular rhythm.      Heart sounds: Normal heart sounds.   Pulmonary:      Effort: Pulmonary effort is normal. No respiratory distress.      Breath sounds: Normal breath sounds.   Chest:      Breasts:         Right: No swelling, bleeding, inverted nipple, mass, nipple discharge, skin change or tenderness.         Left: No swelling, bleeding, inverted nipple, mass, nipple discharge, skin change or tenderness.      Comments: Multiple brown moles on both breasts.   Abdominal:      General: Bowel sounds are normal. There is no distension.      Palpations: Abdomen is soft.      Tenderness: There is no abdominal tenderness.   Genitourinary:     Labia:         Right: No rash, tenderness, lesion or injury.         Left: No rash, tenderness, lesion or injury.       Urethra: No prolapse, urethral pain, urethral swelling or urethral lesion.      Vagina: Normal.      Cervix: Normal.      Uterus: Normal.       Adnexa: Right adnexa normal and left adnexa normal.      Rectum: Normal.   Musculoskeletal:         General: Normal range of motion.      Cervical back: Normal range of motion and neck supple.   Skin:     General: Skin is warm and dry.   Neurological:      Mental Status: She is alert and oriented to person, place, and time.   Psychiatric:         Behavior: Behavior normal.         Assessment/Plan   Diagnoses and all orders for this visit:    1. Well woman exam with routine gynecological exam (Primary)    2. Screening mammogram, encounter for  -     Mammo Screening Digital Tomosynthesis Bilateral With CAD; Future    3. Menopausal state  -     dexa bone density axial; Future      Reviewed gyn  annual, breast awareness, and annual mammo. Discussed she may stop mammogram screening at age 77, if she desires. Return for any PMB. Encourage regular exercise and a healthy diet. Return in one year.

## 2021-06-21 ENCOUNTER — TELEPHONE (OUTPATIENT)
Dept: OBSTETRICS AND GYNECOLOGY | Facility: CLINIC | Age: 76
End: 2021-06-21

## 2021-06-21 NOTE — TELEPHONE ENCOUNTER
----- Message from LEXI Garcia sent at 6/18/2021  2:44 PM CDT -----  No osteoporosis but some thinning of the hip bone. Please let patient know.

## 2021-06-22 ENCOUNTER — TELEPHONE (OUTPATIENT)
Dept: OBSTETRICS AND GYNECOLOGY | Facility: CLINIC | Age: 76
End: 2021-06-22

## 2021-06-30 RX ORDER — LOSARTAN POTASSIUM AND HYDROCHLOROTHIAZIDE 25; 100 MG/1; MG/1
TABLET ORAL
Qty: 90 TABLET | Refills: 0 | Status: SHIPPED | OUTPATIENT
Start: 2021-06-30 | End: 2021-11-23

## 2021-06-30 RX ORDER — ATORVASTATIN CALCIUM 40 MG/1
TABLET, FILM COATED ORAL
Qty: 90 TABLET | Refills: 0 | Status: SHIPPED | OUTPATIENT
Start: 2021-06-30 | End: 2021-11-17 | Stop reason: SDUPTHER

## 2021-06-30 RX ORDER — LEVOTHYROXINE SODIUM 88 UG/1
TABLET ORAL
Qty: 90 TABLET | Refills: 0 | Status: SHIPPED | OUTPATIENT
Start: 2021-06-30 | End: 2021-11-17 | Stop reason: SDUPTHER

## 2021-08-23 DIAGNOSIS — E11.9 DIABETES MELLITUS WITHOUT COMPLICATION (HCC): ICD-10-CM

## 2021-08-23 RX ORDER — BLOOD SUGAR DIAGNOSTIC
STRIP MISCELLANEOUS
Qty: 100 EACH | Refills: 0 | Status: SHIPPED | OUTPATIENT
Start: 2021-08-23 | End: 2021-11-23

## 2021-08-23 RX ORDER — LANCETS 33 GAUGE
1 EACH MISCELLANEOUS DAILY
Qty: 100 EACH | Refills: 3 | Status: SHIPPED | OUTPATIENT
Start: 2021-08-23 | End: 2021-08-25 | Stop reason: SDUPTHER

## 2021-08-25 RX ORDER — LANCETS 33 GAUGE
1 EACH MISCELLANEOUS DAILY
Qty: 100 EACH | Refills: 3 | Status: SHIPPED | OUTPATIENT
Start: 2021-08-25 | End: 2023-02-13

## 2021-11-17 RX ORDER — LEVOTHYROXINE SODIUM 88 UG/1
88 TABLET ORAL DAILY
Qty: 90 TABLET | Refills: 3 | Status: SHIPPED | OUTPATIENT
Start: 2021-11-17 | End: 2022-10-12

## 2021-11-17 RX ORDER — ATORVASTATIN CALCIUM 40 MG/1
40 TABLET, FILM COATED ORAL DAILY
Qty: 90 TABLET | Refills: 0 | Status: SHIPPED | OUTPATIENT
Start: 2021-11-17 | End: 2021-11-29

## 2021-11-23 DIAGNOSIS — E11.9 DIABETES MELLITUS WITHOUT COMPLICATION (HCC): ICD-10-CM

## 2021-11-23 RX ORDER — FLUTICASONE PROPIONATE 50 MCG
SPRAY, SUSPENSION (ML) NASAL
Qty: 48 ML | Refills: 3 | Status: SHIPPED | OUTPATIENT
Start: 2021-11-23 | End: 2022-02-08 | Stop reason: SDUPTHER

## 2021-11-23 RX ORDER — METFORMIN HYDROCHLORIDE 500 MG/1
1000 TABLET, EXTENDED RELEASE ORAL
Qty: 180 TABLET | Refills: 3 | Status: SHIPPED | OUTPATIENT
Start: 2021-11-23 | End: 2022-10-12

## 2021-11-23 RX ORDER — LOSARTAN POTASSIUM AND HYDROCHLOROTHIAZIDE 25; 100 MG/1; MG/1
TABLET ORAL
Qty: 90 TABLET | Refills: 0 | Status: SHIPPED | OUTPATIENT
Start: 2021-11-23 | End: 2021-11-29

## 2021-11-23 RX ORDER — BLOOD SUGAR DIAGNOSTIC
STRIP MISCELLANEOUS
Qty: 100 EACH | Refills: 3 | Status: SHIPPED | OUTPATIENT
Start: 2021-11-23 | End: 2022-10-12

## 2021-11-29 RX ORDER — ATORVASTATIN CALCIUM 40 MG/1
TABLET, FILM COATED ORAL
Qty: 90 TABLET | Refills: 0 | Status: SHIPPED | OUTPATIENT
Start: 2021-11-29 | End: 2022-03-01

## 2021-11-29 RX ORDER — LOSARTAN POTASSIUM AND HYDROCHLOROTHIAZIDE 25; 100 MG/1; MG/1
TABLET ORAL
Qty: 90 TABLET | Refills: 0 | Status: SHIPPED | OUTPATIENT
Start: 2021-11-29 | End: 2022-04-18

## 2021-12-15 ENCOUNTER — LAB (OUTPATIENT)
Dept: LAB | Facility: HOSPITAL | Age: 76
End: 2021-12-15

## 2021-12-15 ENCOUNTER — OFFICE VISIT (OUTPATIENT)
Dept: FAMILY MEDICINE CLINIC | Facility: CLINIC | Age: 76
End: 2021-12-15

## 2021-12-15 VITALS
HEART RATE: 89 BPM | WEIGHT: 199.2 LBS | BODY MASS INDEX: 34.01 KG/M2 | SYSTOLIC BLOOD PRESSURE: 146 MMHG | OXYGEN SATURATION: 97 % | DIASTOLIC BLOOD PRESSURE: 74 MMHG | HEIGHT: 64 IN

## 2021-12-15 DIAGNOSIS — I10 ESSENTIAL HYPERTENSION: ICD-10-CM

## 2021-12-15 DIAGNOSIS — E11.41 TYPE 2 DIABETES MELLITUS WITH DIABETIC MONONEUROPATHY, WITHOUT LONG-TERM CURRENT USE OF INSULIN (HCC): ICD-10-CM

## 2021-12-15 DIAGNOSIS — Z00.00 MEDICARE ANNUAL WELLNESS VISIT, SUBSEQUENT: Primary | ICD-10-CM

## 2021-12-15 DIAGNOSIS — L98.9 SKIN LESION OF SCALP: ICD-10-CM

## 2021-12-15 DIAGNOSIS — E03.9 ACQUIRED HYPOTHYROIDISM: ICD-10-CM

## 2021-12-15 DIAGNOSIS — H61.21 RIGHT EAR IMPACTED CERUMEN: ICD-10-CM

## 2021-12-15 PROCEDURE — G0439 PPPS, SUBSEQ VISIT: HCPCS | Performed by: FAMILY MEDICINE

## 2021-12-15 PROCEDURE — 84443 ASSAY THYROID STIM HORMONE: CPT

## 2021-12-15 PROCEDURE — 80061 LIPID PANEL: CPT

## 2021-12-15 PROCEDURE — 85025 COMPLETE CBC W/AUTO DIFF WBC: CPT

## 2021-12-15 PROCEDURE — 1159F MED LIST DOCD IN RCRD: CPT | Performed by: FAMILY MEDICINE

## 2021-12-15 PROCEDURE — 83036 HEMOGLOBIN GLYCOSYLATED A1C: CPT

## 2021-12-15 PROCEDURE — 1170F FXNL STATUS ASSESSED: CPT | Performed by: FAMILY MEDICINE

## 2021-12-15 PROCEDURE — 36415 COLL VENOUS BLD VENIPUNCTURE: CPT

## 2021-12-15 PROCEDURE — 80053 COMPREHEN METABOLIC PANEL: CPT

## 2021-12-15 RX ORDER — TIMOLOL MALEATE 5 MG/ML
SOLUTION/ DROPS OPHTHALMIC
COMMUNITY
Start: 2021-12-10

## 2021-12-15 NOTE — PROGRESS NOTES
The ABCs of the Annual Wellness Visit  Subsequent Medicare Wellness Visit    Chief Complaint   Patient presents with   • Medicare Wellness-subsequent     Subjective    History of Present Illness:  Sharon Esposito is a 76 y.o. female who presents for a Subsequent Medicare Wellness Visit.    The following portions of the patient's history were reviewed and   updated as appropriate: allergies, current medications, past family history, past medical history, past social history, past surgical history and problem list.    Compared to one year ago, the patient feels her physical   health is the same.    Compared to one year ago, the patient feels her mental   health is the same.    Recent Hospitalizations:  She was not admitted to the hospital during the last year.       Current Medical Providers:  Patient Care Team:  Diamond Hardin MD as PCP - General (Family Medicine)  Laura Robles APRN (Gastroenterology)  Ozzy Young MD as Surgeon (General Surgery)    Outpatient Medications Prior to Visit   Medication Sig Dispense Refill   • atorvastatin (LIPITOR) 40 MG tablet TAKE 1 TABLET BY MOUTH EVERY DAY 90 tablet 0   • Blood Glucose Monitoring Suppl (ONE TOUCH ULTRA SYSTEM KIT) W/DEVICE kit Use for Home Glucose Monitoring 1 each 0   • Cyanocobalamin (VITAMIN B-12) 1000 MCG sublingual tablet Place 1 tablet under your tongue daily for the rest of your life. (Patient taking differently: Place 1 tablet under the tongue Daily. Place 1 tablet under your tongue daily for the rest of your life.) 100 each 4   • fluticasone (FLONASE) 50 MCG/ACT nasal spray INSTILL 2 SPRAYS INTO THE NOSTRIL(S) AS DIRECTED BY PROVIDER DAILY. 48 mL 3   • guaiFENesin (MUCINEX) 600 MG 12 hr tablet Take 1 tablet by mouth 2 (Two) Times a Day. 30 tablet 0   • ibuprofen (ADVIL,MOTRIN) 200 MG tablet Take 200 mg by mouth Every 6 (Six) Hours As Needed for Mild Pain .     • Lancets (OneTouch Delica Plus Auqomr98A) misc 1 each by Other route Daily. Dx:  E11.9 100 each 3   • latanoprost (XALATAN) 0.005 % ophthalmic solution INSTILL 1 DROP INTO AFFECTED EYE EVERY DAY IN THE EVENING     • levothyroxine (SYNTHROID, LEVOTHROID) 88 MCG tablet Take 1 tablet by mouth Daily. 90 tablet 3   • Loratadine 10 MG capsule Take 1 tablet by mouth Daily As Needed.     • losartan-hydrochlorothiazide (HYZAAR) 100-25 MG per tablet TAKE 1 TABLET BY MOUTH EVERY DAY 90 tablet 0   • metFORMIN ER (GLUCOPHAGE-XR) 500 MG 24 hr tablet TAKE 2 TABLETS BY MOUTH DAILY BEFORE SUPPER. 180 tablet 3   • metoprolol tartrate (LOPRESSOR) 25 MG tablet TAKE 1/2 TABLET BY MOUTH TWICE DAILY 90 tablet 0   • OneTouch Ultra test strip TEST BLOOD SUGARS ONCE DAILY AS INSTRUCTED BY YOUR PROVIDER 100 each 3   • timolol (TIMOPTIC) 0.5 % ophthalmic solution      • vitamin C (ASCORBIC ACID) 500 MG tablet Take 500 mg by mouth Daily.       No facility-administered medications prior to visit.       No opioid medication identified on active medication list. I have reviewed chart for other potential  high risk medication/s and harmful drug interactions in the elderly.          Aspirin is not on active medication list.  Aspirin use is not indicated based on review of current medical condition/s. Risk of harm outweighs potential benefits.  .      Patient Active Problem List   Diagnosis   • Need for hepatitis C screening test   • Hyperlipidemia   • Acquired hypothyroidism   • Seasonal allergies   • Osteopenia   • Essential hypertension   • Edema   • Diverticulitis of colon   • Allergic rhinitis   • Postmenopausal   • Ingrown toenail   • Paresthesia of both feet   • Type 2 diabetes mellitus without complication, without long-term current use of insulin (HCC)   • Cobalamin deficiency   • Diabetic peripheral neuropathy (HCC)   • Encounter for screening for malignant neoplasm of colon   • Villous adenoma of colon 08/2019   • Adenomatous polyp   • Hx of adenomatous colonic polyps     Advance Care Planning  Advance Directive is not  "on file.  ACP discussion was held with the patient during this visit. Patient does not have an advance directive, information provided.          Objective    Vitals:    12/15/21 0920   BP: 146/74   Pulse: 89   SpO2: 97%   Weight: 90.4 kg (199 lb 3.2 oz)   Height: 162.6 cm (64\")     BMI Readings from Last 1 Encounters:   12/15/21 34.19 kg/m²   BMI is above normal parameters. Recommendations include: exercise counseling and nutrition counseling    Does the patient have evidence of cognitive impairment? No    Physical Exam  Vitals reviewed.   Constitutional:       General: She is not in acute distress.     Appearance: She is well-developed.   Cardiovascular:      Rate and Rhythm: Normal rate and regular rhythm.      Heart sounds: Normal heart sounds. No murmur heard.      Pulmonary:      Effort: Pulmonary effort is normal. No respiratory distress.      Breath sounds: Normal breath sounds. No wheezing or rales.   Abdominal:      Palpations: Abdomen is soft.      Tenderness: There is no abdominal tenderness.   Musculoskeletal:      Cervical back: Neck supple.   Skin:     General: Skin is warm and dry.      Comments: Skin lesion on scalp, multiple, largest lesion is oval shaped and approximately 5.5 cm x 2 cm, smaller lesions more anteriorly, scabbing/crusting present, picture included below   Neurological:      Mental Status: She is alert and oriented to person, place, and time.                      HEALTH RISK ASSESSMENT    Smoking Status:  Social History     Tobacco Use   Smoking Status Never Smoker   Smokeless Tobacco Never Used     Alcohol Consumption:  Social History     Substance and Sexual Activity   Alcohol Use No     Fall Risk Screen:    STEADI Fall Risk Assessment was completed, and patient is at MODERATE risk for falls. Assessment completed on:5/25/2021    Depression Screening:  PHQ-2/PHQ-9 Depression Screening 12/15/2021   Little interest or pleasure in doing things 0   Feeling down, depressed, or hopeless 0 "   Trouble falling or staying asleep, or sleeping too much 0   Feeling tired or having little energy 0   Poor appetite or overeating 0   Feeling bad about yourself - or that you are a failure or have let yourself or your family down 0   Trouble concentrating on things, such as reading the newspaper or watching television 0   Moving or speaking so slowly that other people could have noticed. Or the opposite - being so fidgety or restless that you have been moving around a lot more than usual 0   Thoughts that you would be better off dead, or of hurting yourself in some way 0   Total Score 0   If you checked off any problems, how difficult have these problems made it for you to do your work, take care of things at home, or get along with other people? Not difficult at all       Health Habits and Functional and Cognitive Screening:  Functional & Cognitive Status 12/15/2021   Do you have difficulty preparing food and eating? No   Do you have difficulty bathing yourself, getting dressed or grooming yourself? No   Do you have difficulty using the toilet? No   Do you have difficulty moving around from place to place? No   Do you have trouble with steps or getting out of a bed or a chair? No   Current Diet Well Balanced Diet   Dental Exam Not up to date   Eye Exam Up to date   Exercise (times per week) 4 times per week   Current Exercises Include Yard Work   Current Exercise Activities Include -   Do you need help using the phone?  No   Are you deaf or do you have serious difficulty hearing?  No   Do you need help with transportation? No   Do you need help shopping? No   Do you need help preparing meals?  No   Do you need help with housework?  No   Do you need help with laundry? No   Do you need help taking your medications? No   Do you need help managing money? No   Do you ever drive or ride in a car without wearing a seat belt? No   Have you felt unusual stress, anger or loneliness in the last month? No   Who do you live  with? Alone   If you need help, do you have trouble finding someone available to you? No   Have you been bothered in the last four weeks by sexual problems? No   Do you have difficulty concentrating, remembering or making decisions? No       Age-appropriate Screening Schedule:  Refer to the list below for future screening recommendations based on patient's age, sex and/or medical conditions. Orders for these recommended tests are listed in the plan section. The patient has been provided with a written plan.    Health Maintenance   Topic Date Due   • ZOSTER VACCINE (2 of 2) 01/17/2017   • DIABETIC FOOT EXAM  01/20/2021   • DIABETIC EYE EXAM  03/02/2021   • INFLUENZA VACCINE  08/01/2021   • TDAP/TD VACCINES (2 - Td or Tdap) 09/19/2021   • HEMOGLOBIN A1C  11/25/2021   • LIPID PANEL  12/09/2021   • URINE MICROALBUMIN  12/09/2021   • MAMMOGRAM  06/16/2023   • DXA SCAN  06/16/2023              Assessment/Plan   CMS Preventative Services Quick Reference  Risk Factors Identified During Encounter  Glaucoma or Family History of Glaucoma  Immunizations Discussed/Encouraged (specific Immunizations; COVID19  Inactivity/Sedentary  Obesity/Overweight   The above risks/problems have been discussed with the patient.  Follow up actions/plans if indicated are seen below in the Assessment/Plan Section.  Pertinent information has been shared with the patient in the After Visit Summary.    Diagnoses and all orders for this visit:    1. Medicare annual wellness visit, subsequent (Primary)    2. Essential hypertension  -     Lipid Panel; Future  -     CBC & Differential; Future  -     Comprehensive Metabolic Panel; Future    3. Acquired hypothyroidism  -     TSH; Future    4. Right ear impacted cerumen    5. Type 2 diabetes mellitus with diabetic mononeuropathy, without long-term current use of insulin (HCC)  -     Hemoglobin A1c; Future    6. Skin lesion of scalp  -     Cancel: Ambulatory Referral to Dermatology  -     Ambulatory Referral  to Dermatology      Medicare wellness visit completed today  Patient will continue to monitor blood pressure and blood glucose levels  Check lipid panel, CBC, CMP and hemoglobin A1c  We will also check TSH for thyroid  Recommended Debrox use in the right ear for 4 days  Return next week for irrigation cerumen removal right ear  Skin lesion of the scalp with significant growth, concerning for malignancy  Timely referral to dermatology for evaluation    Follow Up:   Return in about 1 week (around 12/22/2021) for ear irrigation, cerumen removal.     An After Visit Summary and PPPS were made available to the patient.                   This document has been electronically signed by Diamond Hardin MD

## 2021-12-16 ENCOUNTER — TELEPHONE (OUTPATIENT)
Dept: FAMILY MEDICINE CLINIC | Facility: CLINIC | Age: 76
End: 2021-12-16

## 2021-12-16 LAB
ALBUMIN SERPL-MCNC: 4.2 G/DL (ref 3.5–5.2)
ALBUMIN/GLOB SERPL: 1.2 G/DL
ALP SERPL-CCNC: 87 U/L (ref 39–117)
ALT SERPL W P-5'-P-CCNC: 13 U/L (ref 1–33)
ANION GAP SERPL CALCULATED.3IONS-SCNC: 13.3 MMOL/L (ref 5–15)
AST SERPL-CCNC: 17 U/L (ref 1–32)
BASOPHILS # BLD AUTO: 0.06 10*3/MM3 (ref 0–0.2)
BASOPHILS NFR BLD AUTO: 0.7 % (ref 0–1.5)
BILIRUB SERPL-MCNC: 0.6 MG/DL (ref 0–1.2)
BUN SERPL-MCNC: 11 MG/DL (ref 8–23)
BUN/CREAT SERPL: 15.3 (ref 7–25)
CALCIUM SPEC-SCNC: 10.1 MG/DL (ref 8.6–10.5)
CHLORIDE SERPL-SCNC: 103 MMOL/L (ref 98–107)
CHOLEST SERPL-MCNC: 140 MG/DL (ref 0–200)
CO2 SERPL-SCNC: 25.7 MMOL/L (ref 22–29)
CREAT SERPL-MCNC: 0.72 MG/DL (ref 0.57–1)
DEPRECATED RDW RBC AUTO: 45.7 FL (ref 37–54)
EOSINOPHIL # BLD AUTO: 0.51 10*3/MM3 (ref 0–0.4)
EOSINOPHIL NFR BLD AUTO: 5.9 % (ref 0.3–6.2)
ERYTHROCYTE [DISTWIDTH] IN BLOOD BY AUTOMATED COUNT: 14.1 % (ref 12.3–15.4)
GFR SERPL CREATININE-BSD FRML MDRD: 79 ML/MIN/1.73
GLOBULIN UR ELPH-MCNC: 3.4 GM/DL
GLUCOSE SERPL-MCNC: 95 MG/DL (ref 65–99)
HBA1C MFR BLD: 6.34 % (ref 4.8–5.6)
HCT VFR BLD AUTO: 38.3 % (ref 34–46.6)
HDLC SERPL-MCNC: 63 MG/DL (ref 40–60)
HGB BLD-MCNC: 12.5 G/DL (ref 12–15.9)
IMM GRANULOCYTES # BLD AUTO: 0.02 10*3/MM3 (ref 0–0.05)
IMM GRANULOCYTES NFR BLD AUTO: 0.2 % (ref 0–0.5)
LDLC SERPL CALC-MCNC: 50 MG/DL (ref 0–100)
LDLC/HDLC SERPL: 0.7 {RATIO}
LYMPHOCYTES # BLD AUTO: 2.92 10*3/MM3 (ref 0.7–3.1)
LYMPHOCYTES NFR BLD AUTO: 33.7 % (ref 19.6–45.3)
MCH RBC QN AUTO: 28.6 PG (ref 26.6–33)
MCHC RBC AUTO-ENTMCNC: 32.6 G/DL (ref 31.5–35.7)
MCV RBC AUTO: 87.6 FL (ref 79–97)
MONOCYTES # BLD AUTO: 0.59 10*3/MM3 (ref 0.1–0.9)
MONOCYTES NFR BLD AUTO: 6.8 % (ref 5–12)
NEUTROPHILS NFR BLD AUTO: 4.56 10*3/MM3 (ref 1.7–7)
NEUTROPHILS NFR BLD AUTO: 52.7 % (ref 42.7–76)
NRBC BLD AUTO-RTO: 0 /100 WBC (ref 0–0.2)
PLATELET # BLD AUTO: 361 10*3/MM3 (ref 140–450)
PMV BLD AUTO: 10.4 FL (ref 6–12)
POTASSIUM SERPL-SCNC: 4.8 MMOL/L (ref 3.5–5.2)
PROT SERPL-MCNC: 7.6 G/DL (ref 6–8.5)
RBC # BLD AUTO: 4.37 10*6/MM3 (ref 3.77–5.28)
SODIUM SERPL-SCNC: 142 MMOL/L (ref 136–145)
TRIGL SERPL-MCNC: 166 MG/DL (ref 0–150)
TSH SERPL DL<=0.05 MIU/L-ACNC: 2.3 UIU/ML (ref 0.27–4.2)
VLDLC SERPL-MCNC: 27 MG/DL (ref 5–40)
WBC NRBC COR # BLD: 8.66 10*3/MM3 (ref 3.4–10.8)

## 2021-12-16 NOTE — TELEPHONE ENCOUNTER
Please call and let patient know the following labs;    -TSH is normal  -CMP is okay  -CBC is okay  -Lipid panel is okay  -Hemoglobin A1c is stable at 6.34%. Continue to monitor her sugar and carbs in her diet. Also, increase physical activity as tolerated.     ThanksBETH

## 2021-12-16 NOTE — TELEPHONE ENCOUNTER
Pr Dr. Hardin, Ms Morro has been called with recent lab results and recommendations.   Continue current medications and follow-up as planned or sooner if any problems.

## 2021-12-20 ENCOUNTER — OFFICE VISIT (OUTPATIENT)
Dept: FAMILY MEDICINE CLINIC | Facility: CLINIC | Age: 76
End: 2021-12-20

## 2021-12-20 DIAGNOSIS — H61.21 IMPACTED CERUMEN OF RIGHT EAR: Primary | ICD-10-CM

## 2021-12-20 PROCEDURE — 69209 REMOVE IMPACTED EAR WAX UNI: CPT | Performed by: FAMILY MEDICINE

## 2021-12-20 NOTE — PROGRESS NOTES
Chief Complaint  Cerumen Impaction (right ear)    Subjective          Sharon Esposito presents to [facility] [place]  History of Present Illness    Patient seen today for procedure only, irrigation for cerumen impaction.  Risks and benefits of procedure discussed.     Objective   Physical Exam  HENT:      Right Ear: There is impacted cerumen.       Ear Cerumen Removal    Date/Time: 12/20/2021 8:49 AM  Performed by: Diamond Hardin MD  Authorized by: Diamond Hardin MD     Anesthesia:  Local Anesthetic: none  Location details: right ear  Patient tolerance: patient tolerated the procedure well with no immediate complications  Procedure type: irrigation              Assessment and Plan    Diagnoses and all orders for this visit:    1. Impacted cerumen of right ear (Primary)  -     Cerumen Removal      Patient seen today for cerumen removal  Used Debrox for 5 days prior to appointment  Unable to remove cerumen impaction  Patient advised to repeat Debrox treatment x 2 prior to appointment with ENT 02/2022.          Follow Up   Return for Next scheduled follow up.  Patient was given instructions and counseling regarding her condition or for health maintenance advice. Please see specific information pulled into the AVS if appropriate.           This document has been electronically signed by Diamond Hardin MD

## 2022-02-08 ENCOUNTER — OFFICE VISIT (OUTPATIENT)
Dept: OTOLARYNGOLOGY | Facility: CLINIC | Age: 77
End: 2022-02-08

## 2022-02-08 VITALS — OXYGEN SATURATION: 99 % | HEIGHT: 64 IN | WEIGHT: 198.8 LBS | TEMPERATURE: 97.6 F | BODY MASS INDEX: 33.94 KG/M2

## 2022-02-08 DIAGNOSIS — H61.21 IMPACTED CERUMEN OF RIGHT EAR: ICD-10-CM

## 2022-02-08 DIAGNOSIS — J31.0 CHRONIC RHINITIS: Primary | ICD-10-CM

## 2022-02-08 PROCEDURE — 69210 REMOVE IMPACTED EAR WAX UNI: CPT | Performed by: OTOLARYNGOLOGY

## 2022-02-08 PROCEDURE — 99213 OFFICE O/P EST LOW 20 MIN: CPT | Performed by: OTOLARYNGOLOGY

## 2022-02-08 RX ORDER — FLUTICASONE PROPIONATE 50 MCG
2 SPRAY, SUSPENSION (ML) NASAL DAILY
Qty: 48 ML | Refills: 3 | Status: SHIPPED | OUTPATIENT
Start: 2022-02-08 | End: 2023-01-30

## 2022-02-13 NOTE — PROGRESS NOTES
"Subjective   Sharon Esposito is a 76 y.o. female.       History of Present Illness   Patient is followed with chronic rhinitis.  Uses Flonase.  Says generally her nasal symptoms have been well controlled but she has been a little more congested in the last couple of weeks but has not been having any significant rhinorrhea.  Apparently was also told her ear was \"stopped up\".      The following portions of the patient's history were reviewed and updated as appropriate: allergies, current medications, past family history, past medical history, past social history, past surgical history and problem list.     reports that she has never smoked. She has never used smokeless tobacco. She reports that she does not drink alcohol and does not use drugs.   Patient is not a tobacco user and has not been counseled for use of tobacco products      Review of Systems        Objective   Physical Exam  Right ear is completely occluded with cerumen  Using the binocular microscope for visualization, cerumen impaction was removed from right ear canal(s) using instrumentation. This was personally performed by Snoido Moyer MD  Following cerumen removal tympanic membrane is noted be intact and clear  Left ear shows some partially obstructing wax that is removed as a courtesy.  Beyond this tympanic membrane is intact no infection or effusion  Nares: Boggy mucosa but no discharge or purulence seen on anterior rhinoscopy  Oral cavity: No masses or lesions  Pharynx: No erythema or exudate  Neck: No adenopathy or mass    Assessment/Plan   Diagnoses and all orders for this visit:    1. Chronic rhinitis (Primary)    2. Impacted cerumen of right ear    Other orders  -     fluticasone (FLONASE) 50 MCG/ACT nasal spray; 2 sprays by Each Nare route Daily.  Dispense: 48 mL; Refill: 3        Plan: Cerumen removed as described above.  Told her she needs to continue her Flonase daily and add nasal saline spray as needed for congestion.  As " long as she does not develop purulent rhinorrhea she should not need antibiotics for this.  See me in 1 year or call sooner for problems.

## 2022-03-01 RX ORDER — ATORVASTATIN CALCIUM 40 MG/1
TABLET, FILM COATED ORAL
Qty: 90 TABLET | Refills: 0 | Status: SHIPPED | OUTPATIENT
Start: 2022-03-01 | End: 2022-04-18

## 2022-04-18 RX ORDER — LOSARTAN POTASSIUM AND HYDROCHLOROTHIAZIDE 25; 100 MG/1; MG/1
TABLET ORAL
Qty: 90 TABLET | Refills: 0 | Status: SHIPPED | OUTPATIENT
Start: 2022-04-18 | End: 2022-07-05

## 2022-04-18 RX ORDER — ATORVASTATIN CALCIUM 40 MG/1
TABLET, FILM COATED ORAL
Qty: 90 TABLET | Refills: 0 | Status: SHIPPED | OUTPATIENT
Start: 2022-04-18 | End: 2022-07-05

## 2022-04-26 DIAGNOSIS — Z12.31 SCREENING MAMMOGRAM, ENCOUNTER FOR: Primary | ICD-10-CM

## 2022-06-15 ENCOUNTER — LAB (OUTPATIENT)
Dept: LAB | Facility: HOSPITAL | Age: 77
End: 2022-06-15

## 2022-06-15 ENCOUNTER — OFFICE VISIT (OUTPATIENT)
Dept: FAMILY MEDICINE CLINIC | Facility: CLINIC | Age: 77
End: 2022-06-15

## 2022-06-15 VITALS
HEIGHT: 64 IN | HEART RATE: 84 BPM | OXYGEN SATURATION: 95 % | DIASTOLIC BLOOD PRESSURE: 64 MMHG | BODY MASS INDEX: 34.12 KG/M2 | SYSTOLIC BLOOD PRESSURE: 128 MMHG

## 2022-06-15 DIAGNOSIS — E11.41 TYPE 2 DIABETES MELLITUS WITH DIABETIC MONONEUROPATHY, WITHOUT LONG-TERM CURRENT USE OF INSULIN: ICD-10-CM

## 2022-06-15 DIAGNOSIS — E03.9 ACQUIRED HYPOTHYROIDISM: ICD-10-CM

## 2022-06-15 DIAGNOSIS — L98.8 EROSIVE PUSTULAR DERMATOSIS OF SCALP: ICD-10-CM

## 2022-06-15 DIAGNOSIS — I10 ESSENTIAL HYPERTENSION: Primary | ICD-10-CM

## 2022-06-15 DIAGNOSIS — I10 ESSENTIAL HYPERTENSION: ICD-10-CM

## 2022-06-15 LAB
ALBUMIN SERPL-MCNC: 4.3 G/DL (ref 3.5–5.2)
ALBUMIN/GLOB SERPL: 1.6 G/DL
ALP SERPL-CCNC: 96 U/L (ref 39–117)
ALT SERPL W P-5'-P-CCNC: 12 U/L (ref 1–33)
ANION GAP SERPL CALCULATED.3IONS-SCNC: 12 MMOL/L (ref 5–15)
AST SERPL-CCNC: 16 U/L (ref 1–32)
BILIRUB SERPL-MCNC: 0.4 MG/DL (ref 0–1.2)
BUN SERPL-MCNC: 10 MG/DL (ref 8–23)
BUN/CREAT SERPL: 13.7 (ref 7–25)
CALCIUM SPEC-SCNC: 9.4 MG/DL (ref 8.6–10.5)
CHLORIDE SERPL-SCNC: 100 MMOL/L (ref 98–107)
CO2 SERPL-SCNC: 24 MMOL/L (ref 22–29)
CREAT SERPL-MCNC: 0.73 MG/DL (ref 0.57–1)
EGFRCR SERPLBLD CKD-EPI 2021: 86.4 ML/MIN/1.73
GLOBULIN UR ELPH-MCNC: 2.7 GM/DL
GLUCOSE SERPL-MCNC: 85 MG/DL (ref 65–99)
HBA1C MFR BLD: 6.6 % (ref 4.8–5.6)
POTASSIUM SERPL-SCNC: 4.8 MMOL/L (ref 3.5–5.2)
PROT SERPL-MCNC: 7 G/DL (ref 6–8.5)
SODIUM SERPL-SCNC: 136 MMOL/L (ref 136–145)
TSH SERPL DL<=0.05 MIU/L-ACNC: 2.5 UIU/ML (ref 0.27–4.2)

## 2022-06-15 PROCEDURE — 84443 ASSAY THYROID STIM HORMONE: CPT

## 2022-06-15 PROCEDURE — 83036 HEMOGLOBIN GLYCOSYLATED A1C: CPT

## 2022-06-15 PROCEDURE — 80053 COMPREHEN METABOLIC PANEL: CPT

## 2022-06-15 PROCEDURE — 99214 OFFICE O/P EST MOD 30 MIN: CPT | Performed by: FAMILY MEDICINE

## 2022-06-15 PROCEDURE — 36415 COLL VENOUS BLD VENIPUNCTURE: CPT

## 2022-06-16 ENCOUNTER — TELEPHONE (OUTPATIENT)
Dept: FAMILY MEDICINE CLINIC | Facility: CLINIC | Age: 77
End: 2022-06-16

## 2022-06-16 NOTE — TELEPHONE ENCOUNTER
Per Dr. Hardin, Ms. Esposito has been called with recent lab results & recommendations.  Continue current medications and follow-up as planned or sooner if any problems.       ----- Message from Diamond Hardin MD sent at 6/15/2022  7:55 PM CDT -----  Labs ok.  HgbA1c stable at 6.6%.  Thanks, BETH Hardin

## 2022-06-16 NOTE — PROGRESS NOTES
Per Dr. Hardin, Ms. Esposito has been called with recent lab results & recommendations.  Continue current medications and follow-up as planned or sooner if any problems.

## 2022-06-22 ENCOUNTER — OFFICE VISIT (OUTPATIENT)
Dept: OBSTETRICS AND GYNECOLOGY | Facility: CLINIC | Age: 77
End: 2022-06-22

## 2022-06-22 VITALS
DIASTOLIC BLOOD PRESSURE: 70 MMHG | HEIGHT: 64 IN | SYSTOLIC BLOOD PRESSURE: 150 MMHG | WEIGHT: 202 LBS | BODY MASS INDEX: 34.49 KG/M2

## 2022-06-22 DIAGNOSIS — Z12.31 ENCOUNTER FOR SCREENING MAMMOGRAM FOR BREAST CANCER: ICD-10-CM

## 2022-06-22 DIAGNOSIS — Z01.419 ENCOUNTER FOR ANNUAL ROUTINE GYNECOLOGICAL EXAMINATION: Primary | ICD-10-CM

## 2022-06-22 PROCEDURE — 99397 PER PM REEVAL EST PAT 65+ YR: CPT | Performed by: NURSE PRACTITIONER

## 2022-06-22 RX ORDER — CLOBETASOL PROPIONATE 0.5 MG/G
CREAM TOPICAL
COMMUNITY
Start: 2022-06-13

## 2022-06-22 NOTE — PROGRESS NOTES
Subjective   Sharon Esposito is a 76 y.o. here for gyn annual    Mammo: 2021 Normal  Dexa: 2021 Normal bone mineral density of the lumbar spine. Osteopenia of the bilateral femoral necks with normal bone  mineral density of the bilateral hips in total.  Pa: 2016 Normal    Sharon Esposito is a 76 yr old  postmenopausal female. No gyn complaints. Reports that her mother had breast cancer from prolonged use of HRT and is wondering if she can continue mammogram screening past 75 years of age. Pt lives on her own. Has her , Royce. Continues to drive.  Patient reports that she is not currently experiencing any symptoms of urinary incontinence.        Gynecologic Exam  The patient's pertinent negatives include no genital itching, genital lesions, genital odor, genital rash, missed menses, pelvic pain, vaginal bleeding or vaginal discharge. Pertinent negatives include no abdominal pain, chills, constipation, diarrhea, dysuria, fever, frequency, nausea or rash. She is not sexually active. She is postmenopausal.       The following portions of the patient's history were reviewed and updated as appropriate: allergies, current medications, past family history, past medical history, past social history, past surgical history and problem list.    Review of Systems   Constitutional: Negative for chills, fatigue, fever, unexpected weight gain and unexpected weight loss.   Respiratory: Negative for shortness of breath.    Cardiovascular: Negative for chest pain and palpitations.   Gastrointestinal: Negative for abdominal pain, constipation, diarrhea and nausea.   Endocrine: Negative for cold intolerance and heat intolerance.   Genitourinary: Negative for breast discharge, breast lump, breast pain, difficulty urinating, dysuria, frequency, missed menses, pelvic pain, pelvic pressure, urinary incontinence, vaginal bleeding, vaginal discharge and vaginal pain.   Skin: Negative for rash.   Neurological: Negative  for headache.   Psychiatric/Behavioral: Negative for sleep disturbance, depressed mood and stress.       Objective   Physical Exam  Vitals and nursing note reviewed. Exam conducted with a chaperone present.   Constitutional:       Appearance: She is well-developed.   HENT:      Head: Normocephalic and atraumatic.   Neck:      Thyroid: No thyromegaly.   Cardiovascular:      Rate and Rhythm: Normal rate and regular rhythm.      Heart sounds: Normal heart sounds.   Pulmonary:      Effort: Pulmonary effort is normal. No respiratory distress.      Breath sounds: Normal breath sounds.   Chest:   Breasts:      Right: Normal.      Left: Normal.       Abdominal:      General: There is no distension.      Palpations: Abdomen is soft.      Tenderness: There is no abdominal tenderness.   Genitourinary:     General: Normal vulva.      Vagina: Normal.      Cervix: Normal.      Uterus: Normal.       Adnexa: Right adnexa normal and left adnexa normal.      Rectum: Normal.   Musculoskeletal:         General: Normal range of motion.      Cervical back: Normal range of motion and neck supple.   Skin:     General: Skin is warm and dry.   Neurological:      Mental Status: She is alert and oriented to person, place, and time.   Psychiatric:         Behavior: Behavior normal.           Assessment & Plan   Diagnoses and all orders for this visit:    1. Encounter for annual routine gynecological examination (Primary)    2. Encounter for screening mammogram for breast cancer        Discussed breast cancer screening past 75 years of age and I believe patient is a good candidate to continue screening is she, overall, has good health. Return in 2 years for gyn annual and may continue yearly mammogram ( pt desires to continue as long as insurance will pay for it).

## 2022-06-27 DIAGNOSIS — M85.851 OSTEOPENIA OF FEMORAL NECK, BILATERAL: ICD-10-CM

## 2022-06-27 DIAGNOSIS — Z13.820 SCREENING FOR OSTEOPOROSIS: Primary | ICD-10-CM

## 2022-06-27 DIAGNOSIS — M85.852 OSTEOPENIA OF FEMORAL NECK, BILATERAL: ICD-10-CM

## 2022-06-27 DIAGNOSIS — Z12.31 SCREENING MAMMOGRAM, ENCOUNTER FOR: ICD-10-CM

## 2022-07-05 RX ORDER — ATORVASTATIN CALCIUM 40 MG/1
TABLET, FILM COATED ORAL
Qty: 90 TABLET | Refills: 30 | Status: SHIPPED | OUTPATIENT
Start: 2022-07-05

## 2022-07-05 RX ORDER — LOSARTAN POTASSIUM AND HYDROCHLOROTHIAZIDE 25; 100 MG/1; MG/1
TABLET ORAL
Qty: 90 TABLET | Refills: 30 | Status: SHIPPED | OUTPATIENT
Start: 2022-07-05

## 2022-08-05 PROCEDURE — 87635 SARS-COV-2 COVID-19 AMP PRB: CPT | Performed by: NURSE PRACTITIONER

## 2022-08-10 ENCOUNTER — OFFICE VISIT (OUTPATIENT)
Dept: OTOLARYNGOLOGY | Facility: CLINIC | Age: 77
End: 2022-08-10

## 2022-08-10 VITALS — HEIGHT: 59 IN | OXYGEN SATURATION: 98 % | WEIGHT: 197.2 LBS | BODY MASS INDEX: 39.76 KG/M2 | TEMPERATURE: 97.3 F

## 2022-08-10 DIAGNOSIS — J34.2 NASAL SEPTAL DEFORMITY: ICD-10-CM

## 2022-08-10 DIAGNOSIS — J31.0 CHRONIC RHINITIS: Primary | ICD-10-CM

## 2022-08-10 PROCEDURE — 31231 NASAL ENDOSCOPY DX: CPT | Performed by: OTOLARYNGOLOGY

## 2022-08-10 PROCEDURE — 99213 OFFICE O/P EST LOW 20 MIN: CPT | Performed by: OTOLARYNGOLOGY

## 2022-08-10 NOTE — PROGRESS NOTES
Subjective   Sharon Esposito is a 76 y.o. female.       History of Present Illness   Is a patient I follow with chronic rhinitis.  Asked for a work in today because she has been feeling bad for several months.  Initially she says everything in her head hurts including her teeth.  Says she has not been having any rhinorrhea but assumes this was a nasal or sinus problem or an allergy problem.  Wonders if exposure to some kind of allergen has caused this.  She has been seen on at least 2 occasions in urgent care most recently 8/5/2022 at which time she had x-ray of the sinuses which was negative and was given a Z-William and 5 days of prednisone.  After talking to her while her primary complaint is not actually a specific sinonasal complaint but that she feels hot all the time and that she just generally feels somewhat bad.        The following portions of the patient's history were reviewed and updated as appropriate: allergies, current medications, past family history, past medical history, past social history, past surgical history and problem list.     reports that she has never smoked. She has never used smokeless tobacco. She reports that she does not drink alcohol and does not use drugs.   Patient is not a tobacco user and has not been counseled for use of tobacco products      Review of Systems        Objective   Physical Exam  Ears no discharge  Nares: Boggy mucosa septum to the left  Oral cavity: No masses or lesions  Pharynx: No erythema exudate or mass  Neck: No lymphadenopathy.  No thyromegaly.  Trachea and larynx midline.  No masses in the parotid or submandibular glands.  TMJs are nontender  Nasal endoscopy is performed: Leighton-Synephrine and Xylocaine are instilled the nares bilaterally.  0° scope is passed into each nostril.  The inferior, middle, and superior turbinates as well as nasal septum and nasopharynx are examined.  Pertinent findings include: Septal deformity to the left but no evidence of  "mass polyp or purulence in the middle meatal clefts that would indicate sinus infection or significant inflammation.       Assessment and Plan   Diagnoses and all orders for this visit:    1. Chronic rhinitis (Primary)    2. Nasal septal deformity               Plan: Reassured the patient that her sinuses were not the cause of her symptoms.  She asked if it could be \"an allergy\" and I explained to her that allergic reaction would typically only last for a day or 2 at most not 4 months.  I suggested she discuss this further with her family doctor, Dr. Hardin.  See me again at her regularly scheduled visit in January.  "

## 2022-08-17 ENCOUNTER — OFFICE VISIT (OUTPATIENT)
Dept: FAMILY MEDICINE CLINIC | Facility: CLINIC | Age: 77
End: 2022-08-17

## 2022-08-17 VITALS
TEMPERATURE: 98.7 F | SYSTOLIC BLOOD PRESSURE: 122 MMHG | RESPIRATION RATE: 26 BRPM | WEIGHT: 197.4 LBS | HEIGHT: 59 IN | DIASTOLIC BLOOD PRESSURE: 70 MMHG | BODY MASS INDEX: 39.8 KG/M2 | OXYGEN SATURATION: 96 % | HEART RATE: 81 BPM

## 2022-08-17 DIAGNOSIS — J30.2 SEASONAL ALLERGIES: Primary | ICD-10-CM

## 2022-08-17 PROCEDURE — 99213 OFFICE O/P EST LOW 20 MIN: CPT | Performed by: NURSE PRACTITIONER

## 2022-08-17 NOTE — PROGRESS NOTES
Chief Complaint  Sinus issues    Subjective          Sharon Esposito presents to [facility] - [place] with issues of allergies that seem to be getting worse. Was recently seen by ENT and was told her sinuses were okay and that she is having issues with allergies. She would like to talk to a specialist regarding her allergies.       Allergic Reaction  This is a chronic problem. The current episode started more than 1 week ago. The problem has been gradually worsening since onset. The problem is moderate. It is unknown what she was exposed to.     Outpatient Medications Prior to Visit   Medication Sig Dispense Refill   • atorvastatin (LIPITOR) 40 MG tablet TAKE 1 TABLET BY MOUTH EVERY DAY 90 tablet 30   • Blood Glucose Monitoring Suppl (ONE TOUCH ULTRA SYSTEM KIT) W/DEVICE kit Use for Home Glucose Monitoring 1 each 0   • clobetasol (TEMOVATE) 0.05 % cream APPLY TO AFFECTED AREA ON SCALP TWICE A DAY     • Cyanocobalamin (VITAMIN B-12) 1000 MCG sublingual tablet Place 1 tablet under your tongue daily for the rest of your life. (Patient taking differently: Place 1 tablet under the tongue Daily. Place 1 tablet under your tongue daily for the rest of your life.) 100 each 4   • fluticasone (FLONASE) 50 MCG/ACT nasal spray 2 sprays by Each Nare route Daily. 48 mL 3   • guaiFENesin (MUCINEX) 600 MG 12 hr tablet Take 1 tablet by mouth 2 (Two) Times a Day. 30 tablet 0   • ibuprofen (ADVIL,MOTRIN) 200 MG tablet Take 200 mg by mouth Every 6 (Six) Hours As Needed for Mild Pain .     • Lancets (OneTouch Delica Plus [id number]) misc 1 each by Other route Daily. Dx: E11.9 100 each 3   • latanoprost (XALATAN) 0.005 % ophthalmic solution INSTILL 1 DROP INTO AFFECTED EYE EVERY DAY IN THE EVENING     • levothyroxine (SYNTHROID, LEVOTHROID) 88 MCG tablet Take 1 tablet by mouth Daily. 90 tablet 3   • Loratadine 10 MG capsule Take 1 tablet by mouth Daily As Needed.     •  "losartan-hydrochlorothiazide (HYZAAR) 100-25 MG per tablet TAKE 1 TABLET BY MOUTH EVERY DAY 90 tablet 30   • metFORMIN ER (GLUCOPHAGE-XR) 500 MG 24 hr tablet TAKE 2 TABLETS BY MOUTH DAILY BEFORE SUPPER. 180 tablet 3   • metoprolol tartrate (LOPRESSOR) 25 MG tablet TAKE 1/2 TABLET BY MOUTH TWICE A DAY 90 tablet 3   • mupirocin (BACTROBAN) 2 % ointment APPLY A SMALL AMOUNT TO THE AFFECTED AREA FOR LARGE LESION ON SCALP 3 TIMES DAILY FOR 7 DAYS     • OneTouch Ultra test strip TEST BLOOD SUGARS ONCE DAILY AS INSTRUCTED BY YOUR PROVIDER 100 each 3   • timolol (TIMOPTIC) 0.5 % ophthalmic solution      • vitamin C (ASCORBIC ACID) 500 MG tablet Take 500 mg by mouth Daily.       No facility-administered medications prior to visit.       Review of Systems      Objective   Vital Signs:   Visit Vitals  /70 (BP Location: Left arm, Patient Position: Sitting, Cuff Size: Adult)   Pulse 81   Temp 98.7 °F (37.1 °C) (Tympanic)   Resp 26   Ht 149.9 cm (59\")   Wt 89.5 kg (197 lb 6.4 oz)   LMP  (LMP Unknown)   SpO2 96%   BMI 39.87 kg/m²     Physical Exam  Vitals and nursing note reviewed.   Constitutional:       Appearance: She is well-developed.   HENT:      Head: Normocephalic and atraumatic.      Nose: Rhinorrhea present.   Eyes:      General: Lids are normal.      Conjunctiva/sclera: Conjunctivae normal.      Comments: Under eyes swollen   Neck:      Thyroid: No thyroid mass or thyromegaly.      Trachea: Trachea normal. No tracheal tenderness.   Cardiovascular:      Rate and Rhythm: Normal rate.      Pulses: Normal pulses.      Heart sounds: Normal heart sounds.   Pulmonary:      Effort: Pulmonary effort is normal. No respiratory distress.      Breath sounds: Normal breath sounds. No wheezing.   Abdominal:      General: There is no distension.      Palpations: Abdomen is soft. There is no mass.   Musculoskeletal:         General: Normal range of motion.      Cervical back: Normal range of motion. No edema.   Skin:     General: " Skin is warm and dry.      Coloration: Skin is not pale.      Findings: No abrasion, erythema or lesion.   Neurological:      Mental Status: She is alert and oriented to person, place, and time.   Psychiatric:         Mood and Affect: Mood is not anxious. Affect is not inappropriate.         Speech: Speech normal.         Behavior: Behavior normal.         Thought Content: Thought content normal.         Judgment: Judgment normal. Judgment is not impulsive.        Result Review :                 Assessment and Plan    Diagnoses and all orders for this visit:    1. Seasonal allergies (Primary)  -     Ambulatory Referral to Allergy      Referral placed with allergy specialist     Discussed switching to a different allergy medication, she will try this.    Continue to use flonase as well mucinex    Avoid staying outdoors for long periods of time, as this seems to flare things up for her       Follow Up {Instructions Charge Capture  Follow-up Communications :23}  Return if symptoms worsen or fail to improve, for Next scheduled follow up.  Patient was given instructions and counseling regarding her condition or for health maintenance advice. Please see specific information pulled into the AVS if appropriate.           This document has been electronically signed by LEXI Wilde on August 19, 2022 10:09 CDT

## 2022-09-12 ENCOUNTER — OFFICE VISIT (OUTPATIENT)
Dept: FAMILY MEDICINE CLINIC | Facility: CLINIC | Age: 77
End: 2022-09-12

## 2022-09-12 ENCOUNTER — TRANSCRIBE ORDERS (OUTPATIENT)
Dept: LAB | Facility: HOSPITAL | Age: 77
End: 2022-09-12

## 2022-09-12 ENCOUNTER — TRANSCRIBE ORDERS (OUTPATIENT)
Dept: GENERAL RADIOLOGY | Facility: CLINIC | Age: 77
End: 2022-09-12

## 2022-09-12 ENCOUNTER — LAB (OUTPATIENT)
Dept: LAB | Facility: HOSPITAL | Age: 77
End: 2022-09-12

## 2022-09-12 VITALS
HEART RATE: 81 BPM | OXYGEN SATURATION: 97 % | SYSTOLIC BLOOD PRESSURE: 112 MMHG | HEIGHT: 59 IN | DIASTOLIC BLOOD PRESSURE: 80 MMHG | BODY MASS INDEX: 39.92 KG/M2 | WEIGHT: 198 LBS

## 2022-09-12 DIAGNOSIS — R05.1 ACUTE COUGH: Primary | ICD-10-CM

## 2022-09-12 DIAGNOSIS — E11.41 TYPE 2 DIABETES MELLITUS WITH DIABETIC MONONEUROPATHY, WITHOUT LONG-TERM CURRENT USE OF INSULIN: ICD-10-CM

## 2022-09-12 DIAGNOSIS — I10 ESSENTIAL HYPERTENSION: Primary | ICD-10-CM

## 2022-09-12 DIAGNOSIS — R68.89 HEAT INTOLERANCE: ICD-10-CM

## 2022-09-12 DIAGNOSIS — R53.1 WEAKNESS GENERALIZED: ICD-10-CM

## 2022-09-12 DIAGNOSIS — J30.2 SEASONAL ALLERGIES: ICD-10-CM

## 2022-09-12 DIAGNOSIS — E03.9 ACQUIRED HYPOTHYROIDISM: ICD-10-CM

## 2022-09-12 DIAGNOSIS — R29.898 BILATERAL LEG WEAKNESS: ICD-10-CM

## 2022-09-12 DIAGNOSIS — R53.1 WEAKNESS GENERALIZED: Primary | ICD-10-CM

## 2022-09-12 LAB
ALBUMIN SERPL-MCNC: 4.1 G/DL (ref 3.5–5.2)
ALBUMIN/GLOB SERPL: 1.5 G/DL
ALP SERPL-CCNC: 87 U/L (ref 39–117)
ALT SERPL W P-5'-P-CCNC: 13 U/L (ref 1–33)
ANION GAP SERPL CALCULATED.3IONS-SCNC: 11.6 MMOL/L (ref 5–15)
AST SERPL-CCNC: 14 U/L (ref 1–32)
BILIRUB SERPL-MCNC: 0.5 MG/DL (ref 0–1.2)
BUN SERPL-MCNC: 9 MG/DL (ref 8–23)
BUN/CREAT SERPL: 11.5 (ref 7–25)
CALCIUM SPEC-SCNC: 9.7 MG/DL (ref 8.6–10.5)
CHLORIDE SERPL-SCNC: 101 MMOL/L (ref 98–107)
CO2 SERPL-SCNC: 27.4 MMOL/L (ref 22–29)
CREAT SERPL-MCNC: 0.78 MG/DL (ref 0.57–1)
DEPRECATED RDW RBC AUTO: 48 FL (ref 37–54)
EGFRCR SERPLBLD CKD-EPI 2021: 79.8 ML/MIN/1.73
ERYTHROCYTE [DISTWIDTH] IN BLOOD BY AUTOMATED COUNT: 15.6 % (ref 12.3–15.4)
GLOBULIN UR ELPH-MCNC: 2.8 GM/DL
GLUCOSE SERPL-MCNC: 95 MG/DL (ref 65–99)
HCT VFR BLD AUTO: 36.3 % (ref 34–46.6)
HGB BLD-MCNC: 12 G/DL (ref 12–15.9)
MCH RBC QN AUTO: 28.2 PG (ref 26.6–33)
MCHC RBC AUTO-ENTMCNC: 33.1 G/DL (ref 31.5–35.7)
MCV RBC AUTO: 85.2 FL (ref 79–97)
PLATELET # BLD AUTO: 393 10*3/MM3 (ref 140–450)
PMV BLD AUTO: 9.5 FL (ref 6–12)
POTASSIUM SERPL-SCNC: 4.5 MMOL/L (ref 3.5–5.2)
PROT SERPL-MCNC: 6.9 G/DL (ref 6–8.5)
RBC # BLD AUTO: 4.26 10*6/MM3 (ref 3.77–5.28)
SODIUM SERPL-SCNC: 140 MMOL/L (ref 136–145)
TSH SERPL DL<=0.05 MIU/L-ACNC: 1.76 UIU/ML (ref 0.27–4.2)
VIT B12 BLD-MCNC: 1246 PG/ML (ref 211–946)
WBC NRBC COR # BLD: 9.97 10*3/MM3 (ref 3.4–10.8)

## 2022-09-12 PROCEDURE — 84443 ASSAY THYROID STIM HORMONE: CPT

## 2022-09-12 PROCEDURE — 36415 COLL VENOUS BLD VENIPUNCTURE: CPT

## 2022-09-12 PROCEDURE — 99214 OFFICE O/P EST MOD 30 MIN: CPT | Performed by: FAMILY MEDICINE

## 2022-09-12 PROCEDURE — 85027 COMPLETE CBC AUTOMATED: CPT

## 2022-09-12 PROCEDURE — 80053 COMPREHEN METABOLIC PANEL: CPT

## 2022-09-12 PROCEDURE — 85007 BL SMEAR W/DIFF WBC COUNT: CPT

## 2022-09-12 PROCEDURE — 82607 VITAMIN B-12: CPT

## 2022-09-12 RX ORDER — FEXOFENADINE HYDROCHLORIDE 60 MG/1
60 TABLET, FILM COATED ORAL DAILY
COMMUNITY

## 2022-09-12 NOTE — PROGRESS NOTES
Chief Complaint  Hypertension    Subjective    History of Present Illness {CC  Problem List  Visit  Diagnosis   Encounters  Notes  Medications  Labs  Result Review Imaging  Media :23}     Sharon Esposito presents to [facility] - [place] for     Chief Complaint   Patient presents with   • Hypertension      Patient seen today for follow-up.  Has chronic medical problems including hypertension and diabetes.  Blood pressures been doing well.  Patient is compliant with medications.   blood glucose levels have been controlled, patient says that fasting glucose is between .  No hypoglycemia.    Patient has new complaint of leg weakness.  Says that this started about 6 months ago.  Was gradually getting worse.  Patient says symptoms got worse over the last couple of months.  She was seen at the urgent care on 7/26/2022, and diagnosed with sinusitis, seen again on 8/5/2022 and diagnosed with recurrent sinus infection.  Treated with corticosteroids and antibiotics at these visits.  Did not notice much improvement.  She was seen by APRN in our office on 8/17/2022.  Allergy medication changed to Allegra, and patient recommended to follow-up with allergy provider.  Has seen allergy provider for consultation.  Labs checked and chest x-ray done, these orders are pending.  She was recommended to follow back up with PCP.  Patient says over the last few weeks she has been feeling some better.  Associated symptoms to the leg weakness include feeling very hot all over.  Says that cold ice and ice cream are the only things that have helped with this.  She denies any chest pain, palpitations, shortness of breath, syncope, change in leg swelling or paroxysmal nocturnal dyspnea.      Current Outpatient Medications:   •  atorvastatin (LIPITOR) 40 MG tablet, TAKE 1 TABLET BY MOUTH EVERY DAY, Disp: 90 tablet, Rfl: 30  •  Blood Glucose Monitoring Suppl (ONE TOUCH ULTRA SYSTEM  KIT) W/DEVICE kit, Use for Home Glucose Monitoring, Disp: 1 each, Rfl: 0  •  clobetasol (TEMOVATE) 0.05 % cream, APPLY TO AFFECTED AREA ON SCALP TWICE A DAY, Disp: , Rfl:   •  Cyanocobalamin (VITAMIN B-12) 1000 MCG sublingual tablet, Place 1 tablet under your tongue daily for the rest of your life. (Patient taking differently: Place 1 tablet under the tongue Daily. Place 1 tablet under your tongue daily for the rest of your life.), Disp: 100 each, Rfl: 4  •  fexofenadine (ALLEGRA) 60 MG tablet, Take 60 mg by mouth Daily., Disp: , Rfl:   •  fluticasone (FLONASE) 50 MCG/ACT nasal spray, 2 sprays by Each Nare route Daily., Disp: 48 mL, Rfl: 3  •  guaiFENesin (MUCINEX) 600 MG 12 hr tablet, Take 1 tablet by mouth 2 (Two) Times a Day., Disp: 30 tablet, Rfl: 0  •  ibuprofen (ADVIL,MOTRIN) 200 MG tablet, Take 200 mg by mouth Every 6 (Six) Hours As Needed for Mild Pain ., Disp: , Rfl:   •  Lancets (OneTouch Delica Plus [id number]) misc, 1 each by Other route Daily. Dx: E11.9, Disp: 100 each, Rfl: 3  •  latanoprost (XALATAN) 0.005 % ophthalmic solution, INSTILL 1 DROP INTO AFFECTED EYE EVERY DAY IN THE EVENING, Disp: , Rfl:   •  levothyroxine (SYNTHROID, LEVOTHROID) 88 MCG tablet, Take 1 tablet by mouth Daily., Disp: 90 tablet, Rfl: 3  •  losartan-hydrochlorothiazide (HYZAAR) 100-25 MG per tablet, TAKE 1 TABLET BY MOUTH EVERY DAY, Disp: 90 tablet, Rfl: 30  •  metFORMIN ER (GLUCOPHAGE-XR) 500 MG 24 hr tablet, TAKE 2 TABLETS BY MOUTH DAILY BEFORE SUPPER., Disp: 180 tablet, Rfl: 3  •  metoprolol tartrate (LOPRESSOR) 25 MG tablet, TAKE 1/2 TABLET BY MOUTH TWICE A DAY, Disp: 90 tablet, Rfl: 3  •  mupirocin (BACTROBAN) 2 % ointment, APPLY A SMALL AMOUNT TO THE AFFECTED AREA FOR LARGE LESION ON SCALP 3 TIMES DAILY FOR 7 DAYS, Disp: , Rfl:   •  OneTouch Ultra test strip, TEST BLOOD SUGARS ONCE DAILY AS INSTRUCTED BY YOUR PROVIDER, Disp: 100 each, Rfl: 3  •  timolol (TIMOPTIC) 0.5 % ophthalmic solution, , Disp: , Rfl:   •  vitamin C  "(ASCORBIC ACID) 500 MG tablet, Take 500 mg by mouth Daily., Disp: , Rfl:   •  Loratadine 10 MG capsule, Take 1 tablet by mouth Daily As Needed., Disp: , Rfl:      Objective       Vital Signs:   /80   Pulse 81   Ht 149.9 cm (59\")   Wt 89.8 kg (198 lb)   SpO2 97%   BMI 39.99 kg/m²     Physical Exam  Vitals reviewed.   Constitutional:       General: She is not in acute distress.     Appearance: She is well-developed.   Cardiovascular:      Rate and Rhythm: Normal rate and regular rhythm.      Heart sounds: Normal heart sounds. No murmur heard.  Pulmonary:      Effort: Pulmonary effort is normal. No respiratory distress.      Breath sounds: Normal breath sounds. No wheezing or rales.   Abdominal:      Palpations: Abdomen is soft.      Tenderness: There is no abdominal tenderness.   Skin:     General: Skin is warm and dry.      Findings: No rash.   Neurological:      Mental Status: She is alert and oriented to person, place, and time.        Result Review :{ Labs  Result Review  Imaging  Med Tab  Media :23}   The following data was reviewed by: Diamond Hardin MD on 09/12/2022    Common labs    Common Labsle 12/15/21 12/15/21 12/15/21 12/15/21 6/15/22 6/15/22    1024 1024 1024 1024 1019 1019   Glucose    95 85    BUN    11 10    Creatinine    0.72 0.73    eGFR Non African Am    79     Sodium    142 136    Potassium    4.8 4.8    Chloride    103 100    Calcium    10.1 9.4    Albumin    4.20 4.30    Total Bilirubin    0.6 0.4    Alkaline Phosphatase    87 96    AST (SGOT)    17 16    ALT (SGPT)    13 12    WBC 8.66        Hemoglobin 12.5        Hematocrit 38.3        Platelets 361        Total Cholesterol   140      Triglycerides   166 (A)      HDL Cholesterol   63 (A)      LDL Cholesterol    50      Hemoglobin A1C  6.34 (A)    6.60 (A)   (A) Abnormal value                    Assessment and Plan {CC Problem List  Visit Diagnosis  ROS  Review (Popup)  Health Maintenance  Quality  BestPractice  " Medications  SmartSets  SnapShot Encounters  Media :23}   Diagnoses and all orders for this visit:    1. Essential hypertension (Primary)    2. Type 2 diabetes mellitus with diabetic mononeuropathy, without long-term current use of insulin (HCC)    3. Seasonal allergies    4. Acquired hypothyroidism    5. Heat intolerance    6. Bilateral leg weakness       Patient seen today for follow-up  Hypertension and diabetes controlled, continue current medications  Feels better with Allegra, continue this medication along with Flonase for seasonal allergies  Had lab work done for allergist, will check on these tomorrow  Agree that heat intolerance and bilateral leg weakness are less likely to be related to allergy symptoms  Symptoms are improving  May have been related to viral infection  Patient advised to continue monitoring  If symptoms persist, will pursue additional cardiac evaluation      Follow Up {Instructions Charge Capture  Follow-up Communications :23}   Return in about 5 weeks (around 10/17/2022) for Recheck.  Patient was given instructions and counseling regarding her condition or for health maintenance advice. Please see specific information pulled into the AVS if appropriate.            This document has been electronically signed by iDamond Hardin MD

## 2022-09-13 LAB
BASOPHILS # BLD MANUAL: 0.2 10*3/MM3 (ref 0–0.2)
BASOPHILS NFR BLD MANUAL: 2 % (ref 0–1.5)
EOSINOPHIL # BLD MANUAL: 0.4 10*3/MM3 (ref 0–0.4)
EOSINOPHIL NFR BLD MANUAL: 4 % (ref 0.3–6.2)
LYMPHOCYTES # BLD MANUAL: 2.79 10*3/MM3 (ref 0.7–3.1)
LYMPHOCYTES NFR BLD MANUAL: 6 % (ref 5–12)
MONOCYTES # BLD: 0.6 10*3/MM3 (ref 0.1–0.9)
NEUTROPHILS # BLD AUTO: 5.98 10*3/MM3 (ref 1.7–7)
NEUTROPHILS NFR BLD MANUAL: 60 % (ref 42.7–76)
PLAT MORPH BLD: NORMAL
RBC MORPH BLD: NORMAL
VARIANT LYMPHS NFR BLD MANUAL: 28 % (ref 19.6–45.3)
WBC MORPH BLD: NORMAL

## 2022-09-21 ENCOUNTER — TELEPHONE (OUTPATIENT)
Dept: FAMILY MEDICINE CLINIC | Facility: CLINIC | Age: 77
End: 2022-09-21

## 2022-09-21 NOTE — TELEPHONE ENCOUNTER
These went back to the ordering physician (Dr. Richard) which is why no result from our office.  Lungs clear on chest Xray.  Labs all ok.  Thanks, BETH Hardin

## 2022-09-21 NOTE — TELEPHONE ENCOUNTER
Per Dr. Hardin, Ms. Esposito has been called with Chest x-ray results and recommendations.   She will need to call Dr Richard for the lab results

## 2022-09-21 NOTE — TELEPHONE ENCOUNTER
Pt was wanting all her results for lab, and x rays on the 12 th was told would get results next day but still waiting   Pt [phone]

## 2022-10-12 DIAGNOSIS — E11.9 DIABETES MELLITUS WITHOUT COMPLICATION: ICD-10-CM

## 2022-10-12 RX ORDER — BLOOD SUGAR DIAGNOSTIC
STRIP MISCELLANEOUS
Qty: 100 EACH | Refills: 3 | Status: SHIPPED | OUTPATIENT
Start: 2022-10-12

## 2022-10-12 RX ORDER — METFORMIN HYDROCHLORIDE 500 MG/1
1000 TABLET, EXTENDED RELEASE ORAL
Qty: 180 TABLET | Refills: 3 | Status: SHIPPED | OUTPATIENT
Start: 2022-10-12

## 2022-10-12 RX ORDER — LEVOTHYROXINE SODIUM 88 UG/1
TABLET ORAL
Qty: 90 TABLET | Refills: 3 | Status: SHIPPED | OUTPATIENT
Start: 2022-10-12

## 2022-11-01 ENCOUNTER — OFFICE VISIT (OUTPATIENT)
Dept: FAMILY MEDICINE CLINIC | Facility: CLINIC | Age: 77
End: 2022-11-01

## 2022-11-01 VITALS
SYSTOLIC BLOOD PRESSURE: 120 MMHG | DIASTOLIC BLOOD PRESSURE: 80 MMHG | HEART RATE: 85 BPM | HEIGHT: 59 IN | WEIGHT: 194 LBS | BODY MASS INDEX: 39.11 KG/M2 | OXYGEN SATURATION: 97 %

## 2022-11-01 DIAGNOSIS — I10 ESSENTIAL HYPERTENSION: ICD-10-CM

## 2022-11-01 DIAGNOSIS — E03.9 ACQUIRED HYPOTHYROIDISM: ICD-10-CM

## 2022-11-01 DIAGNOSIS — E11.9 DIABETES MELLITUS WITHOUT COMPLICATION: Primary | ICD-10-CM

## 2022-11-01 PROCEDURE — 99214 OFFICE O/P EST MOD 30 MIN: CPT | Performed by: FAMILY MEDICINE

## 2022-11-01 NOTE — PROGRESS NOTES
Chief Complaint  Hypertension    Subjective    History of Present Illness {CC  Problem List  Visit  Diagnosis   Encounters  Notes  Medications  Labs  Result Review Imaging  Media :23}     Sharon Esposito presents to [facility] - [place] for     Chief Complaint   Patient presents with   • Hypertension      Patient seen today for follow-up.  Has chronic medical problems including hypertension and diabetes.  Has been doing well since last visit.  Back to normal - no more leg weakness.  Allegra controlling allergy symptoms.  Hypertension controlled. Diabetes controlled.  Check blood glucose levels in the morning when fasting, normally ranging between .  No hypoglycemia.  She is adherent to medication regimen.       Current Outpatient Medications:   •  atorvastatin (LIPITOR) 40 MG tablet, TAKE 1 TABLET BY MOUTH EVERY DAY, Disp: 90 tablet, Rfl: 30  •  Blood Glucose Monitoring Suppl (ONE TOUCH ULTRA SYSTEM KIT) W/DEVICE kit, Use for Home Glucose Monitoring, Disp: 1 each, Rfl: 0  •  clobetasol (TEMOVATE) 0.05 % cream, APPLY TO AFFECTED AREA ON SCALP TWICE A DAY, Disp: , Rfl:   •  Cyanocobalamin (VITAMIN B-12) 1000 MCG sublingual tablet, Place 1 tablet under your tongue daily for the rest of your life. (Patient taking differently: Place 1 tablet under the tongue Daily. Place 1 tablet under your tongue daily for the rest of your life.), Disp: 100 each, Rfl: 4  •  fexofenadine (ALLEGRA) 60 MG tablet, Take 60 mg by mouth Daily., Disp: , Rfl:   •  fluticasone (FLONASE) 50 MCG/ACT nasal spray, 2 sprays by Each Nare route Daily., Disp: 48 mL, Rfl: 3  •  guaiFENesin (MUCINEX) 600 MG 12 hr tablet, Take 1 tablet by mouth 2 (Two) Times a Day., Disp: 30 tablet, Rfl: 0  •  ibuprofen (ADVIL,MOTRIN) 200 MG tablet, Take 200 mg by mouth Every 6 (Six) Hours As Needed for Mild Pain ., Disp: , Rfl:   •  Lancets (OneTouch Delica Plus [id number]) misc, 1 each by Other route  "Daily. Dx: E11.9, Disp: 100 each, Rfl: 3  •  latanoprost (XALATAN) 0.005 % ophthalmic solution, INSTILL 1 DROP INTO AFFECTED EYE EVERY DAY IN THE EVENING, Disp: , Rfl:   •  levothyroxine (SYNTHROID, LEVOTHROID) 88 MCG tablet, TAKE 1 TABLET BY MOUTH EVERY DAY, Disp: 90 tablet, Rfl: 3  •  Loratadine 10 MG capsule, Take 1 tablet by mouth Daily As Needed., Disp: , Rfl:   •  losartan-hydrochlorothiazide (HYZAAR) 100-25 MG per tablet, TAKE 1 TABLET BY MOUTH EVERY DAY, Disp: 90 tablet, Rfl: 30  •  metFORMIN ER (GLUCOPHAGE-XR) 500 MG 24 hr tablet, TAKE 2 TABLETS BY MOUTH DAILY BEFORE SUPPER., Disp: 180 tablet, Rfl: 3  •  metoprolol tartrate (LOPRESSOR) 25 MG tablet, TAKE 1/2 TABLET BY MOUTH TWICE A DAY, Disp: 90 tablet, Rfl: 3  •  mupirocin (BACTROBAN) 2 % ointment, APPLY A SMALL AMOUNT TO THE AFFECTED AREA FOR LARGE LESION ON SCALP 3 TIMES DAILY FOR 7 DAYS, Disp: , Rfl:   •  OneTouch Ultra test strip, TEST BLOOD SUGARS ONCE DAILY AS INSTRUCTED BY YOUR PROVIDER, Disp: 100 each, Rfl: 3  •  timolol (TIMOPTIC) 0.5 % ophthalmic solution, , Disp: , Rfl:   •  vitamin C (ASCORBIC ACID) 500 MG tablet, Take 500 mg by mouth Daily., Disp: , Rfl:      Objective       Vital Signs:   /80   Pulse 85   Ht 149.9 cm (59\")   Wt 88 kg (194 lb)   SpO2 97%   BMI 39.18 kg/m²     Physical Exam  Vitals reviewed.   Constitutional:       General: She is not in acute distress.     Appearance: She is well-developed.   Cardiovascular:      Rate and Rhythm: Normal rate and regular rhythm.      Heart sounds: Normal heart sounds. No murmur heard.  Pulmonary:      Effort: Pulmonary effort is normal. No respiratory distress.      Breath sounds: Normal breath sounds. No wheezing or rales.   Skin:     General: Skin is warm and dry.   Neurological:      Mental Status: She is alert and oriented to person, place, and time.        Result Review :{ Labs  Result Review  Imaging  Med Tab  Media :23}   The following data was reviewed by: Diamond CAMACHO" MD Wilfred on 11/01/2022    Common labs    Common Labs 6/15/22 6/15/22 9/12/22 9/12/22    1019 1019 0921 0921   Glucose 85   95   BUN 10   9   Creatinine 0.73   0.78   eGFR Non African Am       Sodium 136   140   Potassium 4.8   4.5   Chloride 100   101   Calcium 9.4   9.7   Albumin 4.30   4.10   Total Bilirubin 0.4   0.5   Alkaline Phosphatase 96   87   AST (SGOT) 16   14   ALT (SGPT) 12   13   WBC   9.97    Hemoglobin   12.0    Hematocrit   36.3    Platelets   393    Total Cholesterol       Triglycerides       HDL Cholesterol       LDL Cholesterol        Hemoglobin A1C  6.60 (A)     (A) Abnormal value             Lab Results   Component Value Date    TSH 1.760 09/12/2022              Assessment and Plan {CC Problem List  Visit Diagnosis  ROS  Review (Popup)  Health Maintenance  Quality  BestPractice  Medications  SmartSets  SnapShot Encounters  Media :23}   Diagnoses and all orders for this visit:    1. Diabetes mellitus without complication (HCC) (Primary)  -     Hemoglobin A1c; Future  -     Microalbumin / Creatinine Urine Ratio - Urine, Clean Catch; Future    2. Essential hypertension  -     Comprehensive Metabolic Panel; Future    3. Acquired hypothyroidism  -     TSH; Future       Diabetes well controlled, continue current medication  Continue home glucose monitoring  Labs prior to next office visit  Hypertension controlled, no change in medication  Recheck TSH with next labs for monitoring hypothyroidism  Continue levothyroxine 88 mcg daily      Follow Up {Instructions Charge Capture  Follow-up Communications :23}   Return in about 3 months (around 2/1/2023) for Medicare Wellness, Recheck.  Patient was given instructions and counseling regarding her condition or for health maintenance advice. Please see specific information pulled into the AVS if appropriate.            This document has been electronically signed by Diamond Hardin MD

## 2023-01-24 ENCOUNTER — LAB (OUTPATIENT)
Dept: LAB | Facility: HOSPITAL | Age: 78
End: 2023-01-24
Payer: MEDICARE

## 2023-01-24 DIAGNOSIS — I10 ESSENTIAL HYPERTENSION: ICD-10-CM

## 2023-01-24 DIAGNOSIS — E03.9 ACQUIRED HYPOTHYROIDISM: ICD-10-CM

## 2023-01-24 DIAGNOSIS — E11.9 DIABETES MELLITUS WITHOUT COMPLICATION: ICD-10-CM

## 2023-01-24 LAB
ALBUMIN SERPL-MCNC: 4.4 G/DL (ref 3.5–5.2)
ALBUMIN UR-MCNC: <1.2 MG/DL
ALBUMIN/GLOB SERPL: 1.5 G/DL
ALP SERPL-CCNC: 104 U/L (ref 39–117)
ALT SERPL W P-5'-P-CCNC: 18 U/L (ref 1–33)
ANION GAP SERPL CALCULATED.3IONS-SCNC: 7 MMOL/L (ref 5–15)
AST SERPL-CCNC: 19 U/L (ref 1–32)
BILIRUB SERPL-MCNC: 0.5 MG/DL (ref 0–1.2)
BUN SERPL-MCNC: 10 MG/DL (ref 8–23)
BUN/CREAT SERPL: 13 (ref 7–25)
CALCIUM SPEC-SCNC: 9.6 MG/DL (ref 8.6–10.5)
CHLORIDE SERPL-SCNC: 98 MMOL/L (ref 98–107)
CO2 SERPL-SCNC: 29 MMOL/L (ref 22–29)
CREAT SERPL-MCNC: 0.77 MG/DL (ref 0.57–1)
CREAT UR-MCNC: 43.8 MG/DL
EGFRCR SERPLBLD CKD-EPI 2021: 80.6 ML/MIN/1.73
GLOBULIN UR ELPH-MCNC: 2.9 GM/DL
GLUCOSE SERPL-MCNC: 112 MG/DL (ref 65–99)
HBA1C MFR BLD: 6.3 % (ref 4.8–5.6)
MICROALBUMIN/CREAT UR: NORMAL MG/G{CREAT}
POTASSIUM SERPL-SCNC: 5 MMOL/L (ref 3.5–5.2)
PROT SERPL-MCNC: 7.3 G/DL (ref 6–8.5)
SODIUM SERPL-SCNC: 134 MMOL/L (ref 136–145)
TSH SERPL DL<=0.05 MIU/L-ACNC: 1.6 UIU/ML (ref 0.27–4.2)

## 2023-01-24 PROCEDURE — 80053 COMPREHEN METABOLIC PANEL: CPT

## 2023-01-24 PROCEDURE — 36415 COLL VENOUS BLD VENIPUNCTURE: CPT

## 2023-01-24 PROCEDURE — 82043 UR ALBUMIN QUANTITATIVE: CPT

## 2023-01-24 PROCEDURE — 84443 ASSAY THYROID STIM HORMONE: CPT

## 2023-01-24 PROCEDURE — 83036 HEMOGLOBIN GLYCOSYLATED A1C: CPT

## 2023-01-24 PROCEDURE — 82570 ASSAY OF URINE CREATININE: CPT

## 2023-01-30 RX ORDER — FLUTICASONE PROPIONATE 50 MCG
SPRAY, SUSPENSION (ML) NASAL
Qty: 48 ML | Refills: 3 | Status: SHIPPED | OUTPATIENT
Start: 2023-01-30 | End: 2023-02-14 | Stop reason: SDUPTHER

## 2023-02-13 RX ORDER — LANCETS 33 GAUGE
EACH MISCELLANEOUS
Qty: 100 EACH | Refills: 3 | Status: SHIPPED | OUTPATIENT
Start: 2023-02-13 | End: 2023-02-24 | Stop reason: SDUPTHER

## 2023-02-14 ENCOUNTER — OFFICE VISIT (OUTPATIENT)
Dept: OTOLARYNGOLOGY | Facility: CLINIC | Age: 78
End: 2023-02-14
Payer: MEDICARE

## 2023-02-14 VITALS — BODY MASS INDEX: 39.51 KG/M2 | TEMPERATURE: 97.9 F | WEIGHT: 196 LBS | HEIGHT: 59 IN

## 2023-02-14 DIAGNOSIS — J34.2 NASAL SEPTAL DEFORMITY: ICD-10-CM

## 2023-02-14 DIAGNOSIS — J31.0 CHRONIC RHINITIS: Primary | ICD-10-CM

## 2023-02-14 PROCEDURE — 99213 OFFICE O/P EST LOW 20 MIN: CPT | Performed by: OTOLARYNGOLOGY

## 2023-02-14 RX ORDER — FLUTICASONE PROPIONATE 50 MCG
2 SPRAY, SUSPENSION (ML) NASAL DAILY
Qty: 48 ML | Refills: 3 | Status: SHIPPED | OUTPATIENT
Start: 2023-02-14

## 2023-02-24 ENCOUNTER — OFFICE VISIT (OUTPATIENT)
Dept: FAMILY MEDICINE CLINIC | Facility: CLINIC | Age: 78
End: 2023-02-24
Payer: MEDICARE

## 2023-02-24 VITALS
HEART RATE: 83 BPM | DIASTOLIC BLOOD PRESSURE: 70 MMHG | WEIGHT: 195 LBS | HEIGHT: 59 IN | BODY MASS INDEX: 39.31 KG/M2 | SYSTOLIC BLOOD PRESSURE: 138 MMHG | OXYGEN SATURATION: 98 %

## 2023-02-24 DIAGNOSIS — E03.9 ACQUIRED HYPOTHYROIDISM: ICD-10-CM

## 2023-02-24 DIAGNOSIS — R29.898 BILATERAL LEG WEAKNESS: ICD-10-CM

## 2023-02-24 DIAGNOSIS — E11.41 TYPE 2 DIABETES MELLITUS WITH DIABETIC MONONEUROPATHY, WITHOUT LONG-TERM CURRENT USE OF INSULIN: ICD-10-CM

## 2023-02-24 DIAGNOSIS — Z00.00 MEDICARE ANNUAL WELLNESS VISIT, SUBSEQUENT: Primary | ICD-10-CM

## 2023-02-24 DIAGNOSIS — I10 ESSENTIAL HYPERTENSION: ICD-10-CM

## 2023-02-24 PROCEDURE — 1126F AMNT PAIN NOTED NONE PRSNT: CPT | Performed by: FAMILY MEDICINE

## 2023-02-24 PROCEDURE — 1170F FXNL STATUS ASSESSED: CPT | Performed by: FAMILY MEDICINE

## 2023-02-24 PROCEDURE — 1160F RVW MEDS BY RX/DR IN RCRD: CPT | Performed by: FAMILY MEDICINE

## 2023-02-24 PROCEDURE — 3044F HG A1C LEVEL LT 7.0%: CPT | Performed by: FAMILY MEDICINE

## 2023-02-24 PROCEDURE — G0439 PPPS, SUBSEQ VISIT: HCPCS | Performed by: FAMILY MEDICINE

## 2023-02-24 PROCEDURE — 3075F SYST BP GE 130 - 139MM HG: CPT | Performed by: FAMILY MEDICINE

## 2023-02-24 PROCEDURE — 3078F DIAST BP <80 MM HG: CPT | Performed by: FAMILY MEDICINE

## 2023-02-24 RX ORDER — LANCETS 33 GAUGE
1 EACH MISCELLANEOUS DAILY
Qty: 100 EACH | Refills: 3 | Status: SHIPPED | OUTPATIENT
Start: 2023-02-24

## 2023-05-05 NOTE — PLAN OF CARE
Problem: Patient Care Overview  Goal: Plan of Care Review  1/13/2020 1244 by Timothy Doe PTA  Outcome: Ongoing (interventions implemented as appropriate)  Flowsheets (Taken 1/13/2020 1244)  Progress: improving  Plan of Care Reviewed With: patient  Outcome Summary: tx limited by elevated BP. pt was educated on HEP and home safety. pt appears to have good understanding. all DC concerns/questions assessed. no new goals met at this time. pt would continue to benefit from PT services.      Child was seen at ED for facial drooping, placed on antibiotic & diagnosed with Bell's Palsy  Mom was notified today that blood work for [employer] was inconclusive (she thinks not enough sample for test)  Mom is very concerned because she feels the drooping is getting worse, she's treating her child with an antibiotic for an undiagnosed condition  Appointment scheduled

## 2023-07-10 ENCOUNTER — TELEPHONE (OUTPATIENT)
Dept: OBSTETRICS AND GYNECOLOGY | Facility: CLINIC | Age: 78
End: 2023-07-10

## 2023-07-10 NOTE — TELEPHONE ENCOUNTER
PATIENT CALLED AND IS NEEDING A CALL BACK. SHE HAD A MISSED CALL FROM [facility]. HER NUMBER [phone].        THANKS,      IVONNE

## 2023-08-01 DIAGNOSIS — E11.42 TYPE 2 DIABETES MELLITUS WITH DIABETIC POLYNEUROPATHY, WITHOUT LONG-TERM CURRENT USE OF INSULIN: ICD-10-CM

## 2023-08-01 RX ORDER — GABAPENTIN 100 MG/1
100 CAPSULE ORAL
Qty: 30 CAPSULE | Refills: 0 | Status: SHIPPED | OUTPATIENT
Start: 2023-08-01

## 2023-08-28 DIAGNOSIS — E03.9 ACQUIRED HYPOTHYROIDISM: ICD-10-CM

## 2023-08-28 DIAGNOSIS — E11.42 TYPE 2 DIABETES MELLITUS WITH DIABETIC POLYNEUROPATHY, WITHOUT LONG-TERM CURRENT USE OF INSULIN: ICD-10-CM

## 2023-08-28 DIAGNOSIS — J30.2 SEASONAL ALLERGIES: Primary | ICD-10-CM

## 2023-08-28 NOTE — TELEPHONE ENCOUNTER
Patient is calling to speak to Dr Hardin's nurse said that Dr Hardin told her to call to speak to the nurse     Says needs to speak to her about several things and just needs her to call [phone] and says she will be there the rest of the day

## 2023-08-29 RX ORDER — GABAPENTIN 100 MG/1
100 CAPSULE ORAL
Qty: 30 CAPSULE | Refills: 0 | Status: SHIPPED | OUTPATIENT
Start: 2023-08-29

## 2023-08-29 RX ORDER — LEVOTHYROXINE SODIUM 88 UG/1
TABLET ORAL
Qty: 90 TABLET | Refills: 3 | Status: SHIPPED | OUTPATIENT
Start: 2023-08-29

## 2023-08-29 NOTE — PROGRESS NOTES
GENERAL SURGERY PROGRESS NOTE  Chief Complaint:  Surgery Follow up   LOS: 2 days       Subjective     Interval History:     Feels well. Tolerated some fulls last PM. No bowel function yet. Has ambulated today.    Objective     Vital Signs  Temp:  [96.2 °F (35.7 °C)-98.9 °F (37.2 °C)] 98.9 °F (37.2 °C)  Heart Rate:  [61-99] 71  Resp:  [16-18] 18  BP: (141-169)/(56-84) 141/65    Physical Exam:   Prevena in place.  Labs:  Lab Results (last 24 hours)     ** No results found for the last 24 hours. **           Results Review:     Labs and imaging for today were reviewed.    Assessment/Plan     Sharon Esposito is a 74 y.o. female who is s/p colon resection.      Continue full liquids today and await bowel function.  On GI and DVT prophylaxis.  Overall looks well.          This document has been electronically signed by Jacobo Zurita MD on January 11, 2020 8:11 AM        Jacobo Zurita MD  01/11/20  8:11 AM         Writer and patient discussed current waitlist, patient reported she would like to attend a morning 55+ group. Writer will contact patient when placement becomes available and provided contact information if patient has questions.    Kristie Aguilera [facility] on 8/29/2023 at 3:08 PM

## 2023-09-26 RX ORDER — METFORMIN HYDROCHLORIDE 500 MG/1
1000 TABLET, EXTENDED RELEASE ORAL
Qty: 180 TABLET | Refills: 3 | Status: SHIPPED | OUTPATIENT
Start: 2023-09-26

## 2024-01-04 NOTE — PROGRESS NOTES
Chief Complaint   Patient presents with   • Colon Polyps       Subjective    Sharon Esposito is a 73 y.o. female. she is here today for follow-up.    History of Present Illness  73-year-old female presents to discuss colonoscopy results.  She denies any abdominal pain, nausea, vomiting or changes within her bowel habits.  She denies any melena or hematochezia.  Colonoscopy was completed 8/14/2019 and noted adequate prep.  A large polyp was noted in the ascending colon which was semi-pedunculated polyp resection was incomplete area was tattooed with injection of Delaney ink.  Many small mouth diverticula were found in sigmoid, descending, transverse and ascending colon.  Hemorrhoids were noted.  Polyp found to be villous adenoma measuring 0.9 x 0.3 x 0.3 cm  Plan; schedule patient for repeat colonoscopy in 3 months due to incomplete polypectomy villous adenoma.  Recommend fiber supplementation daily for diverticulosis.  Follow-up after repeat colonoscopy return office sooner if needed       The following portions of the patient's history were reviewed and updated as appropriate:   Past Medical History:   Diagnosis Date   • Acquired hypothyroidism     with hashimoto's thyroiditis      • Acute bronchitis    • Acute sinusitis    • Allergic rhinitis    • Bilateral hand pain    • Cough    • Diabetes mellitus (CMS/HCC)    • Diverticulitis of colon    • Edema     idiopathic state, history of      • Encounter for gynecological examination (general) (routine) without abnormal findings    • Encounter for screening for osteoporosis    • Essential hypertension    • Hemorrhoids     internal & external      • Hx of bone density study     DEXA BONE DENSITY APPENDICULAR: osteopenia, 12/18/2013   • Hx of screening mammography     MAMMOGRAM SCREENING: x2, 02/26/2014   • Hyperlipidemia     with elevated low density lipoprotein   • 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 If Yes, how long?) : No    Call Back # : [phone]    Which State are you currently located in?: [place]    Name of Clinic Site / Acct# : [address] [place] [id number]    Use following scripting for patients waiting for a callback:   \"Nurse callback times vary based on call volumes; please be aware the return phone call may come from an unidentified or out of state phone number. If your symptoms worsen or become life threatening while waiting, you should seek immediate assistance by calling 911 or going to the ER for evaluation.\"     skin and subcutaneous tissue     Infection of skin AND/OR subcutaneous tissue      • Onychogryposis     recurrent ingrown toenail      • Open wound of face    • Osteopenia    • Screening for malignant neoplasm of breast    • Screening for osteoporosis    • Seasonal allergies     Seasonal allergy      • Vitamin deficiency      Past Surgical History:   Procedure Laterality Date   • CATARACT EXTRACTION, BILATERAL     • COLONOSCOPY      Approximately 2009   • COLONOSCOPY N/A 2019    Procedure: COLONOSCOPY Monday or Wed;  Surgeon: Tony Curiel MD;  Location: [facility] ENDOSCOPY;  Service: Gastroenterology   • ENDOSCOPY AND COLONOSCOPY  2009    A few small diverticula in the sigmoid & the descending colon. Small internal & external hemorrhoids were present. Colonoscopy otherwise normal   • INJECTION OF MEDICATION  2015    Celestone (betamethasone) x2   • INJECTION OF MEDICATION  2014    Toradol    • PAP SMEAR  2009    Negative for intraepithelial lesion or malignancy   • SKIN BIOPSY  2010    L neck lesion- seborrheic keratosis. right infraorbital-seborrheic keratosis, right lateral orbital- intradermal nevus. forehead lesion-early squamous papilloma     Family History   Problem Relation Age of Onset   • Breast cancer Mother    • Cancer Mother 60        Breast   • Diabetes Mother    • Heart disease Mother    • Hypertension Mother    • Coronary artery disease Father    • Breast cancer Other    • Coronary artery disease Other    • Heart disease Other      OB History      Para Term  AB Living    0 0 0 0 0 0    SAB TAB Ectopic Molar Multiple Live Births    0 0 0   0          Prior to Admission medications    Medication Sig Start Date End Date Taking? Authorizing Provider   atorvastatin (LIPITOR) 40 MG tablet Take 1 tablet by mouth Daily. 3/15/19  Yes Jeffry Irby MD   Blood Glucose Monitoring Suppl (ONE TOUCH ULTRA SYSTEM KIT) W/DEVICE kit Use for Home Glucose  Monitoring 4/21/17  Yes Jeffry Irby MD   Cyanocobalamin (VITAMIN B-12) 1000 MCG sublingual tablet Place 1 tablet under your tongue daily for the rest of your life. 2/20/19  Yes Jeffry Irby MD   fluticasone (FLONASE) 50 MCG/ACT nasal spray 2 sprays into the nostril(s) as directed by provider Daily. 1/8/19  Yes Sonido Moyer MD   glucose blood (ONE TOUCH ULTRA TEST) test strip Test Blood Sugars Once daily 5/22/17  Yes Jeffry Irby MD   levothyroxine (SYNTHROID, LEVOTHROID) 88 MCG tablet Take 1 tablet by mouth Daily. 3/15/19  Yes Jeffry Irby MD   Loratadine 10 MG capsule Take  by mouth.   Yes Lesly Magana MD   losartan-hydrochlorothiazide (HYZAAR) 100-25 MG per tablet Take 1 tablet by mouth Daily. 3/15/19  Yes Jeffry Irby MD   metFORMIN ER (GLUCOPHAGE-XR) 500 MG 24 hr tablet Take 2 tablets by mouth Daily Before Supper. 2/20/19  Yes Jeffry Irby MD   metoprolol tartrate (LOPRESSOR) 25 MG tablet TAKE 1/2 TABLET BY MOUTH EVERY 12 (TWELVE) HOURS. 3/11/19  Yes Jeffry Irby MD   ONE TOUCH ULTRA TEST test strip TEST BLOOD SUGARS ONCE DAILY 7/19/19  Yes Jeffry Irby MD ONETOUCH DELICA LANCETS 33G misc USE TO TEST BLOOD SUGAR ONCE DAILY 7/19/19  Yes Jeffry Irby MD ONETOUCH DELICA LANCETS FINE misc Test Blood Sugars Once Daily 5/22/17  Yes Jeffry Irby MD   vitamin C (ASCORBIC ACID) 500 MG tablet Take 500 mg by mouth Daily.   Yes ProviderLesly MD     Allergies   Allergen Reactions   • Codeine GI Intolerance   • Latex Rash   • Neosporin [Neomycin-Polymyxin-Gramicidin] Rash     Social History     Socioeconomic History   • Marital status: Single     Spouse name: Not on file   • Number of children: Not on file   • Years of education: Not on file   • Highest education level: Not on file   Tobacco Use   • Smoking status: Never Smoker   • Smokeless tobacco: Never Used   Substance and Sexual Activity   • Alcohol use: No   • Drug use: No   • Sexual  "activity: Defer       Review of Systems  Review of Systems   Constitutional: Negative for activity change, appetite change, chills, diaphoresis, fatigue, fever and unexpected weight change.   HENT: Negative for sore throat and trouble swallowing.    Respiratory: Negative for shortness of breath.    Gastrointestinal: Negative for abdominal distention, abdominal pain, anal bleeding, blood in stool, constipation, diarrhea, nausea, rectal pain and vomiting.   Musculoskeletal: Negative for arthralgias.   Skin: Negative for pallor.   Neurological: Negative for light-headedness.        /72 (BP Location: Right arm)   Pulse 68   Ht 162.6 cm (64\")   Wt 88.8 kg (195 lb 12.8 oz)   LMP  (LMP Unknown)   BMI 33.61 kg/m²     Objective    Physical Exam   Constitutional: She is oriented to person, place, and time. She appears well-developed and well-nourished. She is cooperative. No distress.   HENT:   Head: Normocephalic and atraumatic.   Neck: Normal range of motion. Neck supple. No thyromegaly present.   Cardiovascular: Normal rate, regular rhythm and normal heart sounds.   Pulmonary/Chest: Effort normal and breath sounds normal. She has no wheezes. She has no rhonchi. She has no rales.   Abdominal: Soft. Normal appearance and bowel sounds are normal. She exhibits no shifting dullness, no distension, no fluid wave and no ascites. There is no hepatosplenomegaly. There is no tenderness. There is no rigidity and no guarding. No hernia.   Lymphadenopathy:     She has no cervical adenopathy.   Neurological: She is alert and oriented to person, place, and time.   Skin: Skin is warm, dry and intact. No rash noted. No pallor.   Psychiatric: She has a normal mood and affect. Her speech is normal.     Admission on 08/14/2019, Discharged on 08/14/2019   Component Date Value Ref Range Status   • Case Report 08/14/2019    Final                    Value:Surgical Pathology Report                         Case: [id number]                "                   Authorizing Provider:  Tony Curiel MD        Collected:           08/14/2019 10:07 AM          Ordering Location:     [facility]             Received:            08/14/2019 12:56 PM                                 [facility] [place] [facility]                                                     Pathologist:           Danilo Sanchez MD                                                         Specimen:    Large Intestine, Right / Ascending Colon, POLYP                                           • Final Diagnosis 08/14/2019    Final                    Value:This result contains rich text formatting which cannot be displayed here.   • Gross Description 08/14/2019    Final                    Value:This result contains rich text formatting which cannot be displayed here.     Assessment/Plan      1. Villous adenoma of colon    .       Orders placed during this encounter include:  Orders Placed This Encounter   Procedures   • Follow Anesthesia Guidelines / Standing Orders     Standing Status:   Future   • Obtain Informed Consent     Standing Status:   Future     Order Specific Question:   Informed Consent Given For     Answer:   COLONOSCOPY       COLONOSCOPY A Wed in Nov (N/A)    Review and/or summary of lab tests, radiology, procedures, medications. Review and summary of old records and obtaining of history. The risks and benefits of my recommendations, as well as other treatment options were discussed with the patient today. Questions were answered.    New Medications Ordered This Visit   Medications   • polyethylene glycol (GoLYTELY) 236 g solution     Sig: Starting at noon on day prior to procedure, drink 8 ounces every 30 minutes until all gone or stools are clear. May add flavor packet.     Dispense:  4000 mL     Refill:  0       Follow-up: Return in about 3 months (around 11/21/2019).          This document has been electronically signed by LEXI Chawla on August 21, 2019 3:59 PM        "      Results for orders placed or performed during the hospital encounter of 08/14/19   Tissue Pathology Exam   Result Value Ref Range    Case Report       Surgical Pathology Report                         Case: [id number]                                  Authorizing Provider:  Tony Curiel MD        Collected:           08/14/2019 10:07 AM          Ordering Location:     [facility]             Received:            08/14/2019 12:56 PM                                 [place] [facility]                                                     Pathologist:           Danilo Sanchez MD                                                         Specimen:    Large Intestine, Right / Ascending Colon, POLYP                                            Final Diagnosis       POLYP, ASCENDING COLON:  VILLOUS ADENOMA.      Gross Description       The container is labeled \"polyp, ascending colon\" and has an elongate fragment of white tissue measuring 0.9 x 0.3 x 0.3 cm.  It is entirely embedded as 1A.     Results for orders placed or performed in visit on 05/29/19   Microalbumin / Creatinine Urine Ratio - Urine, Clean Catch   Result Value Ref Range    Microalbumin/Creatinine Ratio  mg/g    Creatinine, Urine 36.3 mg/dL    Microalbumin, Urine <1.2 mg/L   TSH   Result Value Ref Range    TSH 1.450 0.270 - 4.200 mIU/mL   T4, Free   Result Value Ref Range    Free T4 1.42 0.93 - 1.70 ng/dL   Hemoglobin A1c   Result Value Ref Range    Hemoglobin A1C 6.21 (H) 4.80 - 5.60 %   Vitamin B12   Result Value Ref Range    Vitamin B-12 1,000 (H) 211 - 946 pg/mL   Lipid Panel   Result Value Ref Range    Total Cholesterol 200 0 - 200 mg/dL    Triglycerides 165 (H) 0 - 150 mg/dL    HDL Cholesterol 74 (H) 40 - 60 mg/dL    LDL Cholesterol  93 0 - 100 mg/dL    VLDL Cholesterol 33 5 - 40 mg/dL    LDL/HDL Ratio 1.26    Comprehensive Metabolic Panel   Result Value Ref Range    Glucose 109 (H) 65 - 99 mg/dL    BUN 12 8 - 23 mg/dL    Creatinine 0.78 " 0.57 - 1.00 mg/dL    Sodium 140 136 - 145 mmol/L    Potassium 4.1 3.5 - 5.2 mmol/L    Chloride 101 98 - 107 mmol/L    CO2 23.9 22.0 - 29.0 mmol/L    Calcium 9.1 8.6 - 10.5 mg/dL    Total Protein 7.2 6.0 - 8.5 g/dL    Albumin 4.40 3.50 - 5.20 g/dL    ALT (SGPT) 16 1 - 33 U/L    AST (SGOT) 17 1 - 32 U/L    Alkaline Phosphatase 97 39 - 117 U/L    Total Bilirubin 0.4 0.2 - 1.2 mg/dL    eGFR Non African Amer 73 >60 mL/min/1.73    Globulin 2.8 gm/dL    A/G Ratio 1.6 g/dL    BUN/Creatinine Ratio 15.4 7.0 - 25.0    Anion Gap 15.1 mmol/L   Results for orders placed or performed in visit on 11/28/18   CBC Auto Differential   Result Value Ref Range    WBC 9.61 3.20 - 9.80 10*3/mm3    RBC 4.90 3.77 - 5.16 10*6/mm3    Hemoglobin 13.7 12.0 - 15.5 g/dL    Hematocrit 42.2 35.0 - 45.0 %    MCV 86.1 80.0 - 98.0 fL    MCH 28.0 26.5 - 34.0 pg    MCHC 32.5 31.4 - 36.0 g/dL    RDW 15.1 (H) 11.5 - 14.5 %    RDW-SD 47.8 (H) 36.4 - 46.3 fl    MPV 9.4 8.0 - 12.0 fL    Platelets 346 150 - 450 10*3/mm3    Neutrophil % 58.9 37.0 - 80.0 %    Lymphocyte % 29.7 10.0 - 50.0 %    Monocyte % 7.0 0.0 - 12.0 %    Eosinophil % 3.9 0.0 - 7.0 %    Basophil % 0.3 0.0 - 2.0 %    Immature Grans % 0.2 0.0 - 0.5 %    Neutrophils, Absolute 5.67 2.00 - 8.60 10*3/mm3    Lymphocytes, Absolute 2.85 0.60 - 4.20 10*3/mm3    Monocytes, Absolute 0.67 0.00 - 0.90 10*3/mm3    Eosinophils, Absolute 0.37 0.00 - 0.70 10*3/mm3    Basophils, Absolute 0.03 0.00 - 0.20 10*3/mm3    Immature Grans, Absolute 0.02 0.00 - 0.02 10*3/mm3   Microalbumin / Creatinine Urine Ratio - Urine, Clean Catch   Result Value Ref Range    Microalbumin/Creatinine Ratio  0.0 - 30.0 mg/g    Creatinine, Urine 41.4 mg/dL    Microalbumin, Urine <0.6 mg/L   TSH   Result Value Ref Range    TSH 2.090 0.460 - 4.680 mIU/mL   T4, Free   Result Value Ref Range    Free T4 1.32 0.78 - 2.19 ng/dL   Hemoglobin A1c   Result Value Ref Range    Hemoglobin A1C 6.4 (H) 4 - 5.6 %   Vitamin B12   Result Value Ref Range     Vitamin B-12 222 (L) 239 - 931 pg/mL   Lipid Panel   Result Value Ref Range    Total Cholesterol 181 0 - 199 mg/dL    Triglycerides 200 (H) 20 - 199 mg/dL    HDL Cholesterol 59 (L) 60 - 200 mg/dL    LDL Cholesterol  88 1 - 129 mg/dL    LDL/HDL Ratio 1.39 0.00 - 3.22   Comprehensive Metabolic Panel   Result Value Ref Range    Glucose 114 (H) 60 - 100 mg/dL    BUN 14 7 - 21 mg/dL    Creatinine 0.82 0.50 - 1.00 mg/dL    Sodium 139 137 - 145 mmol/L    Potassium 3.8 3.5 - 5.1 mmol/L    Chloride 96 95 - 110 mmol/L    CO2 30.0 22.0 - 31.0 mmol/L    Calcium 9.7 8.4 - 10.2 mg/dL    Total Protein 8.0 6.3 - 8.6 g/dL    Albumin 4.70 3.40 - 4.80 g/dL    ALT (SGPT) 21 9 - 52 U/L    AST (SGOT) 33 14 - 36 U/L    Alkaline Phosphatase 116 38 - 126 U/L    Total Bilirubin 0.9 0.2 - 1.3 mg/dL    eGFR Non African Amer 69 39 - 90 mL/min/1.73    Globulin 3.3 2.3 - 3.5 gm/dL    A/G Ratio 1.4 1.1 - 1.8 g/dL    BUN/Creatinine Ratio 17.1 7.0 - 25.0    Anion Gap 13.0 5.0 - 15.0 mmol/L   Results for orders placed or performed in visit on 05/23/18   CBC Auto Differential   Result Value Ref Range    WBC 10.01 (H) 3.20 - 9.80 10*3/mm3    RBC 4.91 3.77 - 5.16 10*6/mm3    Hemoglobin 13.8 12.0 - 15.5 g/dL    Hematocrit 42.2 35.0 - 45.0 %    MCV 85.9 80.0 - 98.0 fL    MCH 28.1 26.5 - 34.0 pg    MCHC 32.7 31.4 - 36.0 g/dL    RDW 15.2 (H) 11.5 - 14.5 %    RDW-SD 48.1 (H) 36.4 - 46.3 fl    MPV 9.6 8.0 - 12.0 fL    Platelets 345 150 - 450 10*3/mm3    Neutrophil % 48.8 37.0 - 80.0 %    Lymphocyte % 38.5 10.0 - 50.0 %    Monocyte % 6.9 0.0 - 12.0 %    Eosinophil % 5.0 0.0 - 7.0 %    Basophil % 0.5 0.0 - 2.0 %    Immature Grans % 0.3 0.0 - 0.5 %    Neutrophils, Absolute 4.89 2.00 - 8.60 10*3/mm3    Lymphocytes, Absolute 3.85 0.60 - 4.20 10*3/mm3    Monocytes, Absolute 0.69 0.00 - 0.90 10*3/mm3    Eosinophils, Absolute 0.50 0.00 - 0.70 10*3/mm3    Basophils, Absolute 0.05 0.00 - 0.20 10*3/mm3    Immature Grans, Absolute 0.03 (H) 0.00 - 0.02 10*3/mm3     *Note:  Due to a large number of results and/or encounters for the requested time period, some results have not been displayed. A complete set of results can be found in Results Review.

## (undated) DEVICE — SUT VIC 2/0 SH 27IN

## (undated) DEVICE — Device: Brand: DISPOSABLE ELECTROSURGICAL SNARE

## (undated) DEVICE — ADHS LIQ MASTISOL 2/3ML

## (undated) DEVICE — GLV SURG SENSICARE PI PF LF 7 GRN STRL

## (undated) DEVICE — GLV SURG SENSICARE PI LF PF 7.5 GRN STRL

## (undated) DEVICE — TP SXN YANKR BLB TIP W/TBG 10F LF STRL

## (undated) DEVICE — SINGLE USE INJECTOR: Brand: SINGLE USE INJECTOR

## (undated) DEVICE — TRAP SXN POLYP QUICKCATCH LF

## (undated) DEVICE — STERILE POLYISOPRENE POWDER-FREE SURGICAL GLOVES WITH EMOLLIENT COATING: Brand: PROTEXIS

## (undated) DEVICE — SOL IRR NACL 0.9PCT BT 1000ML

## (undated) DEVICE — LIGHT HANDLE: Brand: DEVON

## (undated) DEVICE — TOWEL,OR,DSP,ST,BLUE,DLX,8/PK,10PK/CS: Brand: MEDLINE

## (undated) DEVICE — SKIN AFFIX SURG ADHESIVE 72/CS 0.55ML: Brand: MEDLINE

## (undated) DEVICE — CANN SMPL SOFTECH BIFLO ETCO2 A/M 7FT

## (undated) DEVICE — DRP WARMR MACH

## (undated) DEVICE — SPNG LAP 18X18IN LF STRL PK/5

## (undated) DEVICE — ENDOPATH XCEL DILATING TIP TROCARS WITH STABILITY SLEEVES: Brand: ENDOPATH XCEL

## (undated) DEVICE — ENSEAL LAPAROSCOPIC TISSUE SEALER G2 ARTICULATING  CURVED JAW FOR USE WITH G2 GENERATOR 5MM DIAMETER 35CM SHAFT LENGTH: Brand: ENSEAL

## (undated) DEVICE — CORE TRUMPET FOR SINGLE SOLUTION BAG: Brand: CORE DYNAMICS

## (undated) DEVICE — SUT PDS 1 XLH LP 99IN Z881G

## (undated) DEVICE — PK LAP CHOLE LF 60

## (undated) DEVICE — PREVENA PEEL & PLACE SYSTEM KIT- 13 CM: Brand: PREVENA™ PEEL & PLACE™

## (undated) DEVICE — SINGLE-USE BIOPSY FORCEPS: Brand: RADIAL JAW 4

## (undated) DEVICE — SUT VIC 2/0 TIES 18IN J111T

## (undated) DEVICE — SUT VICRYL 3-0 SH-1 PO 18IN J772D

## (undated) DEVICE — PAD GRND REM POLYHESIVE A/ DISP

## (undated) DEVICE — MONOPOLAR METZENBAUM SCISSOR TIP, DISPOSABLE: Brand: MONOPOLAR METZENBAUM SCISSOR TIP, DISPOSABLE

## (undated) DEVICE — BLUNT TIP TROCAR: Brand: AUTO SUTURE

## (undated) DEVICE — STPLR SKIN VISISTAT WD 35CT

## (undated) DEVICE — WOUND RETRACTOR AND PROTECTOR: Brand: ALEXIS O WOUND PROTECTOR-RETRACTOR

## (undated) DEVICE — SUT MONOCRYL 4/0 PS2 27IN Y426H ETY426H

## (undated) DEVICE — TP SXN YANKR BULB STRL

## (undated) DEVICE — ELECTRD BLD STD SS 3/32X6.5IN

## (undated) DEVICE — TOTAL TRAY, 16FR 10ML SIL FOLEY, URN: Brand: MEDLINE

## (undated) DEVICE — TRY IRR

## (undated) DEVICE — VISUALIZATION SYSTEM: Brand: CLEARIFY

## (undated) DEVICE — MARKR SPOT ENDOSCOPIC LESION INJ

## (undated) DEVICE — GOWN,AURORA,NOREINF,RAGLAN,XL,STERILE: Brand: MEDLINE